# Patient Record
Sex: FEMALE | Race: WHITE | NOT HISPANIC OR LATINO | Employment: FULL TIME | URBAN - METROPOLITAN AREA
[De-identification: names, ages, dates, MRNs, and addresses within clinical notes are randomized per-mention and may not be internally consistent; named-entity substitution may affect disease eponyms.]

---

## 2017-01-10 ENCOUNTER — ALLSCRIPTS OFFICE VISIT (OUTPATIENT)
Dept: OTHER | Facility: OTHER | Age: 37
End: 2017-01-10

## 2017-02-15 ENCOUNTER — ALLSCRIPTS OFFICE VISIT (OUTPATIENT)
Dept: OTHER | Facility: OTHER | Age: 37
End: 2017-02-15

## 2017-02-20 ENCOUNTER — ALLSCRIPTS OFFICE VISIT (OUTPATIENT)
Dept: OTHER | Facility: OTHER | Age: 37
End: 2017-02-20

## 2017-02-20 ENCOUNTER — TRANSCRIBE ORDERS (OUTPATIENT)
Dept: ADMINISTRATIVE | Facility: HOSPITAL | Age: 37
End: 2017-02-20

## 2017-02-20 DIAGNOSIS — I07.1 RHEUMATIC TRICUSPID INSUFFICIENCY: ICD-10-CM

## 2017-02-20 DIAGNOSIS — R74.02 NONSPECIFIC ELEVATION OF LEVELS OF TRANSAMINASE AND LACTIC ACID DEHYDROGENASE (LDH): ICD-10-CM

## 2017-02-20 DIAGNOSIS — R01.1 UNDIAGNOSED CARDIAC MURMURS: ICD-10-CM

## 2017-02-20 DIAGNOSIS — I34.0 NONRHEUMATIC MITRAL VALVE INSUFFICIENCY: ICD-10-CM

## 2017-02-20 DIAGNOSIS — R01.1 CARDIAC MURMUR: ICD-10-CM

## 2017-02-20 DIAGNOSIS — E66.01 MORBID (SEVERE) OBESITY DUE TO EXCESS CALORIES (HCC): ICD-10-CM

## 2017-02-20 DIAGNOSIS — R74.01 NONSPECIFIC ELEVATION OF LEVELS OF TRANSAMINASE AND LACTIC ACID DEHYDROGENASE (LDH): ICD-10-CM

## 2017-02-20 DIAGNOSIS — I34.1 J.B. BARLOW'S SYNDROME: ICD-10-CM

## 2017-02-20 DIAGNOSIS — I07.1 RHEUMATIC TRICUSPID VALVE REGURGITATION: Primary | ICD-10-CM

## 2017-02-20 DIAGNOSIS — F32.9 MAJOR DEPRESSIVE DISORDER, SINGLE EPISODE: ICD-10-CM

## 2017-02-20 DIAGNOSIS — I10 ESSENTIAL (PRIMARY) HYPERTENSION: ICD-10-CM

## 2017-02-21 ENCOUNTER — LAB CONVERSION - ENCOUNTER (OUTPATIENT)
Dept: OTHER | Facility: OTHER | Age: 37
End: 2017-02-21

## 2017-02-21 ENCOUNTER — GENERIC CONVERSION - ENCOUNTER (OUTPATIENT)
Dept: OTHER | Facility: OTHER | Age: 37
End: 2017-02-21

## 2017-02-21 LAB
A/G RATIO (HISTORICAL): 1.6 (CALC) (ref 1–2.5)
ALBUMIN SERPL BCP-MCNC: 4.4 G/DL (ref 3.6–5.1)
ALP SERPL-CCNC: 71 U/L (ref 33–115)
ALT SERPL W P-5'-P-CCNC: 27 U/L (ref 6–29)
AST SERPL W P-5'-P-CCNC: 17 U/L (ref 10–30)
BILIRUB SERPL-MCNC: 0.6 MG/DL (ref 0.2–1.2)
BILIRUBIN DIRECT (HISTORICAL): 0.1 MG/DL
GAMMA GLOBULIN (HISTORICAL): 2.7 G/DL (CALC) (ref 1.9–3.7)
HEPATITIS A IGM ANTIBODY (HISTORICAL): NORMAL
HEPATITIS B CORE TOTAL ANTIBODY (HISTORICAL): NORMAL
HEPATITIS B SURFACE ANTIGEN (HISTORICAL): NORMAL
HEPATITIS C ANTIBODY (HISTORICAL): NORMAL
INDIRECT BILIRUBIN (HISTORICAL): 0.5 MG/DL (CALC) (ref 0.2–1.2)
SIGNAL TO CUT-OFF (HISTORICAL): 0.02
TOTAL PROTEIN (HISTORICAL): 7.1 G/DL (ref 6.1–8.1)

## 2017-02-28 ENCOUNTER — ANESTHESIA EVENT (OUTPATIENT)
Dept: GASTROENTEROLOGY | Facility: HOSPITAL | Age: 37
End: 2017-02-28
Payer: COMMERCIAL

## 2017-03-01 ENCOUNTER — HOSPITAL ENCOUNTER (OUTPATIENT)
Facility: HOSPITAL | Age: 37
Setting detail: OUTPATIENT SURGERY
Discharge: HOME | End: 2017-03-01
Attending: SURGERY | Admitting: SURGERY
Payer: COMMERCIAL

## 2017-03-01 ENCOUNTER — ANESTHESIA (OUTPATIENT)
Dept: GASTROENTEROLOGY | Facility: HOSPITAL | Age: 37
End: 2017-03-01
Payer: COMMERCIAL

## 2017-03-01 VITALS
BODY MASS INDEX: 43.34 KG/M2 | RESPIRATION RATE: 18 BRPM | HEART RATE: 63 BPM | DIASTOLIC BLOOD PRESSURE: 75 MMHG | OXYGEN SATURATION: 98 % | HEIGHT: 59 IN | WEIGHT: 215 LBS | SYSTOLIC BLOOD PRESSURE: 129 MMHG | TEMPERATURE: 97.9 F

## 2017-03-01 DIAGNOSIS — E66.01 MORBID (SEVERE) OBESITY DUE TO EXCESS CALORIES (HCC): ICD-10-CM

## 2017-03-01 LAB — EXT PREGNANCY TEST URINE: NEGATIVE

## 2017-03-01 PROCEDURE — 88305 TISSUE EXAM BY PATHOLOGIST: CPT | Performed by: SURGERY

## 2017-03-01 PROCEDURE — 81025 URINE PREGNANCY TEST: CPT | Performed by: ANESTHESIOLOGY

## 2017-03-01 PROCEDURE — 88342 IMHCHEM/IMCYTCHM 1ST ANTB: CPT | Performed by: SURGERY

## 2017-03-01 RX ORDER — ATENOLOL 50 MG/1
50 TABLET ORAL EVERY MORNING
Status: ON HOLD | COMMUNITY
End: 2017-06-01

## 2017-03-01 RX ORDER — SODIUM CHLORIDE 9 MG/ML
125 INJECTION, SOLUTION INTRAVENOUS CONTINUOUS
Status: DISCONTINUED | OUTPATIENT
Start: 2017-03-01 | End: 2017-03-01 | Stop reason: HOSPADM

## 2017-03-01 RX ORDER — FLUOXETINE 20 MG/1
20 TABLET, FILM COATED ORAL EVERY MORNING
COMMUNITY
End: 2018-02-19

## 2017-03-01 RX ORDER — PROPOFOL 10 MG/ML
INJECTION, EMULSION INTRAVENOUS AS NEEDED
Status: DISCONTINUED | OUTPATIENT
Start: 2017-03-01 | End: 2017-03-01 | Stop reason: SURG

## 2017-03-01 RX ORDER — ONDANSETRON 2 MG/ML
4 INJECTION INTRAMUSCULAR; INTRAVENOUS ONCE
Status: DISCONTINUED | OUTPATIENT
Start: 2017-03-01 | End: 2017-03-01 | Stop reason: HOSPADM

## 2017-03-01 RX ADMIN — PROPOFOL 160 MG: 10 INJECTION, EMULSION INTRAVENOUS at 08:48

## 2017-03-01 RX ADMIN — SODIUM CHLORIDE 125 ML/HR: 0.9 INJECTION, SOLUTION INTRAVENOUS at 07:10

## 2017-03-01 RX ADMIN — PROPOFOL 40 MG: 10 INJECTION, EMULSION INTRAVENOUS at 08:52

## 2017-03-22 ENCOUNTER — APPOINTMENT (OUTPATIENT)
Dept: LAB | Facility: HOSPITAL | Age: 37
End: 2017-03-22
Attending: SURGERY
Payer: COMMERCIAL

## 2017-03-22 ENCOUNTER — HOSPITAL ENCOUNTER (OUTPATIENT)
Dept: NON INVASIVE DIAGNOSTICS | Facility: HOSPITAL | Age: 37
Discharge: HOME | End: 2017-03-22
Payer: COMMERCIAL

## 2017-03-22 ENCOUNTER — GENERIC CONVERSION - ENCOUNTER (OUTPATIENT)
Dept: OTHER | Facility: OTHER | Age: 37
End: 2017-03-22

## 2017-03-22 ENCOUNTER — HOSPITAL ENCOUNTER (OUTPATIENT)
Dept: ULTRASOUND IMAGING | Facility: HOSPITAL | Age: 37
Discharge: HOME | End: 2017-03-22
Payer: COMMERCIAL

## 2017-03-22 DIAGNOSIS — R74.01 NONSPECIFIC ELEVATION OF LEVELS OF TRANSAMINASE AND LACTIC ACID DEHYDROGENASE (LDH): ICD-10-CM

## 2017-03-22 DIAGNOSIS — I07.1 RHEUMATIC TRICUSPID VALVE REGURGITATION: ICD-10-CM

## 2017-03-22 DIAGNOSIS — R74.02 NONSPECIFIC ELEVATION OF LEVELS OF TRANSAMINASE AND LACTIC ACID DEHYDROGENASE (LDH): ICD-10-CM

## 2017-03-22 DIAGNOSIS — F32.9 MAJOR DEPRESSIVE DISORDER, SINGLE EPISODE: ICD-10-CM

## 2017-03-22 DIAGNOSIS — I34.1 J.B. BARLOW'S SYNDROME: ICD-10-CM

## 2017-03-22 DIAGNOSIS — R01.1 UNDIAGNOSED CARDIAC MURMURS: ICD-10-CM

## 2017-03-22 DIAGNOSIS — E66.01 MORBID (SEVERE) OBESITY DUE TO EXCESS CALORIES (HCC): ICD-10-CM

## 2017-03-22 LAB
ALBUMIN SERPL BCP-MCNC: 3.9 G/DL (ref 3.5–5)
ALP SERPL-CCNC: 81 U/L (ref 46–116)
ALT SERPL W P-5'-P-CCNC: 37 U/L (ref 12–78)
ANION GAP SERPL CALCULATED.3IONS-SCNC: 8 MMOL/L (ref 4–13)
AST SERPL W P-5'-P-CCNC: 19 U/L (ref 5–45)
BILIRUB SERPL-MCNC: 0.45 MG/DL (ref 0.2–1)
BUN SERPL-MCNC: 16 MG/DL (ref 5–25)
CALCIUM SERPL-MCNC: 8.6 MG/DL (ref 8.3–10.1)
CHLORIDE SERPL-SCNC: 102 MMOL/L (ref 100–108)
CHOLEST SERPL-MCNC: 152 MG/DL (ref 50–200)
CO2 SERPL-SCNC: 28 MMOL/L (ref 21–32)
CREAT SERPL-MCNC: 0.75 MG/DL (ref 0.6–1.3)
ERYTHROCYTE [DISTWIDTH] IN BLOOD BY AUTOMATED COUNT: 12.2 % (ref 11.6–15.1)
GFR SERPL CREATININE-BSD FRML MDRD: >60 ML/MIN/1.73SQ M
GLUCOSE P FAST SERPL-MCNC: 92 MG/DL (ref 65–99)
HCT VFR BLD AUTO: 39.7 % (ref 34.8–46.1)
HDLC SERPL-MCNC: 50 MG/DL (ref 40–60)
HGB BLD-MCNC: 13.7 G/DL (ref 11.5–15.4)
LDLC SERPL CALC-MCNC: 88 MG/DL (ref 0–100)
MCH RBC QN AUTO: 30.9 PG (ref 26.8–34.3)
MCHC RBC AUTO-ENTMCNC: 34.5 G/DL (ref 31.4–37.4)
MCV RBC AUTO: 90 FL (ref 82–98)
PLATELET # BLD AUTO: 315 THOUSANDS/UL (ref 149–390)
PMV BLD AUTO: 11.1 FL (ref 8.9–12.7)
POTASSIUM SERPL-SCNC: 4.1 MMOL/L (ref 3.5–5.3)
PROT SERPL-MCNC: 7.2 G/DL (ref 6.4–8.2)
RBC # BLD AUTO: 4.43 MILLION/UL (ref 3.81–5.12)
SODIUM SERPL-SCNC: 138 MMOL/L (ref 136–145)
TRIGL SERPL-MCNC: 72 MG/DL
TSH SERPL DL<=0.05 MIU/L-ACNC: 2.87 UIU/ML (ref 0.36–3.74)
WBC # BLD AUTO: 7.56 THOUSAND/UL (ref 4.31–10.16)

## 2017-03-22 PROCEDURE — 76700 US EXAM ABDOM COMPLETE: CPT

## 2017-03-22 PROCEDURE — 84443 ASSAY THYROID STIM HORMONE: CPT

## 2017-03-22 PROCEDURE — 85027 COMPLETE CBC AUTOMATED: CPT

## 2017-03-22 PROCEDURE — 93306 TTE W/DOPPLER COMPLETE: CPT

## 2017-03-22 PROCEDURE — 36415 COLL VENOUS BLD VENIPUNCTURE: CPT

## 2017-03-22 PROCEDURE — 80053 COMPREHEN METABOLIC PANEL: CPT

## 2017-03-22 PROCEDURE — 80061 LIPID PANEL: CPT

## 2017-03-24 ENCOUNTER — GENERIC CONVERSION - ENCOUNTER (OUTPATIENT)
Dept: OTHER | Facility: OTHER | Age: 37
End: 2017-03-24

## 2017-03-28 ENCOUNTER — GENERIC CONVERSION - ENCOUNTER (OUTPATIENT)
Dept: OTHER | Facility: OTHER | Age: 37
End: 2017-03-28

## 2017-03-31 ENCOUNTER — ALLSCRIPTS OFFICE VISIT (OUTPATIENT)
Dept: OTHER | Facility: OTHER | Age: 37
End: 2017-03-31

## 2017-05-11 ENCOUNTER — ANESTHESIA EVENT (OUTPATIENT)
Dept: PERIOP | Facility: HOSPITAL | Age: 37
DRG: 620 | End: 2017-05-11
Payer: COMMERCIAL

## 2017-05-11 ENCOUNTER — ALLSCRIPTS OFFICE VISIT (OUTPATIENT)
Dept: OTHER | Facility: OTHER | Age: 37
End: 2017-05-11

## 2017-05-22 ENCOUNTER — GENERIC CONVERSION - ENCOUNTER (OUTPATIENT)
Dept: OTHER | Facility: OTHER | Age: 37
End: 2017-05-22

## 2017-05-30 ENCOUNTER — ANESTHESIA (OUTPATIENT)
Dept: PERIOP | Facility: HOSPITAL | Age: 37
DRG: 620 | End: 2017-05-30
Payer: COMMERCIAL

## 2017-05-30 ENCOUNTER — HOSPITAL ENCOUNTER (INPATIENT)
Facility: HOSPITAL | Age: 37
LOS: 2 days | Discharge: HOME | DRG: 620 | End: 2017-06-01
Attending: SURGERY | Admitting: SURGERY
Payer: COMMERCIAL

## 2017-05-30 DIAGNOSIS — I10 ESSENTIAL HYPERTENSION: Primary | ICD-10-CM

## 2017-05-30 PROBLEM — E66.01 MORBID OBESITY DUE TO EXCESS CALORIES (HCC): Status: ACTIVE | Noted: 2017-05-30

## 2017-05-30 LAB — EXT PREGNANCY TEST URINE: NEGATIVE

## 2017-05-30 PROCEDURE — 0DJ08ZZ INSPECTION OF UPPER INTESTINAL TRACT, VIA NATURAL OR ARTIFICIAL OPENING ENDOSCOPIC: ICD-10-PCS | Performed by: SURGERY

## 2017-05-30 PROCEDURE — 0D164ZA BYPASS STOMACH TO JEJUNUM, PERCUTANEOUS ENDOSCOPIC APPROACH: ICD-10-PCS | Performed by: SURGERY

## 2017-05-30 PROCEDURE — 81025 URINE PREGNANCY TEST: CPT | Performed by: ANESTHESIOLOGY

## 2017-05-30 PROCEDURE — C9113 INJ PANTOPRAZOLE SODIUM, VIA: HCPCS | Performed by: SURGERY

## 2017-05-30 PROCEDURE — A9270 NON-COVERED ITEM OR SERVICE: HCPCS | Performed by: SURGERY

## 2017-05-30 RX ORDER — OXYCODONE HCL 5 MG/5 ML
10 SOLUTION, ORAL ORAL EVERY 4 HOURS PRN
Status: DISCONTINUED | OUTPATIENT
Start: 2017-05-30 | End: 2017-05-31

## 2017-05-30 RX ORDER — ACETAMINOPHEN 160 MG/5ML
325 SUSPENSION, ORAL (FINAL DOSE FORM) ORAL EVERY 4 HOURS PRN
Status: DISCONTINUED | OUTPATIENT
Start: 2017-05-30 | End: 2017-05-31

## 2017-05-30 RX ORDER — METOCLOPRAMIDE HYDROCHLORIDE 5 MG/ML
10 INJECTION INTRAMUSCULAR; INTRAVENOUS EVERY 6 HOURS SCHEDULED
Status: DISCONTINUED | OUTPATIENT
Start: 2017-05-30 | End: 2017-06-01 | Stop reason: HOSPADM

## 2017-05-30 RX ORDER — SODIUM CHLORIDE 9 MG/ML
125 INJECTION, SOLUTION INTRAVENOUS CONTINUOUS
Status: DISCONTINUED | OUTPATIENT
Start: 2017-05-30 | End: 2017-05-30 | Stop reason: HOSPADM

## 2017-05-30 RX ORDER — BUPIVACAINE HYDROCHLORIDE AND EPINEPHRINE 5; 5 MG/ML; UG/ML
INJECTION, SOLUTION PERINEURAL AS NEEDED
Status: DISCONTINUED | OUTPATIENT
Start: 2017-05-30 | End: 2017-05-30 | Stop reason: HOSPADM

## 2017-05-30 RX ORDER — HYDROMORPHONE HYDROCHLORIDE 2 MG/ML
INJECTION, SOLUTION INTRAMUSCULAR; INTRAVENOUS; SUBCUTANEOUS AS NEEDED
Status: DISCONTINUED | OUTPATIENT
Start: 2017-05-30 | End: 2017-05-30 | Stop reason: SURG

## 2017-05-30 RX ORDER — MORPHINE SULFATE 2 MG/ML
2 INJECTION, SOLUTION INTRAMUSCULAR; INTRAVENOUS EVERY 2 HOUR PRN
Status: DISCONTINUED | OUTPATIENT
Start: 2017-05-30 | End: 2017-06-01 | Stop reason: HOSPADM

## 2017-05-30 RX ORDER — HEPARIN SODIUM 5000 [USP'U]/ML
5000 INJECTION, SOLUTION INTRAVENOUS; SUBCUTANEOUS
Status: DISCONTINUED | OUTPATIENT
Start: 2017-05-30 | End: 2017-05-30 | Stop reason: HOSPADM

## 2017-05-30 RX ORDER — MORPHINE SULFATE 4 MG/ML
4 INJECTION, SOLUTION INTRAMUSCULAR; INTRAVENOUS EVERY 2 HOUR PRN
Status: DISCONTINUED | OUTPATIENT
Start: 2017-05-30 | End: 2017-06-01 | Stop reason: HOSPADM

## 2017-05-30 RX ORDER — ROCURONIUM BROMIDE 10 MG/ML
INJECTION, SOLUTION INTRAVENOUS AS NEEDED
Status: DISCONTINUED | OUTPATIENT
Start: 2017-05-30 | End: 2017-05-30 | Stop reason: SURG

## 2017-05-30 RX ORDER — ACETAMINOPHEN 160 MG/5ML
320 SUSPENSION, ORAL (FINAL DOSE FORM) ORAL EVERY 4 HOURS PRN
Status: DISCONTINUED | OUTPATIENT
Start: 2017-05-30 | End: 2017-06-01 | Stop reason: HOSPADM

## 2017-05-30 RX ORDER — PANTOPRAZOLE SODIUM 40 MG/1
40 INJECTION, POWDER, FOR SOLUTION INTRAVENOUS
Status: DISCONTINUED | OUTPATIENT
Start: 2017-05-30 | End: 2017-06-01 | Stop reason: HOSPADM

## 2017-05-30 RX ORDER — SODIUM CHLORIDE, SODIUM LACTATE, POTASSIUM CHLORIDE, CALCIUM CHLORIDE 600; 310; 30; 20 MG/100ML; MG/100ML; MG/100ML; MG/100ML
125 INJECTION, SOLUTION INTRAVENOUS CONTINUOUS
Status: DISCONTINUED | OUTPATIENT
Start: 2017-05-30 | End: 2017-05-31

## 2017-05-30 RX ORDER — FENTANYL CITRATE 50 UG/ML
INJECTION, SOLUTION INTRAMUSCULAR; INTRAVENOUS AS NEEDED
Status: DISCONTINUED | OUTPATIENT
Start: 2017-05-30 | End: 2017-05-30 | Stop reason: SURG

## 2017-05-30 RX ORDER — PROPOFOL 10 MG/ML
INJECTION, EMULSION INTRAVENOUS AS NEEDED
Status: DISCONTINUED | OUTPATIENT
Start: 2017-05-30 | End: 2017-05-30 | Stop reason: SURG

## 2017-05-30 RX ORDER — OXYCODONE HCL 5 MG/5 ML
5 SOLUTION, ORAL ORAL EVERY 4 HOURS PRN
Status: DISCONTINUED | OUTPATIENT
Start: 2017-05-30 | End: 2017-05-31

## 2017-05-30 RX ORDER — LIDOCAINE HYDROCHLORIDE 10 MG/ML
INJECTION, SOLUTION INFILTRATION; PERINEURAL AS NEEDED
Status: DISCONTINUED | OUTPATIENT
Start: 2017-05-30 | End: 2017-05-30 | Stop reason: SURG

## 2017-05-30 RX ORDER — ONDANSETRON 2 MG/ML
INJECTION INTRAMUSCULAR; INTRAVENOUS AS NEEDED
Status: DISCONTINUED | OUTPATIENT
Start: 2017-05-30 | End: 2017-05-30 | Stop reason: SURG

## 2017-05-30 RX ORDER — MEPERIDINE HYDROCHLORIDE 50 MG/ML
12.5 INJECTION INTRAMUSCULAR; INTRAVENOUS; SUBCUTANEOUS AS NEEDED
Status: DISCONTINUED | OUTPATIENT
Start: 2017-05-30 | End: 2017-05-30 | Stop reason: HOSPADM

## 2017-05-30 RX ORDER — FENTANYL CITRATE/PF 50 MCG/ML
50 SYRINGE (ML) INJECTION
Status: DISCONTINUED | OUTPATIENT
Start: 2017-05-30 | End: 2017-05-30 | Stop reason: HOSPADM

## 2017-05-30 RX ORDER — MIDAZOLAM HYDROCHLORIDE 1 MG/ML
INJECTION INTRAMUSCULAR; INTRAVENOUS AS NEEDED
Status: DISCONTINUED | OUTPATIENT
Start: 2017-05-30 | End: 2017-05-30 | Stop reason: SURG

## 2017-05-30 RX ORDER — ONDANSETRON 2 MG/ML
4 INJECTION INTRAMUSCULAR; INTRAVENOUS EVERY 4 HOURS PRN
Status: DISCONTINUED | OUTPATIENT
Start: 2017-05-30 | End: 2017-06-01 | Stop reason: HOSPADM

## 2017-05-30 RX ORDER — GLYCOPYRROLATE 0.2 MG/ML
INJECTION INTRAMUSCULAR; INTRAVENOUS AS NEEDED
Status: DISCONTINUED | OUTPATIENT
Start: 2017-05-30 | End: 2017-05-30 | Stop reason: SURG

## 2017-05-30 RX ORDER — PROMETHAZINE HYDROCHLORIDE 25 MG/ML
25 INJECTION, SOLUTION INTRAMUSCULAR; INTRAVENOUS EVERY 6 HOURS PRN
Status: DISCONTINUED | OUTPATIENT
Start: 2017-05-30 | End: 2017-05-30 | Stop reason: RX

## 2017-05-30 RX ORDER — MAGNESIUM HYDROXIDE 1200 MG/15ML
LIQUID ORAL AS NEEDED
Status: DISCONTINUED | OUTPATIENT
Start: 2017-05-30 | End: 2017-05-30 | Stop reason: HOSPADM

## 2017-05-30 RX ADMIN — GLYCOPYRROLATE 0.4 MG: 0.2 INJECTION, SOLUTION INTRAMUSCULAR; INTRAVENOUS at 12:57

## 2017-05-30 RX ADMIN — FENTANYL CITRATE 50 MCG: 50 INJECTION, SOLUTION INTRAMUSCULAR; INTRAVENOUS at 11:54

## 2017-05-30 RX ADMIN — PANTOPRAZOLE SODIUM 40 MG: 40 INJECTION, POWDER, FOR SOLUTION INTRAVENOUS at 15:45

## 2017-05-30 RX ADMIN — DEXAMETHASONE SODIUM PHOSPHATE 8 MG: 10 INJECTION INTRAMUSCULAR; INTRAVENOUS at 11:43

## 2017-05-30 RX ADMIN — METOCLOPRAMIDE HYDROCHLORIDE 10 MG: 5 INJECTION INTRAMUSCULAR; INTRAVENOUS at 15:31

## 2017-05-30 RX ADMIN — PROPOFOL 200 MG: 10 INJECTION, EMULSION INTRAVENOUS at 11:27

## 2017-05-30 RX ADMIN — OXYCODONE HYDROCHLORIDE 10 MG: 5 SOLUTION ORAL at 19:03

## 2017-05-30 RX ADMIN — ROCURONIUM BROMIDE 30 MG: 10 INJECTION, SOLUTION INTRAVENOUS at 12:19

## 2017-05-30 RX ADMIN — CEFAZOLIN SODIUM 2000 MG: 2 SOLUTION INTRAVENOUS at 19:03

## 2017-05-30 RX ADMIN — MIDAZOLAM HYDROCHLORIDE 2 MG: 1 INJECTION, SOLUTION INTRAMUSCULAR; INTRAVENOUS at 11:20

## 2017-05-30 RX ADMIN — SODIUM CHLORIDE: 0.9 INJECTION, SOLUTION INTRAVENOUS at 13:02

## 2017-05-30 RX ADMIN — METRONIDAZOLE 500 MG: 500 INJECTION, SOLUTION INTRAVENOUS at 11:31

## 2017-05-30 RX ADMIN — HYDROMORPHONE HYDROCHLORIDE 0.4 MG: 2 INJECTION, SOLUTION INTRAMUSCULAR; INTRAVENOUS; SUBCUTANEOUS at 12:49

## 2017-05-30 RX ADMIN — FENTANYL CITRATE 50 MCG: 50 INJECTION, SOLUTION INTRAMUSCULAR; INTRAVENOUS at 12:40

## 2017-05-30 RX ADMIN — FENTANYL CITRATE 100 MCG: 50 INJECTION, SOLUTION INTRAMUSCULAR; INTRAVENOUS at 11:27

## 2017-05-30 RX ADMIN — FENTANYL CITRATE 50 MCG: 50 INJECTION, SOLUTION INTRAMUSCULAR; INTRAVENOUS at 13:38

## 2017-05-30 RX ADMIN — SODIUM CHLORIDE: 0.9 INJECTION, SOLUTION INTRAVENOUS at 12:11

## 2017-05-30 RX ADMIN — MORPHINE SULFATE 4 MG: 4 INJECTION, SOLUTION INTRAMUSCULAR; INTRAVENOUS at 15:28

## 2017-05-30 RX ADMIN — MORPHINE SULFATE 4 MG: 4 INJECTION, SOLUTION INTRAMUSCULAR; INTRAVENOUS at 17:41

## 2017-05-30 RX ADMIN — ONDANSETRON HYDROCHLORIDE 8 MG: 2 INJECTION, SOLUTION INTRAVENOUS at 11:43

## 2017-05-30 RX ADMIN — HYDROMORPHONE HYDROCHLORIDE 0.6 MG: 2 INJECTION, SOLUTION INTRAMUSCULAR; INTRAVENOUS; SUBCUTANEOUS at 12:31

## 2017-05-30 RX ADMIN — METRONIDAZOLE 500 MG: 500 SOLUTION INTRAVENOUS at 19:49

## 2017-05-30 RX ADMIN — SODIUM CHLORIDE 125 ML/HR: 0.9 INJECTION, SOLUTION INTRAVENOUS at 10:44

## 2017-05-30 RX ADMIN — ACETAMINOPHEN 325 MG: 160 SUSPENSION ORAL at 19:04

## 2017-05-30 RX ADMIN — FENTANYL CITRATE 50 MCG: 50 INJECTION, SOLUTION INTRAMUSCULAR; INTRAVENOUS at 13:28

## 2017-05-30 RX ADMIN — SODIUM CHLORIDE, SODIUM LACTATE, POTASSIUM CHLORIDE, AND CALCIUM CHLORIDE 125 ML/HR: .6; .31; .03; .02 INJECTION, SOLUTION INTRAVENOUS at 22:36

## 2017-05-30 RX ADMIN — NEOSTIGMINE METHYLSULFATE 3 MG: 1 INJECTION, SOLUTION INTRAMUSCULAR; INTRAVENOUS; SUBCUTANEOUS at 12:57

## 2017-05-30 RX ADMIN — ACETAMINOPHEN 325 MG: 160 SUSPENSION ORAL at 22:34

## 2017-05-30 RX ADMIN — FENTANYL CITRATE 50 MCG: 50 INJECTION, SOLUTION INTRAMUSCULAR; INTRAVENOUS at 13:58

## 2017-05-30 RX ADMIN — METOPROLOL TARTRATE 5 MG: 5 INJECTION INTRAVENOUS at 15:33

## 2017-05-30 RX ADMIN — SODIUM CHLORIDE, SODIUM LACTATE, POTASSIUM CHLORIDE, AND CALCIUM CHLORIDE 125 ML/HR: .6; .31; .03; .02 INJECTION, SOLUTION INTRAVENOUS at 14:10

## 2017-05-30 RX ADMIN — HEPARIN SODIUM 5000 UNITS: 5000 INJECTION, SOLUTION INTRAVENOUS; SUBCUTANEOUS at 11:07

## 2017-05-30 RX ADMIN — HYDROMORPHONE HYDROCHLORIDE 1 MG: 2 INJECTION, SOLUTION INTRAMUSCULAR; INTRAVENOUS; SUBCUTANEOUS at 11:57

## 2017-05-30 RX ADMIN — CEFAZOLIN SODIUM 2000 MG: 2 SOLUTION INTRAVENOUS at 11:31

## 2017-05-30 RX ADMIN — ROCURONIUM BROMIDE 50 MG: 10 INJECTION, SOLUTION INTRAVENOUS at 11:27

## 2017-05-30 RX ADMIN — OXYCODONE HYDROCHLORIDE 10 MG: 5 SOLUTION ORAL at 22:33

## 2017-05-30 RX ADMIN — LIDOCAINE HYDROCHLORIDE 60 MG: 10 INJECTION, SOLUTION INFILTRATION; PERINEURAL at 11:27

## 2017-05-31 LAB
ANION GAP SERPL CALCULATED.3IONS-SCNC: 6 MMOL/L (ref 4–13)
BUN SERPL-MCNC: 9 MG/DL (ref 5–25)
CALCIUM SERPL-MCNC: 7.9 MG/DL (ref 8.3–10.1)
CHLORIDE SERPL-SCNC: 107 MMOL/L (ref 100–108)
CO2 SERPL-SCNC: 26 MMOL/L (ref 21–32)
CREAT SERPL-MCNC: 0.71 MG/DL (ref 0.6–1.3)
ERYTHROCYTE [DISTWIDTH] IN BLOOD BY AUTOMATED COUNT: 12.6 % (ref 11.6–15.1)
GFR SERPL CREATININE-BSD FRML MDRD: >60 ML/MIN/1.73SQ M
GLUCOSE SERPL-MCNC: 189 MG/DL (ref 65–140)
HCT VFR BLD AUTO: 25.8 % (ref 34.8–46.1)
HCT VFR BLD AUTO: 29.2 % (ref 34.8–46.1)
HGB BLD-MCNC: 9 G/DL (ref 11.5–15.4)
HGB BLD-MCNC: 9.6 G/DL (ref 11.5–15.4)
MCH RBC QN AUTO: 29.4 PG (ref 26.8–34.3)
MCHC RBC AUTO-ENTMCNC: 32.9 G/DL (ref 31.4–37.4)
MCV RBC AUTO: 89 FL (ref 82–98)
PLATELET # BLD AUTO: 284 THOUSANDS/UL (ref 149–390)
PMV BLD AUTO: 11.5 FL (ref 8.9–12.7)
POTASSIUM SERPL-SCNC: 4.1 MMOL/L (ref 3.5–5.3)
RBC # BLD AUTO: 3.27 MILLION/UL (ref 3.81–5.12)
SODIUM SERPL-SCNC: 139 MMOL/L (ref 136–145)
WBC # BLD AUTO: 15.47 THOUSAND/UL (ref 4.31–10.16)

## 2017-05-31 PROCEDURE — 85018 HEMOGLOBIN: CPT | Performed by: PHYSICIAN ASSISTANT

## 2017-05-31 PROCEDURE — 85014 HEMATOCRIT: CPT | Performed by: NURSE PRACTITIONER

## 2017-05-31 PROCEDURE — 86900 BLOOD TYPING SEROLOGIC ABO: CPT | Performed by: NURSE PRACTITIONER

## 2017-05-31 PROCEDURE — 85018 HEMOGLOBIN: CPT | Performed by: NURSE PRACTITIONER

## 2017-05-31 PROCEDURE — A9270 NON-COVERED ITEM OR SERVICE: HCPCS | Performed by: PHYSICIAN ASSISTANT

## 2017-05-31 PROCEDURE — 86901 BLOOD TYPING SEROLOGIC RH(D): CPT | Performed by: NURSE PRACTITIONER

## 2017-05-31 PROCEDURE — 85027 COMPLETE CBC AUTOMATED: CPT | Performed by: SURGERY

## 2017-05-31 PROCEDURE — 86850 RBC ANTIBODY SCREEN: CPT | Performed by: NURSE PRACTITIONER

## 2017-05-31 PROCEDURE — 80048 BASIC METABOLIC PNL TOTAL CA: CPT | Performed by: SURGERY

## 2017-05-31 PROCEDURE — C9113 INJ PANTOPRAZOLE SODIUM, VIA: HCPCS | Performed by: SURGERY

## 2017-05-31 PROCEDURE — A9270 NON-COVERED ITEM OR SERVICE: HCPCS | Performed by: SURGERY

## 2017-05-31 PROCEDURE — 85014 HEMATOCRIT: CPT | Performed by: PHYSICIAN ASSISTANT

## 2017-05-31 RX ORDER — OXYCODONE HCL 5 MG/5 ML
5 SOLUTION, ORAL ORAL
Status: DISCONTINUED | OUTPATIENT
Start: 2017-05-31 | End: 2017-06-01 | Stop reason: HOSPADM

## 2017-05-31 RX ORDER — OXYCODONE HCL 5 MG/5 ML
10 SOLUTION, ORAL ORAL
Status: DISCONTINUED | OUTPATIENT
Start: 2017-05-31 | End: 2017-06-01 | Stop reason: HOSPADM

## 2017-05-31 RX ORDER — SODIUM CHLORIDE, SODIUM LACTATE, POTASSIUM CHLORIDE, CALCIUM CHLORIDE 600; 310; 30; 20 MG/100ML; MG/100ML; MG/100ML; MG/100ML
45 INJECTION, SOLUTION INTRAVENOUS CONTINUOUS
Status: DISCONTINUED | OUTPATIENT
Start: 2017-05-31 | End: 2017-06-01 | Stop reason: HOSPADM

## 2017-05-31 RX ORDER — ACETAMINOPHEN 160 MG/5ML
650 SUSPENSION, ORAL (FINAL DOSE FORM) ORAL EVERY 4 HOURS PRN
Status: DISCONTINUED | OUTPATIENT
Start: 2017-05-31 | End: 2017-06-01 | Stop reason: HOSPADM

## 2017-05-31 RX ADMIN — ACETAMINOPHEN 320 MG: 160 SUSPENSION ORAL at 18:43

## 2017-05-31 RX ADMIN — METRONIDAZOLE 500 MG: 500 SOLUTION INTRAVENOUS at 02:47

## 2017-05-31 RX ADMIN — METOCLOPRAMIDE HYDROCHLORIDE 10 MG: 5 INJECTION INTRAMUSCULAR; INTRAVENOUS at 05:54

## 2017-05-31 RX ADMIN — OXYCODONE HYDROCHLORIDE 10 MG: 5 SOLUTION ORAL at 10:48

## 2017-05-31 RX ADMIN — OXYCODONE HYDROCHLORIDE 10 MG: 5 SOLUTION ORAL at 14:38

## 2017-05-31 RX ADMIN — ACETAMINOPHEN 320 MG: 160 SUSPENSION ORAL at 22:35

## 2017-05-31 RX ADMIN — PANTOPRAZOLE SODIUM 40 MG: 40 INJECTION, POWDER, FOR SOLUTION INTRAVENOUS at 09:17

## 2017-05-31 RX ADMIN — OXYCODONE HYDROCHLORIDE 10 MG: 5 SOLUTION ORAL at 06:35

## 2017-05-31 RX ADMIN — METOCLOPRAMIDE HYDROCHLORIDE 10 MG: 5 INJECTION INTRAMUSCULAR; INTRAVENOUS at 00:00

## 2017-05-31 RX ADMIN — OXYCODONE HYDROCHLORIDE 10 MG: 5 SOLUTION ORAL at 02:35

## 2017-05-31 RX ADMIN — ACETAMINOPHEN 325 MG: 160 SUSPENSION ORAL at 10:49

## 2017-05-31 RX ADMIN — CEFAZOLIN SODIUM 2000 MG: 2 SOLUTION INTRAVENOUS at 03:34

## 2017-05-31 RX ADMIN — SODIUM CHLORIDE, SODIUM LACTATE, POTASSIUM CHLORIDE, AND CALCIUM CHLORIDE 45 ML/HR: .6; .31; .03; .02 INJECTION, SOLUTION INTRAVENOUS at 14:38

## 2017-05-31 RX ADMIN — OXYCODONE HYDROCHLORIDE 10 MG: 5 SOLUTION ORAL at 18:42

## 2017-05-31 RX ADMIN — OXYCODONE HYDROCHLORIDE 10 MG: 5 SOLUTION ORAL at 22:34

## 2017-05-31 RX ADMIN — METOPROLOL TARTRATE 5 MG: 5 INJECTION INTRAVENOUS at 02:37

## 2017-05-31 RX ADMIN — METOPROLOL TARTRATE 5 MG: 5 INJECTION INTRAVENOUS at 14:38

## 2017-05-31 RX ADMIN — ACETAMINOPHEN 325 MG: 160 SUSPENSION ORAL at 06:35

## 2017-05-31 RX ADMIN — ACETAMINOPHEN 320 MG: 160 SUSPENSION ORAL at 14:37

## 2017-05-31 RX ADMIN — ACETAMINOPHEN 325 MG: 160 SUSPENSION ORAL at 02:35

## 2017-05-31 RX ADMIN — METOCLOPRAMIDE HYDROCHLORIDE 10 MG: 5 INJECTION INTRAMUSCULAR; INTRAVENOUS at 12:23

## 2017-06-01 VITALS
HEART RATE: 92 BPM | BODY MASS INDEX: 39.68 KG/M2 | RESPIRATION RATE: 20 BRPM | TEMPERATURE: 97.8 F | HEIGHT: 60 IN | WEIGHT: 202.13 LBS | OXYGEN SATURATION: 98 % | DIASTOLIC BLOOD PRESSURE: 62 MMHG | SYSTOLIC BLOOD PRESSURE: 139 MMHG

## 2017-06-01 LAB
ABO GROUP BLD: NORMAL
BASOPHILS # BLD AUTO: 0.01 THOUSANDS/ΜL (ref 0–0.1)
BASOPHILS NFR BLD AUTO: 0 % (ref 0–1)
BLD GP AB SCN SERPL QL: NEGATIVE
EOSINOPHIL # BLD AUTO: 0.02 THOUSAND/ΜL (ref 0–0.61)
EOSINOPHIL NFR BLD AUTO: 0 % (ref 0–6)
ERYTHROCYTE [DISTWIDTH] IN BLOOD BY AUTOMATED COUNT: 12.8 % (ref 11.6–15.1)
HCT VFR BLD AUTO: 24.7 % (ref 34.8–46.1)
HCT VFR BLD AUTO: 24.9 % (ref 34.8–46.1)
HGB BLD-MCNC: 8.6 G/DL (ref 11.5–15.4)
HGB BLD-MCNC: 8.7 G/DL (ref 11.5–15.4)
LYMPHOCYTES # BLD AUTO: 2.72 THOUSANDS/ΜL (ref 0.6–4.47)
LYMPHOCYTES NFR BLD AUTO: 25 % (ref 14–44)
MCH RBC QN AUTO: 30.6 PG (ref 26.8–34.3)
MCHC RBC AUTO-ENTMCNC: 34.9 G/DL (ref 31.4–37.4)
MCV RBC AUTO: 88 FL (ref 82–98)
MONOCYTES # BLD AUTO: 0.65 THOUSAND/ΜL (ref 0.17–1.22)
MONOCYTES NFR BLD AUTO: 6 % (ref 4–12)
NEUTROPHILS # BLD AUTO: 7.36 THOUSANDS/ΜL (ref 1.85–7.62)
NEUTS SEG NFR BLD AUTO: 69 % (ref 43–75)
NRBC BLD AUTO-RTO: 0 /100 WBCS
PLATELET # BLD AUTO: 290 THOUSANDS/UL (ref 149–390)
PMV BLD AUTO: 11.2 FL (ref 8.9–12.7)
RBC # BLD AUTO: 2.84 MILLION/UL (ref 3.81–5.12)
RH BLD: POSITIVE
SPECIMEN EXPIRATION DATE: NORMAL
WBC # BLD AUTO: 10.76 THOUSAND/UL (ref 4.31–10.16)

## 2017-06-01 PROCEDURE — A9270 NON-COVERED ITEM OR SERVICE: HCPCS | Performed by: SURGERY

## 2017-06-01 PROCEDURE — A9270 NON-COVERED ITEM OR SERVICE: HCPCS | Performed by: INTERNAL MEDICINE

## 2017-06-01 PROCEDURE — C9113 INJ PANTOPRAZOLE SODIUM, VIA: HCPCS | Performed by: SURGERY

## 2017-06-01 PROCEDURE — A9270 NON-COVERED ITEM OR SERVICE: HCPCS | Performed by: PHYSICIAN ASSISTANT

## 2017-06-01 PROCEDURE — 85025 COMPLETE CBC W/AUTO DIFF WBC: CPT | Performed by: PHYSICIAN ASSISTANT

## 2017-06-01 RX ORDER — ATENOLOL 50 MG/1
25 TABLET ORAL EVERY MORNING
Refills: 0
Start: 2017-06-01 | End: 2017-07-17 | Stop reason: ALTCHOICE

## 2017-06-01 RX ORDER — OMEPRAZOLE 20 MG/1
20 CAPSULE, DELAYED RELEASE ORAL DAILY
Qty: 30 CAPSULE | Refills: 0
Start: 2017-06-01 | End: 2018-02-19

## 2017-06-01 RX ORDER — ATENOLOL 50 MG/1
25 TABLET ORAL DAILY
Status: DISCONTINUED | OUTPATIENT
Start: 2017-06-01 | End: 2017-06-01 | Stop reason: HOSPADM

## 2017-06-01 RX ORDER — OXYCODONE HYDROCHLORIDE AND ACETAMINOPHEN 5; 325 MG/1; MG/1
1 TABLET ORAL EVERY 4 HOURS PRN
Qty: 30 TABLET | Refills: 0
Start: 2017-06-01 | End: 2017-07-17 | Stop reason: ALTCHOICE

## 2017-06-01 RX ADMIN — OXYCODONE HYDROCHLORIDE 10 MG: 5 SOLUTION ORAL at 02:51

## 2017-06-01 RX ADMIN — METOCLOPRAMIDE HYDROCHLORIDE 10 MG: 5 INJECTION INTRAMUSCULAR; INTRAVENOUS at 06:05

## 2017-06-01 RX ADMIN — ATENOLOL 25 MG: 50 TABLET ORAL at 08:42

## 2017-06-01 RX ADMIN — OXYCODONE HYDROCHLORIDE 10 MG: 5 SOLUTION ORAL at 08:27

## 2017-06-01 RX ADMIN — METOCLOPRAMIDE HYDROCHLORIDE 10 MG: 5 INJECTION INTRAMUSCULAR; INTRAVENOUS at 00:21

## 2017-06-01 RX ADMIN — ACETAMINOPHEN 320 MG: 160 SUSPENSION ORAL at 08:26

## 2017-06-01 RX ADMIN — METOPROLOL TARTRATE 5 MG: 5 INJECTION INTRAVENOUS at 02:55

## 2017-06-01 RX ADMIN — ACETAMINOPHEN 325 MG: 160 SUSPENSION ORAL at 03:07

## 2017-06-01 RX ADMIN — PANTOPRAZOLE SODIUM 40 MG: 40 INJECTION, POWDER, FOR SOLUTION INTRAVENOUS at 08:30

## 2017-06-02 ENCOUNTER — APPOINTMENT (OUTPATIENT)
Dept: LAB | Facility: MEDICAL CENTER | Age: 37
End: 2017-06-02
Attending: SURGERY
Payer: COMMERCIAL

## 2017-06-02 ENCOUNTER — GENERIC CONVERSION - ENCOUNTER (OUTPATIENT)
Dept: OTHER | Facility: OTHER | Age: 37
End: 2017-06-02

## 2017-06-02 ENCOUNTER — TRANSCRIBE ORDERS (OUTPATIENT)
Dept: ADMINISTRATIVE | Facility: HOSPITAL | Age: 37
End: 2017-06-02

## 2017-06-02 DIAGNOSIS — I10 ESSENTIAL HYPERTENSION, MALIGNANT: Primary | ICD-10-CM

## 2017-06-02 DIAGNOSIS — I10 ESSENTIAL HYPERTENSION: ICD-10-CM

## 2017-06-02 LAB
BASOPHILS # BLD AUTO: 0.01 THOUSANDS/ΜL (ref 0–0.1)
BASOPHILS NFR BLD AUTO: 0 % (ref 0–1)
EOSINOPHIL # BLD AUTO: 0.06 THOUSAND/ΜL (ref 0–0.61)
EOSINOPHIL NFR BLD AUTO: 1 % (ref 0–6)
ERYTHROCYTE [DISTWIDTH] IN BLOOD BY AUTOMATED COUNT: 13 % (ref 11.6–15.1)
HCT VFR BLD AUTO: 24.9 % (ref 34.8–46.1)
HGB BLD-MCNC: 8.8 G/DL (ref 11.5–15.4)
LYMPHOCYTES # BLD AUTO: 1.79 THOUSANDS/ΜL (ref 0.6–4.47)
LYMPHOCYTES NFR BLD AUTO: 20 % (ref 14–44)
MCH RBC QN AUTO: 31.4 PG (ref 26.8–34.3)
MCHC RBC AUTO-ENTMCNC: 35.3 G/DL (ref 31.4–37.4)
MCV RBC AUTO: 89 FL (ref 82–98)
MONOCYTES # BLD AUTO: 0.62 THOUSAND/ΜL (ref 0.17–1.22)
MONOCYTES NFR BLD AUTO: 7 % (ref 4–12)
NEUTROPHILS # BLD AUTO: 6.63 THOUSANDS/ΜL (ref 1.85–7.62)
NEUTS SEG NFR BLD AUTO: 72 % (ref 43–75)
NRBC BLD AUTO-RTO: 0 /100 WBCS
PLATELET # BLD AUTO: 350 THOUSANDS/UL (ref 149–390)
PMV BLD AUTO: 11.6 FL (ref 8.9–12.7)
RBC # BLD AUTO: 2.8 MILLION/UL (ref 3.81–5.12)
WBC # BLD AUTO: 9.12 THOUSAND/UL (ref 4.31–10.16)

## 2017-06-02 PROCEDURE — 36415 COLL VENOUS BLD VENIPUNCTURE: CPT

## 2017-06-02 PROCEDURE — 85025 COMPLETE CBC W/AUTO DIFF WBC: CPT

## 2017-06-05 ENCOUNTER — GENERIC CONVERSION - ENCOUNTER (OUTPATIENT)
Dept: OTHER | Facility: OTHER | Age: 37
End: 2017-06-05

## 2017-06-05 ENCOUNTER — APPOINTMENT (OUTPATIENT)
Dept: LAB | Facility: MEDICAL CENTER | Age: 37
End: 2017-06-05
Payer: COMMERCIAL

## 2017-06-05 DIAGNOSIS — I10 ESSENTIAL HYPERTENSION, MALIGNANT: ICD-10-CM

## 2017-06-05 LAB
BASOPHILS # BLD AUTO: 0.02 THOUSANDS/ΜL (ref 0–0.1)
BASOPHILS NFR BLD AUTO: 0 % (ref 0–1)
EOSINOPHIL # BLD AUTO: 0.14 THOUSAND/ΜL (ref 0–0.61)
EOSINOPHIL NFR BLD AUTO: 2 % (ref 0–6)
ERYTHROCYTE [DISTWIDTH] IN BLOOD BY AUTOMATED COUNT: 13.4 % (ref 11.6–15.1)
HCT VFR BLD AUTO: 26.9 % (ref 34.8–46.1)
HGB BLD-MCNC: 9 G/DL (ref 11.5–15.4)
LYMPHOCYTES # BLD AUTO: 1.54 THOUSANDS/ΜL (ref 0.6–4.47)
LYMPHOCYTES NFR BLD AUTO: 21 % (ref 14–44)
MCH RBC QN AUTO: 29.8 PG (ref 26.8–34.3)
MCHC RBC AUTO-ENTMCNC: 33.5 G/DL (ref 31.4–37.4)
MCV RBC AUTO: 89 FL (ref 82–98)
MONOCYTES # BLD AUTO: 0.49 THOUSAND/ΜL (ref 0.17–1.22)
MONOCYTES NFR BLD AUTO: 7 % (ref 4–12)
NEUTROPHILS # BLD AUTO: 5.29 THOUSANDS/ΜL (ref 1.85–7.62)
NEUTS SEG NFR BLD AUTO: 70 % (ref 43–75)
NRBC BLD AUTO-RTO: 0 /100 WBCS
PLATELET # BLD AUTO: 491 THOUSANDS/UL (ref 149–390)
PMV BLD AUTO: 10.5 FL (ref 8.9–12.7)
RBC # BLD AUTO: 3.02 MILLION/UL (ref 3.81–5.12)
WBC # BLD AUTO: 7.49 THOUSAND/UL (ref 4.31–10.16)

## 2017-06-05 PROCEDURE — 36415 COLL VENOUS BLD VENIPUNCTURE: CPT

## 2017-06-05 PROCEDURE — 85025 COMPLETE CBC W/AUTO DIFF WBC: CPT

## 2017-06-06 ENCOUNTER — GENERIC CONVERSION - ENCOUNTER (OUTPATIENT)
Dept: OTHER | Facility: OTHER | Age: 37
End: 2017-06-06

## 2017-06-09 ENCOUNTER — ALLSCRIPTS OFFICE VISIT (OUTPATIENT)
Dept: OTHER | Facility: OTHER | Age: 37
End: 2017-06-09

## 2017-06-13 ENCOUNTER — ALLSCRIPTS OFFICE VISIT (OUTPATIENT)
Dept: OTHER | Facility: OTHER | Age: 37
End: 2017-06-13

## 2017-07-03 ENCOUNTER — GENERIC CONVERSION - ENCOUNTER (OUTPATIENT)
Dept: OTHER | Facility: OTHER | Age: 37
End: 2017-07-03

## 2017-07-11 ENCOUNTER — GENERIC CONVERSION - ENCOUNTER (OUTPATIENT)
Dept: OTHER | Facility: OTHER | Age: 37
End: 2017-07-11

## 2017-07-17 ENCOUNTER — HOSPITAL ENCOUNTER (EMERGENCY)
Facility: HOSPITAL | Age: 37
Discharge: HOME | End: 2017-07-18
Attending: EMERGENCY MEDICINE | Admitting: EMERGENCY MEDICINE
Payer: COMMERCIAL

## 2017-07-17 ENCOUNTER — APPOINTMENT (EMERGENCY)
Dept: CT IMAGING | Facility: HOSPITAL | Age: 37
End: 2017-07-17
Payer: COMMERCIAL

## 2017-07-17 ENCOUNTER — GENERIC CONVERSION - ENCOUNTER (OUTPATIENT)
Dept: OTHER | Facility: OTHER | Age: 37
End: 2017-07-17

## 2017-07-17 VITALS
TEMPERATURE: 97.6 F | WEIGHT: 176 LBS | RESPIRATION RATE: 16 BRPM | SYSTOLIC BLOOD PRESSURE: 128 MMHG | HEART RATE: 72 BPM | OXYGEN SATURATION: 99 % | DIASTOLIC BLOOD PRESSURE: 75 MMHG | BODY MASS INDEX: 34.37 KG/M2

## 2017-07-17 DIAGNOSIS — Z98.84 H/O GASTRIC BYPASS: ICD-10-CM

## 2017-07-17 DIAGNOSIS — R11.10 VOMITING: Primary | ICD-10-CM

## 2017-07-17 DIAGNOSIS — R10.13 EPIGASTRIC ABDOMINAL PAIN: ICD-10-CM

## 2017-07-17 LAB
ALBUMIN SERPL BCP-MCNC: 4 G/DL (ref 3.5–5)
ALP SERPL-CCNC: 89 U/L (ref 46–116)
ALT SERPL W P-5'-P-CCNC: 36 U/L (ref 12–78)
ANION GAP SERPL CALCULATED.3IONS-SCNC: 17 MMOL/L (ref 4–13)
AST SERPL W P-5'-P-CCNC: 22 U/L (ref 5–45)
BACTERIA UR QL AUTO: ABNORMAL /HPF
BASOPHILS # BLD AUTO: 0.02 THOUSANDS/ΜL (ref 0–0.1)
BASOPHILS NFR BLD AUTO: 0 % (ref 0–1)
BILIRUB SERPL-MCNC: 0.36 MG/DL (ref 0.2–1)
BILIRUB UR QL STRIP: ABNORMAL
BUN SERPL-MCNC: 9 MG/DL (ref 5–25)
CALCIUM SERPL-MCNC: 9.1 MG/DL (ref 8.3–10.1)
CHLORIDE SERPL-SCNC: 103 MMOL/L (ref 100–108)
CLARITY UR: CLEAR
CO2 SERPL-SCNC: 23 MMOL/L (ref 21–32)
COLOR UR: YELLOW
CREAT SERPL-MCNC: 0.62 MG/DL (ref 0.6–1.3)
EOSINOPHIL # BLD AUTO: 0.01 THOUSAND/ΜL (ref 0–0.61)
EOSINOPHIL NFR BLD AUTO: 0 % (ref 0–6)
ERYTHROCYTE [DISTWIDTH] IN BLOOD BY AUTOMATED COUNT: 13.6 % (ref 11.6–15.1)
GFR SERPL CREATININE-BSD FRML MDRD: >60 ML/MIN/1.73SQ M
GLUCOSE SERPL-MCNC: 87 MG/DL (ref 65–140)
GLUCOSE UR STRIP-MCNC: NEGATIVE MG/DL
HCG UR QL: NEGATIVE
HCT VFR BLD AUTO: 36.9 % (ref 34.8–46.1)
HGB BLD-MCNC: 12.6 G/DL (ref 11.5–15.4)
HGB UR QL STRIP.AUTO: NEGATIVE
KETONES UR STRIP-MCNC: ABNORMAL MG/DL
LEUKOCYTE ESTERASE UR QL STRIP: NEGATIVE
LIPASE SERPL-CCNC: 120 U/L (ref 73–393)
LYMPHOCYTES # BLD AUTO: 1.54 THOUSANDS/ΜL (ref 0.6–4.47)
LYMPHOCYTES NFR BLD AUTO: 21 % (ref 14–44)
MCH RBC QN AUTO: 29.9 PG (ref 26.8–34.3)
MCHC RBC AUTO-ENTMCNC: 34.1 G/DL (ref 31.4–37.4)
MCV RBC AUTO: 88 FL (ref 82–98)
MONOCYTES # BLD AUTO: 0.47 THOUSAND/ΜL (ref 0.17–1.22)
MONOCYTES NFR BLD AUTO: 7 % (ref 4–12)
NEUTROPHILS # BLD AUTO: 5.18 THOUSANDS/ΜL (ref 1.85–7.62)
NEUTS SEG NFR BLD AUTO: 72 % (ref 43–75)
NITRITE UR QL STRIP: NEGATIVE
NON-SQ EPI CELLS URNS QL MICRO: ABNORMAL /HPF
NRBC BLD AUTO-RTO: 0 /100 WBCS
PH UR STRIP.AUTO: 5.5 [PH] (ref 4.5–8)
PLATELET # BLD AUTO: 298 THOUSANDS/UL (ref 149–390)
PMV BLD AUTO: 12.5 FL (ref 8.9–12.7)
POTASSIUM SERPL-SCNC: 3.3 MMOL/L (ref 3.5–5.3)
PROT SERPL-MCNC: 7.7 G/DL (ref 6.4–8.2)
PROT UR STRIP-MCNC: ABNORMAL MG/DL
RBC # BLD AUTO: 4.21 MILLION/UL (ref 3.81–5.12)
RBC #/AREA URNS AUTO: ABNORMAL /HPF
SODIUM SERPL-SCNC: 143 MMOL/L (ref 136–145)
SP GR UR STRIP.AUTO: >=1.03 (ref 1–1.03)
UROBILINOGEN UR QL STRIP.AUTO: 1 E.U./DL
WBC # BLD AUTO: 7.22 THOUSAND/UL (ref 4.31–10.16)
WBC #/AREA URNS AUTO: ABNORMAL /HPF

## 2017-07-17 PROCEDURE — 85025 COMPLETE CBC W/AUTO DIFF WBC: CPT | Performed by: EMERGENCY MEDICINE

## 2017-07-17 PROCEDURE — 81001 URINALYSIS AUTO W/SCOPE: CPT

## 2017-07-17 PROCEDURE — 96375 TX/PRO/DX INJ NEW DRUG ADDON: CPT

## 2017-07-17 PROCEDURE — 74177 CT ABD & PELVIS W/CONTRAST: CPT

## 2017-07-17 PROCEDURE — 81002 URINALYSIS NONAUTO W/O SCOPE: CPT | Performed by: EMERGENCY MEDICINE

## 2017-07-17 PROCEDURE — 80053 COMPREHEN METABOLIC PANEL: CPT | Performed by: EMERGENCY MEDICINE

## 2017-07-17 PROCEDURE — 96361 HYDRATE IV INFUSION ADD-ON: CPT

## 2017-07-17 PROCEDURE — 96374 THER/PROPH/DIAG INJ IV PUSH: CPT

## 2017-07-17 PROCEDURE — 36415 COLL VENOUS BLD VENIPUNCTURE: CPT | Performed by: EMERGENCY MEDICINE

## 2017-07-17 PROCEDURE — 81025 URINE PREGNANCY TEST: CPT | Performed by: EMERGENCY MEDICINE

## 2017-07-17 PROCEDURE — 83690 ASSAY OF LIPASE: CPT | Performed by: EMERGENCY MEDICINE

## 2017-07-17 RX ORDER — MORPHINE SULFATE 4 MG/ML
4 INJECTION, SOLUTION INTRAMUSCULAR; INTRAVENOUS ONCE
Status: COMPLETED | OUTPATIENT
Start: 2017-07-17 | End: 2017-07-17

## 2017-07-17 RX ORDER — ONDANSETRON 2 MG/ML
4 INJECTION INTRAMUSCULAR; INTRAVENOUS ONCE
Status: COMPLETED | OUTPATIENT
Start: 2017-07-17 | End: 2017-07-17

## 2017-07-17 RX ORDER — BIOTIN 10 MG
TABLET ORAL
COMMUNITY
End: 2018-10-09

## 2017-07-17 RX ADMIN — SODIUM CHLORIDE 1000 ML: 0.9 INJECTION, SOLUTION INTRAVENOUS at 21:47

## 2017-07-17 RX ADMIN — IOHEXOL 100 ML: 350 INJECTION, SOLUTION INTRAVENOUS at 22:27

## 2017-07-17 RX ADMIN — MORPHINE SULFATE 4 MG: 4 INJECTION, SOLUTION INTRAMUSCULAR; INTRAVENOUS at 21:47

## 2017-07-17 RX ADMIN — ONDANSETRON 4 MG: 2 INJECTION INTRAMUSCULAR; INTRAVENOUS at 21:47

## 2017-07-17 RX ADMIN — IOHEXOL 50 ML: 240 INJECTION, SOLUTION INTRATHECAL; INTRAVASCULAR; INTRAVENOUS; ORAL at 22:27

## 2017-07-18 PROCEDURE — 99284 EMERGENCY DEPT VISIT MOD MDM: CPT

## 2017-08-02 ENCOUNTER — ALLSCRIPTS OFFICE VISIT (OUTPATIENT)
Dept: OTHER | Facility: OTHER | Age: 37
End: 2017-08-02

## 2017-08-02 DIAGNOSIS — N83.201 CYST OF RIGHT OVARY: ICD-10-CM

## 2017-08-21 ENCOUNTER — GENERIC CONVERSION - ENCOUNTER (OUTPATIENT)
Dept: OTHER | Facility: OTHER | Age: 37
End: 2017-08-21

## 2017-08-21 ENCOUNTER — ALLSCRIPTS OFFICE VISIT (OUTPATIENT)
Dept: OTHER | Facility: OTHER | Age: 37
End: 2017-08-21

## 2017-09-21 ENCOUNTER — ALLSCRIPTS OFFICE VISIT (OUTPATIENT)
Dept: OTHER | Facility: OTHER | Age: 37
End: 2017-09-21

## 2017-09-21 DIAGNOSIS — Z00.00 ENCOUNTER FOR GENERAL ADULT MEDICAL EXAMINATION WITHOUT ABNORMAL FINDINGS: ICD-10-CM

## 2017-09-21 DIAGNOSIS — E66.9 OBESITY: ICD-10-CM

## 2017-09-21 DIAGNOSIS — K91.2 POSTSURGICAL MALABSORPTION, NOT ELSEWHERE CLASSIFIED: ICD-10-CM

## 2017-09-21 DIAGNOSIS — Z98.84 BARIATRIC SURGERY STATUS: ICD-10-CM

## 2017-09-21 DIAGNOSIS — I10 ESSENTIAL (PRIMARY) HYPERTENSION: ICD-10-CM

## 2017-10-09 ENCOUNTER — HOSPITAL ENCOUNTER (OUTPATIENT)
Dept: ULTRASOUND IMAGING | Facility: MEDICAL CENTER | Age: 37
Discharge: HOME | End: 2017-10-09
Payer: COMMERCIAL

## 2017-10-09 DIAGNOSIS — N83.201 CYST OF RIGHT OVARY: ICD-10-CM

## 2017-10-09 PROCEDURE — 76830 TRANSVAGINAL US NON-OB: CPT

## 2017-10-09 PROCEDURE — 76856 US EXAM PELVIC COMPLETE: CPT

## 2017-10-12 ENCOUNTER — GENERIC CONVERSION - ENCOUNTER (OUTPATIENT)
Dept: OTHER | Facility: OTHER | Age: 37
End: 2017-10-12

## 2017-10-26 ENCOUNTER — ALLSCRIPTS OFFICE VISIT (OUTPATIENT)
Dept: OTHER | Facility: OTHER | Age: 37
End: 2017-10-26

## 2017-10-26 DIAGNOSIS — N83.201 CYST OF RIGHT OVARY: ICD-10-CM

## 2017-10-27 NOTE — PROGRESS NOTES
Assessment  1  Encounter for gynecological examination with abnormal finding (V72 31) (Z01 411)    Discussion/Summary  health maintenance visit Currently, she eats a healthy diet and has an adequate exercise regimen  cervical cancer screening is current Breast cancer screening: monthly self breast exam was advised  Advice and education were given regarding nutrition, aerobic exercise, weight bearing exercise, weight loss, calcium supplements and vitamin D supplements  Patient has been very compliant taking all her bariatric supplements and gets enough calcium and vitamin D with her supplements  tried as much as she can to do the elliptical however with her mother's leukemia treatments she has limited time  latest pelvic ultrasound report and findings  Patient given copy report  Plan to repeat pelvic ultrasound in the next 3 months for follow-up of the ovarian cyst  Plan to proceed with laparoscopic ovarian cystectomy as soon as patient is able to spare the time  The patient has the current Goals: Health maintenance  The patent has the current Barriers: None  Patient is able to Self-Care  Self Referrals: Yes      Chief Complaint  Pt presents for annual exam       History of Present Illness  HPI: Patient presents today for annual exam  Patient is feeling good since her gastric bypass  Patient feels occasional twinges on her right side and never knew that it was due to a cyst in her ovary  Mother was recently diagnosed with leukemia and wants to address her mother's medical problems befor proceeding with her own  GYN HM, Adult Female [facility]: The patient is being seen for a gynecology evaluation  The last health maintenance visit was 1 year(s) ago  Social History: Household members include spouse  She is   Work status: working full time,-- 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  General Health: The patient's health since the last visit is described as good  Lifestyle:   She consumes a diverse and healthy diet  She is on a bariatric diet diet and is generally adherent  Dietary details include 2 servings of dairy foods per day  -- She has weight concerns  Weight control issues: overweight  -- She exercises regularly  She exercises 2-3 times per week  Exercise includes strength training-- and-- elliptical -- She does not use tobacco -- She denies alcohol use  -- She denies drug use  Reproductive health: the patient is premenopausal--   she reports menstrual problems  Menstrual history: LMP: the last menstrual period was 9/28/17-- and-- it was of a normal amount and duration  Recent menstrual periods: bleeding has been normal  The cycles have been regular  The duration of her recent periods has been regular  -- she uses contraception  For contraception, she has had a tubal occlusion  -- she is sexually active  -- pregnancy history: G 1P 1  Screening: Cervical cancer screening includes a pap smear performed 2013-- and-- human papilloma virus screening performed 2013  Breast cancer screening includes no previous mammogram  Colorectal cancer screening includes no previous colonoscopy  She hasn't been previously screened for colorectal cancer  Review of Systems    Constitutional: No fever, no chills, feels well, no tiredness, no recent weight gain or loss  ENT: no ear ache, no loss of hearing, no nosebleeds or nasal discharge, no sore throat or hoarseness  Cardiovascular: no complaints of slow or fast heart rate, no chest pain, no palpitations, no leg claudication or lower extremity edema  Respiratory: no complaints of shortness of breath, no wheezing, no dyspnea on exertion, no orthopnea or PND  Breasts: no complaints of breast pain, breast lump or nipple discharge  Gastrointestinal: no complaints of abdominal pain, no constipation, no nausea or diarrhea, no vomiting, no bloody stools     Genitourinary: no complaints of dysuria, no incontinence, no pelvic pain, no dysmenorrhea, no vaginal discharge or abnormal vaginal bleeding-- and-- as noted in HPI  Musculoskeletal: no complaints of arthralgia, no myalgia, no joint swelling or stiffness, no limb pain or swelling  Integumentary: no complaints of skin rash or lesion, no itching or dry skin, no skin wounds  Neurological: no complaints of headache, no confusion, no numbness or tingling, no dizziness or fainting  ROS reviewed  Active Problems  1  Allergic rhinitis (477 9) (J30 9)   2  Cardiac murmur (785 2) (R01 1)   3  Depression (311) (F32 9)   4  Encounter for gynecological examination with abnormal finding (V72 31) (Z01 411)   5  Fatty infiltration of liver (571 8) (K76 0)   6  Herpes simplex infection (054 9) (B00 9)   7  Hypertension (401 9) (I10)   8  History of Morbid obesity with BMI of 40 0-44 9, adult (278 01,V85 41) (E66 01,Z68 41)   9  Need for prophylactic vaccination and inoculation against influenza (V04 81) (Z23)   10  Obesity (BMI 30-39 9) (278 00) (E66 9)   11  Postgastrectomy malabsorption (579 3) (K91 2,Z90 3)   12  Right ovarian cyst (620 2) (N83 201)   13  Routine Gynecological Exam With Cervical Pap Smear   14  Status post gastric bypass for obesity (V45 86) (Z98 84)   15  History of Transaminitis (790 4) (R74 0)    Past Medical History   · History of Acute sinusitis (461 9) (J01 90)   · History of Allergic rhinitis (477 9) (J30 9)   · History of Chronic Pneumonitis (486)   · History of Foot Pain (Soft Tissue) (729 5)   · History of Morbid obesity with BMI of 40 0-44 9, adult (278 01,V85 41) (E66 01,Z68 41)   · History of Need for prophylactic vaccination and inoculation against influenza (V04 81)  (Z23)   · History of Transaminitis (790 4) (R74 0)    The active problems and past medical history were reviewed and updated today        Surgical History   · History of  Section   · History of Diagnostic Esophagogastroduodenoscopy   · History of Gastric Surgery For Morbid Obesity Bypass With Radha-en-Y   · History of Gynecologic Services Thermal Endometrial Ablation   · History of Tubal Ligation    The surgical history was reviewed and updated today  Family History  Mother    · Family history of Anxiety (Symptom)   · Family history of Depression   · Family history of Family Health Status Of Mother - Alive   · Family history of leukemia (V16 6) (Z80 6)  Father    · Family history of Family Health Status Of Father - Alive   · Family history of Hypertension (V17 49)  Maternal Grandmother    · Family history of Coronary Artery Disease (V17 49)  Maternal Grandfather    · Family history of Stroke Syndrome (V17 1)    The family history was reviewed and updated today  Social History   · Being A Social Drinker   · Denied: History of Drug use   · Employed   · Never a smoker   · No secondhand smoke exposure (V49 89) (Z78 9)  The social history was reviewed and updated today  The social history was reviewed and is unchanged  Current Meds   1  Bariatric Fusion Oral Tablet Chewable; Therapy: (Recorded:09Jun2017) to Recorded   2  FLUoxetine HCl - 20 MG Oral Capsule; take one capsule by mouth daily; Therapy: 89RVJ2583 to (Evaluate:16Apr2018)  Requested for: 17EFU1273; Last   Rx:18Oct2017 Ordered   3  Fluticasone Propionate 50 MCG/ACT Nasal Suspension; USE 2 SPRAYS IN EACH   NOSTRIL ONCE DAILY; Therapy: 80Oso9234 to (Evaluate:93Uoa6847)  Requested for: 05Oeh4777; Last   Rx:78Quo0884 Ordered   4  Iron TABS; Therapy: (Recorded:09Jun2017) to Recorded   5  Omeprazole 20 MG Oral Capsule Delayed Release; TAKE ONE CAPSULE BY MOUTH   EVERY DAY; Therapy: 22VPC6679 to (Rosy Khan)  Requested for: 17JTL2099; Last   Rx:68Gtk9004 Ordered   6  UPCal D 500-250 POWD;   Therapy: (XZJUAFWK:26MHK7367) to Recorded    Allergies  1   Wellbutrin TABS    Vitals   Recorded: 80LFG9025 86:11LU   Systolic 458   Diastolic 80   Height 5 ft    Weight 145 lb 5 oz   BMI Calculated 28 38   BSA Calculated 1 63   LMP 35Stp0176     Physical Exam    Constitutional   General appearance: No acute distress, well appearing and well nourished  Neck   Neck: Normal, supple, trachea midline, no masses  Thyroid: Normal, no thyromegaly  Pulmonary   Respiratory effort: No increased work of breathing or signs of respiratory distress  Auscultation of lungs: Clear to auscultation  Cardiovascular   Auscultation of heart: Normal rate and rhythm, normal S1 and S2, no murmurs  Peripheral vascular exam: Normal pulses Throughout  Genitourinary   External genitalia: Normal and no lesions appreciated  Vagina: Normal, no lesions or dryness appreciated  Urethra: Normal     Urethral meatus: Normal     Bladder: Normal, soft, non-tender and no prolapse or masses appreciated  Cervix: Normal, no palpable masses  Uterus: Normal, non-tender, not enlarged, and no palpable masses  Adnexa/parametria: Abnormal  -- R adnexa mass palpated --4-5 cm, mild tenderness  Chest   Breasts: Normal and no dimpling or skin changes noted  Abdomen   Abdomen: Normal, non-tender, and no organomegaly noted  Liver and spleen: No hepatomegaly or splenomegaly  Examination for hernias: No hernias appreciated  Lymphatic   Palpation of lymph nodes in neck, axillae, groin and/or other locations: No lymphadenopathy or masses noted  Skin   Skin and subcutaneous tissue: Normal skin turgor and no rashes  Palpation of skin and subcutaneous tissue: Normal     Psychiatric   Orientation to person, place, and time: Normal     Mood and affect: Normal        Future Appointments    Date/Time Provider Specialty Site   12/14/2017 03:30 PM Mee Kirk, [address] [facility]   02/19/2018 08:30 AM Roni Alonzo DO Family Medicine [facility]     Signatures   Electronically signed by :  DAMARIS Steel ; Oct 26 2017  3:52PM EST                       (Author)

## 2017-11-30 ENCOUNTER — TRANSCRIBE ORDERS (OUTPATIENT)
Dept: ADMINISTRATIVE | Facility: HOSPITAL | Age: 37
End: 2017-11-30

## 2017-11-30 ENCOUNTER — APPOINTMENT (OUTPATIENT)
Dept: LAB | Facility: MEDICAL CENTER | Age: 37
End: 2017-11-30
Payer: COMMERCIAL

## 2017-11-30 DIAGNOSIS — Z00.00 ENCOUNTER FOR GENERAL ADULT MEDICAL EXAMINATION WITHOUT ABNORMAL FINDINGS: ICD-10-CM

## 2017-11-30 DIAGNOSIS — Z98.84 BARIATRIC SURGERY STATUS: ICD-10-CM

## 2017-11-30 DIAGNOSIS — K91.2 POSTSURGICAL MALABSORPTION, NOT ELSEWHERE CLASSIFIED: ICD-10-CM

## 2017-11-30 DIAGNOSIS — E66.9 OBESITY: ICD-10-CM

## 2017-11-30 DIAGNOSIS — I10 ESSENTIAL (PRIMARY) HYPERTENSION: ICD-10-CM

## 2017-11-30 LAB
25(OH)D3 SERPL-MCNC: 33.4 NG/ML (ref 30–100)
ALBUMIN SERPL BCP-MCNC: 3.8 G/DL (ref 3.5–5)
ALP SERPL-CCNC: 97 U/L (ref 46–116)
ALT SERPL W P-5'-P-CCNC: 22 U/L (ref 12–78)
ANION GAP SERPL CALCULATED.3IONS-SCNC: 6 MMOL/L (ref 4–13)
AST SERPL W P-5'-P-CCNC: 18 U/L (ref 5–45)
BILIRUB SERPL-MCNC: 0.41 MG/DL (ref 0.2–1)
BUN SERPL-MCNC: 8 MG/DL (ref 5–25)
CALCIUM SERPL-MCNC: 9.3 MG/DL (ref 8.3–10.1)
CHLORIDE SERPL-SCNC: 103 MMOL/L (ref 100–108)
CHOLEST SERPL-MCNC: 148 MG/DL (ref 50–200)
CO2 SERPL-SCNC: 29 MMOL/L (ref 21–32)
CREAT SERPL-MCNC: 0.52 MG/DL (ref 0.6–1.3)
ERYTHROCYTE [DISTWIDTH] IN BLOOD BY AUTOMATED COUNT: 13.5 % (ref 11.6–15.1)
FERRITIN SERPL-MCNC: 36 NG/ML (ref 8–388)
FOLATE SERPL-MCNC: >20 NG/ML (ref 3.1–17.5)
GFR SERPL CREATININE-BSD FRML MDRD: 122 ML/MIN/1.73SQ M
GLUCOSE P FAST SERPL-MCNC: 76 MG/DL (ref 65–99)
HCT VFR BLD AUTO: 39.7 % (ref 34.8–46.1)
HDLC SERPL-MCNC: 51 MG/DL (ref 40–60)
HGB BLD-MCNC: 13.3 G/DL (ref 11.5–15.4)
LDLC SERPL CALC-MCNC: 84 MG/DL (ref 0–100)
MCH RBC QN AUTO: 29.6 PG (ref 26.8–34.3)
MCHC RBC AUTO-ENTMCNC: 33.5 G/DL (ref 31.4–37.4)
MCV RBC AUTO: 88 FL (ref 82–98)
PLATELET # BLD AUTO: 329 THOUSANDS/UL (ref 149–390)
PMV BLD AUTO: 12 FL (ref 8.9–12.7)
POTASSIUM SERPL-SCNC: 4.1 MMOL/L (ref 3.5–5.3)
PROT SERPL-MCNC: 7.1 G/DL (ref 6.4–8.2)
PTH-INTACT SERPL-MCNC: 77.5 PG/ML (ref 14–72)
RBC # BLD AUTO: 4.49 MILLION/UL (ref 3.81–5.12)
SODIUM SERPL-SCNC: 138 MMOL/L (ref 136–145)
TRIGL SERPL-MCNC: 64 MG/DL
VIT B12 SERPL-MCNC: 387 PG/ML (ref 100–900)
WBC # BLD AUTO: 3.52 THOUSAND/UL (ref 4.31–10.16)

## 2017-11-30 PROCEDURE — 36415 COLL VENOUS BLD VENIPUNCTURE: CPT

## 2017-11-30 PROCEDURE — 83970 ASSAY OF PARATHORMONE: CPT

## 2017-11-30 PROCEDURE — 84425 ASSAY OF VITAMIN B-1: CPT

## 2017-11-30 PROCEDURE — 85027 COMPLETE CBC AUTOMATED: CPT

## 2017-11-30 PROCEDURE — 82746 ASSAY OF FOLIC ACID SERUM: CPT

## 2017-11-30 PROCEDURE — 82306 VITAMIN D 25 HYDROXY: CPT

## 2017-11-30 PROCEDURE — 80061 LIPID PANEL: CPT

## 2017-11-30 PROCEDURE — 82607 VITAMIN B-12: CPT

## 2017-11-30 PROCEDURE — 84590 ASSAY OF VITAMIN A: CPT

## 2017-11-30 PROCEDURE — 82728 ASSAY OF FERRITIN: CPT

## 2017-11-30 PROCEDURE — 80053 COMPREHEN METABOLIC PANEL: CPT

## 2017-12-04 LAB — VIT A SERPL-MCNC: 23 UG/DL (ref 20–65)

## 2017-12-05 LAB — VIT B1 BLD-SCNC: 103.8 NMOL/L (ref 66.5–200)

## 2017-12-08 ENCOUNTER — GENERIC CONVERSION - ENCOUNTER (OUTPATIENT)
Dept: OTHER | Facility: OTHER | Age: 37
End: 2017-12-08

## 2017-12-14 ENCOUNTER — ALLSCRIPTS OFFICE VISIT (OUTPATIENT)
Dept: OTHER | Facility: OTHER | Age: 37
End: 2017-12-14

## 2017-12-18 DIAGNOSIS — D72.819 DECREASED WHITE BLOOD CELL COUNT: ICD-10-CM

## 2018-01-05 ENCOUNTER — APPOINTMENT (OUTPATIENT)
Dept: LAB | Facility: MEDICAL CENTER | Age: 38
End: 2018-01-05
Payer: COMMERCIAL

## 2018-01-05 DIAGNOSIS — D72.819 DECREASED WHITE BLOOD CELL COUNT: ICD-10-CM

## 2018-01-05 LAB
ERYTHROCYTE [DISTWIDTH] IN BLOOD BY AUTOMATED COUNT: 13.1 % (ref 11.6–15.1)
HCT VFR BLD AUTO: 39.2 % (ref 34.8–46.1)
HGB BLD-MCNC: 13.1 G/DL (ref 11.5–15.4)
MCH RBC QN AUTO: 29.8 PG (ref 26.8–34.3)
MCHC RBC AUTO-ENTMCNC: 33.4 G/DL (ref 31.4–37.4)
MCV RBC AUTO: 89 FL (ref 82–98)
PLATELET # BLD AUTO: 287 THOUSANDS/UL (ref 149–390)
PMV BLD AUTO: 12.4 FL (ref 8.9–12.7)
RBC # BLD AUTO: 4.39 MILLION/UL (ref 3.81–5.12)
WBC # BLD AUTO: 4.33 THOUSAND/UL (ref 4.31–10.16)

## 2018-01-05 PROCEDURE — 36415 COLL VENOUS BLD VENIPUNCTURE: CPT

## 2018-01-05 PROCEDURE — 85027 COMPLETE CBC AUTOMATED: CPT

## 2018-01-05 NOTE — PROGRESS NOTES
Assessment   1  Status post gastric bypass for obesity (V45 86) (Z98 84)   2  Postgastrectomy malabsorption (579 3) (K91 2,Z90 3)   3  Secondary hyperparathyroidism (588 81) (N25 81)   4  Low vitamin B12 level (266 2) (E53 8)   5  Hypertension (401 9) (I10)   · off meds s/p gastric bypass    Plan   Low vitamin B12 level, Postgastrectomy malabsorption, Secondary hyperparathyroidism,    Status post gastric bypass for obesity    · (1) CBC/ PLT (NO DIFF); Status:Active; Requested for:14May2018; Perform:[facility]; QSW:45TOU9924;OWACNHL; For:Low vitamin B12 level, Postgastrectomy malabsorption, Secondary hyperparathyroidism, Status post gastric bypass for obesity; Ordered By:Paul Smith;   · (1) COMPREHENSIVE METABOLIC PANEL; Status:Active; Requested for:14May2018; Perform:[facility]; CRN:70FYG4382;HMINFYT; For:Low vitamin B12 level, Postgastrectomy malabsorption, Secondary hyperparathyroidism, Status post gastric bypass for obesity; Ordered By:Paul Smith;   · (1) FERRITIN; Status:Active; Requested for:14May2018; Perform:[facility]; XGR:73BEJ0850;KABAEIZ; For:Low vitamin B12 level, Postgastrectomy malabsorption, Secondary hyperparathyroidism, Status post gastric bypass for obesity; Ordered By:Paul Smith;   · (1) FOLATE; Status:Active; Requested for:14May2018; Perform:[facility]; BZM:48SYL2326;GEOEDGQ; For:Low vitamin B12 level, Postgastrectomy malabsorption, Secondary hyperparathyroidism, Status post gastric bypass for obesity; Ordered By:Paul Smith;   · (1) LIPID PANEL, FASTING; Status:Active; Requested for:14May2018; Perform:[facility]; IXL:31MAL2492;UWICJPO; For:Low vitamin B12 level, Postgastrectomy malabsorption, Secondary hyperparathyroidism, Status post gastric bypass for obesity; Ordered By:Loreto Smith;   · (1) PTH N-TERMINAL (INTACT); Status:Active; Requested for:37Qev4662;     Perform:[facility]; CUM:35LMP5818;ZWYJHJV; For:Low vitamin B12 level, Postgastrectomy malabsorption, Secondary hyperparathyroidism, Status post gastric bypass for obesity; Ordered By:Bjorn Smith;   · (1) VITAMIN A; Status:Active; Requested for:14May2018; Perform:[facility]; SZK:53NCJ0147;RFLUQVE; For:Low vitamin B12 level, Postgastrectomy malabsorption, Secondary hyperparathyroidism, Status post gastric bypass for obesity; Ordered By:Loreto Smith;   · (1) VITAMIN B1, WHOLE BLOOD; Status:Active; Requested for:14May2018; Perform:[facility]; EUQ:41YTX1067;NTSGBNT; For:Low vitamin B12 level, Postgastrectomy malabsorption, Secondary hyperparathyroidism, Status post gastric bypass for obesity; Ordered By:Bjorn Smith;   · (1) VITAMIN B12; Status:Active; Requested for:14May2018; Perform:[facility]; JVC:90FXA6218;DKBLAHE; For:Low vitamin B12 level, Postgastrectomy malabsorption, Secondary hyperparathyroidism, Status post gastric bypass for obesity; Ordered By:Bjorn Smith;   · (1) VITAMIN D 25-HYDROXY; Status:Active; Requested for:14May2018; Perform:[facility]; OWI:28NQY8437;PYUBHCF; For:Low vitamin B12 level, Postgastrectomy malabsorption, Secondary hyperparathyroidism, Status post gastric bypass for obesity; Ordered By:Bjorn Smith;    Discussion/Summary      Follow-up in 6 months  Follow diet as discussed  Get lab work done prior to your next office visit  Follow vitamin/mineral recommendations as reviewed with you  Exercise as tolerated  our office if you have any problems with abdominal pain especially if associated with fever, chills, nausea, vomiting, or any other concerns  lap Radha-en-y gastric bypass surgery is 44year old female   status post laparoscopic Radha-en-Y gastric bypass surgery by Dr Lynette Parra She has done very well so far with a 94% excess body weight loss which is above average/bmi is 26 1- Obesity has resolved   Advised that she can expect weight loss to slow or stop soon as she has lost more than average for this time frame  is here for routine follow-up  Has no complaints today tolerating a regular diet eating at least 60 grams of protein per day 30/60 minute rule with liquids at least 64 ounces of fluid per day exercising regularly   Malabsorption- pateint is at risk for malabsorption of vitamins/minerals secondary to malabsorption from her procedure and restriction of intakes current supplements and advised on same is currently taking 4 bariatric fusion and one calcium citrate per day (500 mg)   low vitamin B12- advised to add extra 500 mcg vitamin q15-sqeqaylzczf daily   hyperparathyroidism- advised to add an extra 2000 IU vitamin D3 daily-her calcium level and intakes appear adequate   labs will be rechecked with her routine labs prior to her next visit  HTN- blood pressure is controlled/she remains off her medication  The patient has the current Goals: Maintain weight loss with good nutrition intakes vitamin/mineral levels as tolerated      The patent has the current Barriers: None identified      Patient is able to Self-Care  Educational resources provided: N/a  Possible side effects of new medications were reviewed with the patient/guardian today  The treatment plan was reviewed with the patient/guardian  The patient/guardian understands and agrees with the treatment plan    She was advised to follow up due to malabsorption risks  Chief Complaint   Patient in office today for 3rd post op visit  She is exercising and taking vitamins  Post-Op   Post-Op Bariatric Surgery:      Dionicio Hayes is status post laparoscopic Radha-en-Y procedure,-- performed on 5/30/2017--   by Dr Shazia Godinez  HPI: today's weight is 133 5 lb pounds,-- today's BMI is 26 1-- and-- her total weight loss is 94% excess body weight loss pounds   The patient reports no nausea,-- no vomiting,-- no constipation,-- no diarrhea,-- no chest pain-- and-- no abdominal pain  Diet and Exercise: Diet history reviewed and discussed with the patient  Weight loss/gains to date discussed with the patient  Supplements: multivitamins-- and-- calcium  PE:      Abdominal exam: soft-- and-- no incisional hernia  Assessment:      Post-op, the patient is doing well  Plan: Activity restrictions: None  Instructions / Recommendations: recommended a daily protein intake of  grams,-- vitamin supplement(s) recommended,-- mineral supplement(s) recommended,-- recommended exercising at least 150 minutes per week,-- diet as discussed-- and-- instructed to call the office for concerns, questions, or problems  The patient was instructed to follow up in 6 months  Review of Systems        Constitutional: planned weight loss, but-- not feeling poorly  Cardiovascular: no chest pain-- and-- no palpitations  Respiratory: no shortness of breath-- and-- no wheezing  Gastrointestinal: no abdominal pain,-- no nausea,-- no vomiting,-- no constipation-- and-- no diarrhea  Psychiatric: no anxiety-- and-- no depression  Active Problems   1  Allergic rhinitis (477 9) (J30 9)   2  Cardiac murmur (785 2) (R01 1)   3  Depression (311) (F32 9)   4  Encounter for gynecological examination with abnormal finding (V72 31) (Z01 411)   5  Fatty infiltration of liver (571 8) (K76 0)   6  Herpes simplex infection (054 9) (B00 9)   7  Hypertension (401 9) (I10)   8  Low vitamin B12 level (266 2) (E53 8)   9  History of Morbid obesity with BMI of 40 0-44 9, adult (278 01,V85 41) (E66 01,Z68 41)   10  Need for prophylactic vaccination and inoculation against influenza (V04 81) (Z23)   11  Postgastrectomy malabsorption (579 3) (K91 2,Z90 3)   12  Right ovarian cyst (620 2) (N83 201)   13  Routine Gynecological Exam With Cervical Pap Smear   14  Secondary hyperparathyroidism (588 81) (N25 81)   15   Status post gastric bypass for obesity (V45 86) (Z98 84) 16  History of Transaminitis (790 4) (R74 0)    Social History    · Being A Social Drinker   · Denied: History of Drug use   · Employed   · Never a smoker   · No secondhand smoke exposure (V49 89) (Z78 9)  The social history was reviewed and updated today  Current Meds    1  Bariatric Fusion Oral Tablet Chewable; Therapy: (Recorded:2017) to Recorded   2  FLUoxetine HCl - 20 MG Oral Capsule; take one capsule by mouth daily; Therapy: 58DAP1360 to (Evaluate:2018)  Requested for: 00XDH6700; Last     Rx:2017 Ordered   3  Fluticasone Propionate 50 MCG/ACT Nasal Suspension; USE 2 SPRAYS IN EACH     NOSTRIL ONCE DAILY; Therapy: 74Alj4531 to (Evaluate:84Ywz5386)  Requested for: 52Mxp1151; Last     Rx:28Pfq2520 Ordered   4  Iron TABS; Therapy: (Recorded:2017) to Recorded   5  UPCal D 500-250 POWD;     Therapy: ([id number]) to Recorded     The medication list was reviewed and updated today  Allergies   1  Wellbutrin TABS    Vitals    Recorded: 17WFA1984 03:41PM   Temperature 98 2 F   Heart Rate 72   Respiration 18   Systolic 577   Diastolic 70   Height 5 ft    Weight 133 lb 8 0 oz   BMI Calculated 26 07   BSA Calculated 1 57     Physical Exam        Constitutional      General appearance: No acute distress, well appearing and well nourished  Eyes bilateral conjunctiva without pallor  Ears, Nose, Mouth, and Throat mucous membranes moist       Pulmonary      Respiratory effort: No increased work of breathing or signs of respiratory distress  Auscultation of lungs: Clear to auscultation  Cardiovascular      Auscultation of heart: Normal rate and rhythm, normal S1 and S2, without murmurs  Abdomen soft, no incisional hernias appreciated        Musculoskeletal      Gait and station: Normal        Psychiatric      Orientation to person, place, and time: Normal        Mood and affect: Normal           Results/Data   2017 labs reviewed with her Future Appointments      Date/Time Provider Specialty Site   11/01/2018 03:30 PM DAMARIS Tobar  Obstetrics/Gynecology [facility]   06/25/2018 09:30 AM Debra Reynolds [facility]   02/19/2018 08:30 AM Tania Alonzo DO Family Medicine [facility]     Signatures    Electronically signed by : Quiana Gallego [facility]; Dec 14 2017  5:44PM EST                       (Author)     Electronically signed by :  DAMARIS Bañuelos ; Jan 4 2018 12:35PM EST

## 2018-01-06 ENCOUNTER — GENERIC CONVERSION - ENCOUNTER (OUTPATIENT)
Dept: OTHER | Facility: OTHER | Age: 38
End: 2018-01-06

## 2018-01-09 NOTE — PROGRESS NOTES
History of Present Illness  Bariatric MNT [facility] Surgery Screening Preop [facility]:     She was on time  the appointment lasted: 60 minutes  The patient was present at the session  The diagnosis/reason for the appointment is: She has Grade III Obesity with a BMI of 43 8  Diet Order: Nutritional Assesment for Bariatric Surgery  Her goal weight is 156#-166#  She has the following comorbidities: hypertension and  , Depression  Labs: Guidry Spruce She was reminded to have her labs drawn   She does not need to monitor her glucose  She does not have a meter to test her blood glucose  Exercise Frequency:  She does not exercise  She knows the difference between being comfortably full and uncomfortably full and knows the difference between being stuffed and comfortably full  She felt/thought they felt her best at 140-150# pounds she last weighed this 6 years ago while on phentermine  She completed a food journal She drinks 8-12 cups of water daily She drinks 1-2 caffienated beverages daily Her motivators are:pt wants to improve health and quality of life  Her obesity/being overweight is related to positive energy balance and is evidenced by: BMI=43 8, pt reports little physical activity  Knowledge Deficit Prior to Education  She is new to the diet  Barriers to education:  She has no barriers to education  Medical Nutrition Therapy Intervention: Individualized Meal Plan, Daily Food /Exercise Diary, Lifestyle /Behavior Modification Techniques, Basic Pathophysiology of Obesity, Checklist of the Overview of Lesson Plans and Surgical changes to Stomach/GI  Area's Reviewed: Post - surgery goal weight, Lifestyle Laverne Trent Modification Techniques, [employer] in [place] and Pre- surgery goals /rules  Brief Review of Vitamins, diet progression for post-op and Pathophysiology of Obesity  Her comprehension of the presented material was fair  Her receptivity to the presented material was fair  Her motivation was fair  Provided: Nutrition Guidelines for Gastric Surgery, Bariatric Program Pre- Surgery Nutrition and Goals  Goals: Her set goal for physical activity is walk , for 30-60 minutes, 5-7 days a week  Review [employer] in [place]   Post Surgery: She will adhere to the diet progression: remain hydrated, consume adequate protein; and take vitamins as outlined in guidelines  Patient is a 39year old who is here for nutritional evaluation for weight loss surgery  I reviewed co-morbidities and medications with patient  She first recalls having problems with weight gain at the age of 14  She has dieted in the past with variable success but would regain the weight back  (Refer to diet history for details)  For her personal pre-op goals she will: begin eating three meals per day, practice 30/60 rule, and begin walking most days per week  She was not interested in working on a specific number of calories, but plans to food journal to track her intake  She was instructed on the importance of consistent vitamin and mineral intake after her surgery to prevent deficiencies  She currently takes a bone health supplement that contains vitamin C, vitamin D, Calcium, and Magnesium, and a [employer]-brand powdered nutritional supplement  Reviewed importance of support after surgery and discussed the [employer], pep rally and support group  Patient states adequate knowledge of nutrition, exercise and behavior modification required for long term success  Pt  is recommended for surgery and is aware to practice lifestyle modification, complete all six lesson plans in bariatric manual, and complete two-week total liquid diet to lose weight prior to surgery  Patient is aware that she has 6 months of weigh-ins required by her insurance  Recommendations: She was provided contact information for any questions  She is recommended for surgery  ~He/She needs additional education   She states adequate knowledge of nutrition, exercise, and behavior modification  She will attend a Team Meeting approximately 2-3 weeks prior to surgery  Active Problems    1  Cardiac murmur (785 2) (R01 1)   2  Depression (311) (F32 9)   3  Elevated ALT measurement (790 4) (R74 0)   4  Herpes simplex infection (054 9) (B00 9)   5  Hypertension (401 9) (I10)   6  Mitral regurgitation (424 0) (I34 0)   7  Morbid obesity with BMI of 40 0-44 9, adult (278 01,V85 41) (E66 01,Z68 41)   8  Routine Gynecological Exam With Cervical Pap Smear   9  Tricuspid regurgitation (397 0) (I07 1)    Past Medical History    1  History of Acute sinusitis (461 9) (J01 90)   2  History of Allergic rhinitis (477 9) (J30 9)   3  History of Chronic Pneumonitis (486)   4  History of Foot Pain (Soft Tissue) (729 5)   5  History of Need for prophylactic vaccination and inoculation against influenza (V04 81)   (Z23)   6  History of Need for prophylactic vaccination and inoculation against influenza (V04 81)   (Z23)    Surgical History    1  History of  Section   2  History of Gynecologic Services Thermal Endometrial Ablation   3  History of Tubal Ligation    Family History  Mother    1  Family history of Anxiety (Symptom)   2  Family history of Depression   3  Family history of Family Health Status Of Mother - Alive  Father    4  Family history of Family Health Status Of Father - Alive   5  Family history of Hypertension (V17 49)  Maternal Grandmother    6  Family history of Coronary Artery Disease (V17 49)  Maternal Grandfather    7  Family history of Stroke Syndrome (V17 1)    Social History    · Being A Social Drinker   · Denied: History of Drug use   · Employed   · Never a smoker   · No secondhand smoke exposure (V49 89) (Z78 9)    Current Meds   1  Atenolol 50 MG Oral Tablet; Take 1 tablet daily; Therapy: 35Hfn0653 to (Evaluate:28Ssd7815)  Requested for: 03Utc3706; Last   Rx:51Ikf7547 Ordered   2  FLUoxetine HCl - 20 MG Oral Capsule; Take one capsule daily;    Therapy: 42CAL5043 to (Last Rx:25Jan2016)  Requested for: 25Jan2016 Ordered   3  ValACYclovir HCl - 500 MG Oral Tablet; TAKE 1 TABLET EVERY DAY; Therapy: 99HSB5351 to (Evaluate:72Dtw7038)  Requested for: 73OOC7403; Last   Rx:77Kld2886 Ordered    Allergies    1   Wellbutrin TABS    Vitals  Signs   Recorded: 97SGH3923 02:41PM   Height: 5 ft   Weight: 224 lb 4 8 oz  BMI Calculated: 43 81  BSA Calculated: 1 96    Future Appointments    Date/Time Provider Specialty Site   02/20/2017 08:30 AM Joyce Alonzo DO Family Medicine [facility]   10/20/2016 03:30 PM Devi Bella DO Obstetrics/Gynecology [facility]     Signatures   Electronically signed by : ALEX Salcido RD; Oct 10 2016  2:50PM EST                       (Author)    Electronically signed by : DAMARIS Calderón ; Oct 12 2016  1:10AM EST                       (Validation)

## 2018-01-10 NOTE — RESULT NOTES
Verified Results  * US PELVIS COMPLETE (TRANSABDOMINAL AND TRANSVAGINAL) [id number] 10:51AM Elsy Dominguez    Order Number: [id number]    - Patient Instructions: To schedule this appointment, please contact Central Scheduling at [phone]  Test Name Result Flag Reference   US PELVIS COMPLETE (TRANSABDOMINAL AND TRANSVAGINAL) (Report)     PELVIC ULTRASOUND, COMPLETE     INDICATION: N83 201: Unspecified ovarian cyst, right side  History taken directly from the electronic ordering system  Right adnexal dermoid          COMPARISON: CT chest 7/16/2017     TECHNIQUE:  Transabdominal pelvic ultrasound was performed in sagittal and transverse planes with a curvilinear transducer  Additional transvaginal imaging was performed to better evaluate the endometrium and ovaries  Imaging included volumetric    sweeps as well as traditional still imaging technique  FINDINGS:     UTERUS:   The uterus is anteverted in position, measuring 11 x 4 x 5 9 cm  Contour and echotexture appear normal      The cervix shows no suspicious abnormality  ENDOMETRIUM:    Normal caliber of 6 mm  Homogenous and normal in appearance  OVARIES/ADNEXA:   Right ovary: 8 5 x 5 x 5 7 cm   Right adnexal mass measuring approximately 5 7 x 5 3 x 5 9 cm , corresponding with known dermoid  Doppler flow within normal limits  Left ovary: 2 x 1 6 x 1 4 cm  No suspicious left ovarian abnormality  Doppler flow within normal limits  Minimal free fluid  IMPRESSION:   Right adnexal mass measuring approximately 5 7 x 5 3 x 5 9 cm , corresponding with known dermoid  Continued follow-up recommended         Workstation performed: [id number]     Signed by:   Cale Soni MD   10/12/17

## 2018-01-10 NOTE — MISCELLANEOUS
Message   Recorded as Task   Date: 07/03/2017 11:55 AM, Created By: Ritesh Squires   Task Name: Medical Complaint Callback   Assigned To: Carmen Gardner   Regarding Patient: Crystal Tong, Status: Active   Comment:    Carmen Gardner - 03 Jul 2017 11:55 AM     TASK CREATED  pt called with up-date on her BP:  She states she had been feeling dizzy and "not right"  Took her bp and it was 95/61  She states it has never been this low  She is currently feeling better, no dizziness at present  Use cell # to contact pt  Eloy Alonzo - 03 Jul 2017 12:03 PM     TASK REPLIED TO: Previously Assigned To Eloy Alonzo  Have patient wean off atenolol 50 mg  She is currently on one half tablet daily  I want her to take one half tablet Monday Wednesday and Friday of this week then Monday Friday of next week then discontinue  Call with blood pressure in 2 weeks   Caremn Gardner - 03 Jul 2017 12:33 PM     TASK EDITED  pt notified of instructions  Active Problems    1  Cardiac murmur (785 2) (R01 1)   2  Depression (311) (F32 9)   3  Encounter for gynecological examination (V72 31) (Z01 419)   4  Fatty infiltration of liver (571 8) (K76 0)   5  Herpes simplex infection (054 9) (B00 9)   6  Hypertension (401 9) (I10)   7  Morbid obesity with BMI of 40 0-44 9, adult (278 01,V85 41) (E66 01,Z68 41)   8  Need for prophylactic vaccination and inoculation against influenza (V04 81) (Z23)   9  Routine Gynecological Exam With Cervical Pap Smear   10  Status post gastric bypass for obesity (V45 86) (Z98 84)   11  History of Transaminitis (790 4) (R74 0)    Current Meds   1  Bariatric Fusion Oral Tablet Chewable; Therapy: (Recorded:09Jun2017) to Recorded   2  FLUoxetine HCl - 20 MG Oral Capsule (PROzac); take one capsule by mouth daily; Therapy: 29TDB1185 to (96 930955)  Requested for: 24Apr2017; Last   Rx:24Apr2017 Ordered   3  Iron TABS; Therapy: (Recorded:09Jun2017) to Recorded   4   Omeprazole 20 MG Oral Capsule Delayed Release; TAKE ONE CAPSULE BY MOUTH   EVERY DAY; Therapy: 38QNL8791 to (Jade Tellez)  Requested for: 44HWD5979; Last   Rx:65Ijp2127 Ordered   5  Oxycodone-Acetaminophen 5-325 MG Oral Tablet (Percocet); TAKE 1 TABLET EVERY 4   TO 6 HOURS AS NEEDED FOR PAIN;   Therapy: 63CJF9586 to (Evaluate:89Gog3079); Last Rx:88Orp4414 Ordered   6  UPCal D 500-250 POWD;   Therapy: (KPGUSKE:25EPO5122) to Recorded   7  ValACYclovir HCl - 500 MG Oral Tablet; TAKE 1 TABLET EVERY DAY; Therapy: 40WBR7957 to (Evaluate:24Jka3073)  Requested for: 15WEP1080; Last   Rx:34Kdy3766 Ordered    Allergies    1   Wellbutrin TABS    Signatures   Electronically signed by : Jenelle Cooper, ; Jul  3 2017 12:33PM EST                       (Author)

## 2018-01-10 NOTE — PROGRESS NOTES
History of Present Illness  Bariatric Behavioral Health Evaluation [facility]:   She is here today because: Increase health, increase mobility, prevent family diseases  She is seeking a Bariatric surgery evaluation  She researched this option for: 1 year  She realizes the post-op requirements patient has friends with bariatric surgery   Her Psychiatric/Psychological diagnosis: Her outpatient counselor is currently utilizes  She does not have a Psychiatrist    She has not had Inpatient Treatment  Family Constellation: Family Constellation: Mother: hypertension, overweight, tobacco use, mental health  Father: hypertension, good health  Siblings: n/a  Spouse: diabetic , occasional cigar  Children: 1) good health  She lives withher spouse and son  Domestic Violence: has not happened  Abuse History: (denies childhood trauma)   Additional Comments: health, weight,work, relationship,  Physical/psychological assessment Appearance: appropriate   Sociability: average  Affect: appropriate  Mood: calm  Thought Process: coherent  Speech: normal  Content: no impairment  The patient was oriented to person, oriented to place, oriented to time and normal memory   normal attention span  no decreased concentration ability  normal judgment  Her emotional insight was: fair  Her intellectual insight was: fair  Risk assessment: Symptoms:  no suicidal ideation, no homicidal thoughts and no anxiety    The patient presents with complaints of mild depressed mood  No associated symptoms are reported  Sexual history: She is not pregnant  She has a history of STD's, treated  Recommendations: She is recommended for surgery  The Following Ratings are based on my: Obsevation of this individual over the last  Risk of Harm to Self or Others: The following are demographic risk factors associated with harm to self: , [place], or   (n/a)     Recent Specific Risk Factors: Symptoms:  anxiety and depressed mood, but · Being A Social Drinker   · Denied: History of Drug use   · Employed   · Never a smoker   · No secondhand smoke exposure (V49 89) (Z78 9)    Current Meds   1  Atenolol 50 MG Oral Tablet; Take 1 tablet daily; Therapy: 21Kmk2467 to (Evaluate:32Tmg8440)  Requested for: 30Tnk8672; Last   Rx:31Uzo1710 Ordered   2  FLUoxetine HCl - 20 MG Oral Capsule; Take one capsule daily; Therapy: 59CVT5487 to (Last Rx:25Jan2016)  Requested for: 32PJQ7970 Ordered   3  ValACYclovir HCl - 500 MG Oral Tablet; TAKE 1 TABLET EVERY DAY; Therapy: 52VFP6615 to (Evaluate:89Qwd6197)  Requested for: 51UNG6899; Last   Rx:12Mgn4778 Ordered    Allergies    1  Wellbutrin TABS    Vitals  Signs   Recorded: 88VET5462 01:40PM   Height: 5 ft   Weight: 224 lb 3 oz  BMI Calculated: 43 78  BSA Calculated: 1 96     Note   Note:   Patient admits to Axis I diagnosis of depression and is currently taking medication prescribed by her PCP  Patient educated regarding the impact of nicotine and alcohol on the post bariatric surgery patient  Patient meets criteria for surgery at this program and is referred to physician  Future Appointments    Date/Time Provider Specialty Site   02/20/2017 08:30 AM Dimitri Alonzo  Family Medicine [facility]   10/20/2016 03:30 PM William Valdovinos DO Obstetrics/Gynecology [facility]     Signatures   Electronically signed by : Scot Huff LCSWMSWMSJAYLIN HODGE; Oct 10 2016  2:04PM EST                       (Author)    Electronically signed by :  DAMARIS Brambila ; Oct 12 2016 12:53AM EST

## 2018-01-10 NOTE — RESULT NOTES
Verified Results  ECHO COMPLETE WITH CONTRAST IF INDICATED 2017 07:38AM Eloy Alonzo     Test Name Result Flag Reference   ECHO COMPLETE WITH CONTRAST IF INDICATED (Report)     [address]  [place], [address]   [phone]     Transthoracic Echocardiogram   2D, M-mode, Doppler, and Color Doppler     Study date: 22-Mar-2017     Patient: Quan Mason   MR number: [id number]   Account number: [de-identified]   : 1980   Age: 39 years   Gender: Female   Status: Outpatient   Location: Echo lab   Height: 60 in   Weight: 215 lb   BP: 116/ 60 mmHg     Indications: Murmur  Diagnoses: R01 1 - Cardiac murmur, unspecified     Sonographer: ROSALVA Burrell   Primary Physician: Jeet Valencia DO   Group: [facility]   Interpreting Physician: Isabel Moy MD     SUMMARY     LEFT VENTRICLE:   Systolic function was normal  Ejection fraction was estimated to be 60 %  There were no regional wall motion abnormalities  Wall thickness was at the upper limits of normal      MITRAL VALVE:   There was trace regurgitation  TRICUSPID VALVE:   There was trace regurgitation  HISTORY: PRIOR HISTORY: hypertension     PROCEDURE: The procedure was performed in the echo lab  This was a routine study  The transthoracic approach was used  The study included complete 2D imaging, M-mode, complete spectral Doppler, and color Doppler  Image quality was   adequate  LEFT VENTRICLE: Size was normal  Systolic function was normal  Ejection fraction was estimated to be 60 %  There were no regional wall motion abnormalities  Wall thickness was at the upper limits of normal  DOPPLER: The ratio of early   ventricular filling to atrial contraction velocities was within the normal range  The deceleration time of the early transmitral flow velocity was increased       RIGHT VENTRICLE: The size was normal  Systolic function was normal  Wall thickness was normal      LEFT ATRIUM: Size was normal      RIGHT ATRIUM: Size was normal      MITRAL VALVE: Valve structure was normal  There was normal leaflet separation  DOPPLER: The transmitral velocity was within the normal range  There was no evidence for stenosis  There was trace regurgitation  AORTIC VALVE: The valve was trileaflet  Leaflets exhibited normal thickness and normal cuspal separation  DOPPLER: Transaortic velocity was within the normal range  There was no evidence for stenosis  There was no regurgitation  TRICUSPID VALVE: The valve structure was normal  There was normal leaflet separation  DOPPLER: The transtricuspid velocity was within the normal range  There was no evidence for stenosis  There was trace regurgitation  Pulmonary artery   systolic pressure was at the upper limits of normal  Estimated peak PA pressure was 29 mmHg  PULMONIC VALVE: Leaflets exhibited normal thickness, no calcification, and normal cuspal separation  DOPPLER: The transpulmonic velocity was within the normal range  There was no regurgitation  PERICARDIUM: There was no pericardial effusion  The pericardium was normal in appearance  AORTA: The root exhibited normal size  SYSTEMIC VEINS: IVC: The inferior vena cava was normal in size and course   Respirophasic changes were normal      SYSTEM MEASUREMENT TABLES     2D   Ao Diam: 2 5 cm   IVSd: 1 cm   LA Diam: 3 7 cm   LVIDd: 4 6 cm   LVIDs: 2 9 cm   LVPWd: 0 9 cm     CW   TR Vmax: 2 4 m/s   TR maxP 8 mmHg     PW   E': 0 1 m/s   E/E': 9 4   MV A Raymond: 0 6 m/s   MV Dec Jenkins: 3 7 m/s2   MV DecT: 264 4 ms   MV E Raymond: 1 m/s   MV E/A Ratio: 1 6     [employer] Accredited Echocardiography Laboratory     Prepared and electronically signed by     Sana Szymanski MD   Signed 22-Mar-2017 09:17:16

## 2018-01-11 NOTE — MISCELLANEOUS
Message  I called patient this a m  to check on her to see how she is doing and to confirm to her that hg is improving  I left message requesting her to call me back to discuss  Will await return call  CR      Signatures   Electronically signed by : FUAD Izquierdo; Jun 6 2017  8:28AM EST                       (Author)

## 2018-01-11 NOTE — PROGRESS NOTES
Assessment    1  Allergic rhinitis (477 9) (J30 9)   2  Hypertension (401 9) (I10)   · off meds s/p gastric bypass   3  Right ovarian cyst (620 2) (N83 201)   4  Status post gastric bypass for obesity (V45 86) (Z98 84)   5  Depression (311) (F32 9)    Plan  Allergic rhinitis, Depression, Hypertension, Right ovarian cyst, Status post gastric bypass  for obesity    · Begin or continue regular aerobic exercise  Gradually work up to at least {count1}  sessions of {dur1} of exercise a week ; Status:Complete;   Done: 88Zcu6307 08:42AM   · Continue with our present treatment plan ; Status:Complete;   Done: 52Ipk5440  08:42AM   · We recommend that you bring your body mass index down to 26 ; Status:Complete;    Done: 97Txq7245 08:42AM   · Follow-up visit in 6 months Evaluation and Treatment  Follow-up  Status: Hold For -  Scheduling  Requested for: 11Vxb1437  PMH: Morbid obesity with BMI of 40 0-44 9, adult    · Oxycodone-Acetaminophen 5-325 MG Oral Tablet (Percocet)  Routine Gynecological Exam With Cervical Pap Smear    · *1 - [facility] (Obstetrics/ Gynecology) Co-Management  *  Status: Active   Requested for: 34Qzp8028  Care Summary provided  : Yes    Discussion/Summary    #1  Allergic rhinitis, Flonase was reordered  #2  Hypertension, well-controlled off medication with diet exercise weight loss  #3  Arrange cyst patient follows up with OB/GYN  #4  Status post gastric bypass for obesity, doing well patient follows up with bariatric specialist  #5  Depression, stable continue present therapy  #6  Patient to return in 6 months, we'll see sooner if needed  The treatment plan was reviewed with the patient/guardian  The patient/guardian understands and agrees with the treatment plan     Self Referrals: No      Chief Complaint  Follow up: Cardiac Murmur, Transaminitis, HTN, Depression, Anxiety  Requesting rx for Flonase due to allergy symptoms        History of Present Illness  Patient's having some mild nasal congestion wants a refill Flonase  Patient seen in the ER secondary to food embolus and esophagus this has resolved  CAT scan showed a small ovarian cyst patient is following up with OB/GYN for this  Has a repeat ultrasound scheduled on October patient has lost 38 pounds since her gastric bypass surgery is doing well patient's currently off blood pressure medication blood pressure is well controlled with diet exercise and weight loss      Review of Systems    Constitutional: as noted in HPI  Eyes: No complaints of eye pain, no red eyes, no eyesight problems, no discharge, no dry eyes, no itching of eyes  ENT: no complaints of earache, no loss of hearing, no nose bleeds, no nasal discharge, no sore throat, no hoarseness  Cardiovascular: No complaints of slow heart rate, no fast heart rate, no chest pain, no palpitations, no leg claudication, no lower extremity edema  Respiratory: No complaints of shortness of breath, no wheezing, no cough, no SOB on exertion, no orthopnea, no PND  Gastrointestinal: as noted in HPI  Genitourinary: as noted in HPI  Musculoskeletal: No complaints of arthralgias, no myalgias, no joint swelling or stiffness, no limb pain or swelling  Integumentary: No complaints of skin rash or lesions, no itching, no skin wounds, no breast pain or lump  Neurological: No complaints of headache, no confusion, no convulsions, no numbness, no dizziness or fainting, no tingling, no limb weakness, no difficulty walking  Psychiatric: Not suicidal, no sleep disturbance, no anxiety or depression, no change in personality, no emotional problems  Endocrine: No complaints of proptosis, no hot flashes, no muscle weakness, no deepening of the voice, no feelings of weakness  Hematologic/Lymphatic: No complaints of swollen glands, no swollen glands in the neck, does not bleed easily, does not bruise easily  Active Problems    1  Cardiac murmur (785 2) (R01 1)   2   Depression (311) (F32 9) 3  Encounter for gynecological examination (V72 31) (Z01 419)   4  Fatty infiltration of liver (571 8) (K76 0)   5  Herpes simplex infection (054 9) (B00 9)   6  Hypertension (401 9) (I10)   7  History of Morbid obesity with BMI of 40 0-44 9, adult (278 01,V85 41) (E66 01,Z68 41)   8  Need for prophylactic vaccination and inoculation against influenza (V04 81) (Z23)   9  Right ovarian cyst (620 2) (N83 201)   10  Routine Gynecological Exam With Cervical Pap Smear   11  Status post gastric bypass for obesity (V45 86) (Z98 84)   12  History of Transaminitis (790 4) (R74 0)    Past Medical History    1  History of Acute sinusitis (461 9) (J01 90)   2  History of Allergic rhinitis (477 9) (J30 9)   3  History of Chronic Pneumonitis (486)   4  History of Foot Pain (Soft Tissue) (729 5)   5  History of Morbid obesity with BMI of 40 0-44 9, adult (278 01,V85 41) (E66 01,Z68 41)   6  History of Need for prophylactic vaccination and inoculation against influenza (V04 81)   (Z23)   7  History of Postgastrectomy malabsorption (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) (K91 2,Z90 3)   8  History of Transaminitis (790 4) (R74 0)    Surgical History    1  History of  Section   2  History of Diagnostic Esophagogastroduodenoscopy   3  History of Gastric Surgery For Morbid Obesity Bypass With Radha-en-Y   4  History of Gynecologic Services Thermal Endometrial Ablation   5  History of Tubal Ligation    The surgical history was reviewed and updated today  Family History  Mother    1  Family history of Anxiety (Symptom)   2  Family history of Depression   3  Family history of Family Health Status Of Mother - Alive  Father    4  Family history of Family Health Status Of Father - Alive   5  Family history of Hypertension (V17 49)  Maternal Grandmother    6  Family history of Coronary Artery Disease (V17 49)  Maternal Grandfather    7  Family history of Stroke Syndrome (V17 1)    The family history was reviewed and updated today         Social History    · Being A Social Drinker   · Denied: History of Drug use   · Employed   · Never a smoker   · No secondhand smoke exposure (V49 89) (Z78 9)  The social history was reviewed and updated today  Current Meds   1  Bariatric Fusion Oral Tablet Chewable; Therapy: (Recorded:09Jun2017) to Recorded   2  FLUoxetine HCl - 20 MG Oral Capsule; take one capsule by mouth daily; Therapy: 13HNY8842 to [phone])  Requested for: 66Jck6897; Last   Rx:95Xlz9990 Ordered   3  Iron TABS; Therapy: (Recorded:09Jun2017) to Recorded   4  Omeprazole 20 MG Oral Capsule Delayed Release; TAKE ONE CAPSULE BY MOUTH   EVERY DAY; Therapy: 82NZG8518 to (Nicolas Mckeon)  Requested for: 91XMA3236; Last   Rx:87Ikt0469 Ordered   5  Oxycodone-Acetaminophen 5-325 MG Oral Tablet; TAKE 1 TABLET EVERY 4 TO 6   HOURS AS NEEDED FOR PAIN;   Therapy: 20FFO6741 to (Evaluate:67Bhh3954); Last Rx:31Kxx0138 Ordered   6  UPCal D 500-250 POWD;   Therapy: (JBSDQGXU:90FSA1833) to Recorded   7  ValACYclovir HCl - 500 MG Oral Tablet; TAKE 1 TABLET EVERY DAY; Therapy: 92YYX7352 to (Evaluate:28Fnx2921)  Requested for: 83JNO7247; Last   Rx:65Fiy4436 Ordered    The medication list was reviewed and updated today  Allergies    1  Wellbutrin TABS    Vitals  Vital Signs    Recorded: 21Aug2017 08:25AM   Heart Rate 68   Systolic 388, LUE, Sitting   Diastolic 80, LUE, Sitting   Height 5 ft    Weight 165 lb 3 2 oz   BMI Calculated 32 26   BSA Calculated 1 72     Physical Exam    Constitutional   General appearance: No acute distress, well appearing and well nourished  Eyes   Conjunctiva and lids: No swelling, erythema or discharge  Ears, Nose, Mouth, and Throat   External inspection of ears and nose: Normal     Otoscopic examination: Tympanic membranes translucent with normal light reflex  Canals patent without erythema  Nasal mucosa, septum, and turbinates: Abnormal   Positive allergic turbinates     Oropharynx: Normal with no erythema, edema, exudate or lesions  Pulmonary   Respiratory effort: No increased work of breathing or signs of respiratory distress  Auscultation of lungs: Clear to auscultation  Cardiovascular   Palpation of heart: Normal PMI, no thrills  Auscultation of heart: Normal rate and rhythm, normal S1 and S2, without murmurs  Carotid pulses: Normal     Abdomen Soft nontender  Lymphatic   Palpation of lymph nodes in neck: No lymphadenopathy  Musculoskeletal   Gait and station: Normal     Skin Warm and dry  Neurologic Negative gross focal motor deficit  Psychiatric   Mood and affect: Normal          Results/Data  PHQ-9 Adult Depression Screening 67Rgv5388 08:31AM User, "[employer]     Test Name Result Flag Reference   PHQ-9 Adult Depression Score 0     Over the last two weeks, how often have you been bothered by any of the following problems? Little interest or pleasure in doing things: Not at all - 0  Feeling down, depressed, or hopeless: Not at all - 0  Trouble falling or staying asleep, or sleeping too much: Not at all - 0  Feeling tired or having little energy: Not at all - 0  Poor appetite or over eating: Not at all - 0  Feeling bad about yourself - or that you are a failure or have let yourself or your family down: Not at all - 0  Trouble concentrating on things, such as reading the newspaper or watching television: Not at all - 0  Moving or speaking so slowly that other people could have noticed   Or the opposite -  being so fidgety or restless that you have been moving around a lot more than usual: Not at all - 0  Thoughts that you would be better off dead, or of hurting yourself in some way: Not at all - 0   PHQ-9 Adult Depression Screening Negative     PHQ-9 Difficulty Level Not difficult at all     PHQ-9 Severity No Depression       PHQ-2 Adult Depression Screening 00Noo5145 08:30AM User, "[employer]     Test Name Result Flag Reference   PHQ-2 Adult Depression Score 0     Over the last two weeks, how often have you been bothered by any of the following problems? Little interest or pleasure in doing things: Not at all - 0  Feeling down, depressed, or hopeless: Not at all - 0   PHQ-2 Adult Depression Screening Negative         Future Appointments    Date/Time Provider Specialty Site   10/26/2017 03:30 PM DAMARIS Coronel   Obstetrics/Gynecology [facility]   09/21/2017 03:30 PM Parminder Smith, [facility] General Surgery [facility]     Signatures   Electronically signed by : Lilliana Thomas DO; Aug 21 2017  8:43AM EST                       (Author)

## 2018-01-11 NOTE — PROGRESS NOTES
Assessment    1  Hypertension (401 9) (I10)   2  Cardiac murmur (785 2) (R01 1)   · Echo 6/26/12  Trace TR/MR   3  Mitral regurgitation (424 0) (I34 0)   · Trace on echo 6/12   4  Tricuspid regurgitation (397 0) (I07 1)   · Trace on echo 6/12   5  Depression (311) (F32 9)   6  Obesity (278 00) (E66 9)    Plan  Routine Gynecological Exam With Cervical Pap Smear    · 1 - Robyn Lawson  (Obstetrics/Gynecology) Physician Referral  Consult  Status: Hold  For - Scheduling,Retrospective Authorization  Requested for: 43KVT6784  Care Summary provided  : Yes    Discussion/Summary    #1  Hypertension, stable continue present therapy  #2  Cardiac murmur #3  Much regurgitation #4  Tricuspid regurgitation, patient complete echocardiogram is ordered, patient is asymptomatic  #5  Depression, Prozac 20 mg one daily was prescribed  Risk versus benefit especially increased risk of suicidal ideation discussed  #6  Obesity, diet exercise weight loss recommended  #7  Return to office in 2 months, sooner if needed  Possible side effects of new medications were reviewed with the patient/guardian today  The treatment plan was reviewed with the patient/guardian  The patient/guardian understands and agrees with the treatment plan      Chief Complaint  pt here for f/u to anxiety,depression  History of Present Illness  Patient has seen Hiren Frye the counseling recommended psychiatry  Patient did try 3 different psychiatric office as  One was not taking new patients  One was not scheduled for April  One scheduled today however with this no patient had to cancel and when patient called she was told she "would be billed" for the office visit the patient declines to go there discussed medications  Patient does see psychology  Patient wishes to try "Prozac" she's not tried the past  He should still feels down but no SI/HI   Patient did not complete echocardiogram as ordered      Review of Systems    Constitutional: Patient gained 5 pounds since last office visit  Eyes: No complaints of eye pain, no red eyes, no eyesight problems, no discharge, no dry eyes, no itching of eyes  ENT: no complaints of earache, no loss of hearing, no nose bleeds, no nasal discharge, no sore throat, no hoarseness  Cardiovascular: as noted in HPI  Respiratory: No complaints of shortness of breath, no wheezing, no cough, no SOB on exertion, no orthopnea, no PND  Gastrointestinal: No complaints of abdominal pain, no constipation, no nausea or vomiting, no diarrhea, no bloody stools  Genitourinary: No complaints of dysuria, no incontinence, no pelvic pain, no dysmenorrhea, no vaginal discharge or bleeding  Musculoskeletal: No complaints of arthralgias, no myalgias, no joint swelling or stiffness, no limb pain or swelling  Integumentary: No complaints of skin rash or lesions, no itching, no skin wounds, no breast pain or lump  Neurological: No complaints of headache, no confusion, no convulsions, no numbness, no dizziness or fainting, no tingling, no limb weakness, no difficulty walking  Psychiatric: as noted in HPI  Endocrine: No complaints of proptosis, no hot flashes, no muscle weakness, no deepening of the voice, no feelings of weakness  Hematologic/Lymphatic: No complaints of swollen glands, no swollen glands in the neck, does not bleed easily, does not bruise easily  Active Problems    1  Cardiac murmur (785 2) (R01 1)   2  Depression (311) (F32 9)   3  Herpes simplex infection (054 9) (B00 9)   4  Hypertension (401 9) (I10)   5  Mitral regurgitation (424 0) (I34 0)   6  Obesity (278 00) (E66 9)   7  Routine Gynecological Exam With Cervical Pap Smear   8  Tricuspid regurgitation (397 0) (I07 1)    Past Medical History    1  History of Acute sinusitis (461 9) (J01 90)   2  History of Allergic rhinitis (477 9) (J30 9)   3  History of Chronic Pneumonitis (486)   4  History of Foot Pain (Soft Tissue) (729 5)   5   History of Need for prophylactic vaccination and inoculation against influenza (V04 81)   (Z23)   6  History of Need for prophylactic vaccination and inoculation against influenza (V04 81)   (Z23)    Surgical History    1  History of  Section   2  History of Gynecologic Services Thermal Endometrial Ablation   3  History of Tubal Ligation    Family History    1  Family history of Anxiety (Symptom)   2  Family history of Depression   3  Family history of Family Health Status Of Mother - Alive    4  Family history of Family Health Status Of Father - Alive   5  Family history of Hypertension (V17 49)    6  Family history of Coronary Artery Disease (V17 49)    7  Family history of Stroke Syndrome (V17 1)    The family history was reviewed and updated today  Social History    · Being A Social Drinker   · Denied: History of Drug use   · Never A Smoker  The social history was reviewed and updated today  Current Meds   1  Atenolol 50 MG Oral Tablet; Take 1 tablet daily; Therapy: 50Mdc3594 to (Last Wesley Sartorius)  Requested for: 31Ivh7401 Ordered   2  ValACYclovir HCl - 500 MG Oral Tablet; TAKE 1 TABLET EVERY DAY; Therapy: 82MIB7672 to (Evaluate:99Bru5970)  Requested for: 50ZTQ8643; Last   Rx:12Zer4597 Ordered    Vitals  Vital Signs [Data Includes: Current Encounter]    Recorded: 25WXO5637 08:12AM   Heart Rate 64   Systolic 010   Diastolic 88   Weight 648 lb 6 08 oz     Physical Exam    Constitutional   General appearance: No acute distress, well appearing and well nourished  Eyes   Conjunctiva and lids: No swelling, erythema or discharge  Ears, Nose, Mouth, and Throat Mucous membranes are moist    Pulmonary   Respiratory effort: No increased work of breathing or signs of respiratory distress  Auscultation of lungs: Clear to auscultation  Cardiovascular   Palpation of heart: Normal PMI, no thrills  Auscultation of heart: Abnormal   Soft systolic murmur left sternal border     Lymphatic   Palpation of lymph nodes in neck: No lymphadenopathy  Musculoskeletal   Gait and station: Normal     Skin Warm and dry  Neurologic Negative gross focal deficit     Psychiatric   Mood and affect: Normal          Future Appointments    Date/Time Provider Specialty Site   06/13/2016 02:00 PM Aidan Alonzo DO [facility] Medicine [facility]     Signatures   Electronically signed by : Rubio Hunt DO; Jan 25 2016  8:27AM EST                       (Author)

## 2018-01-12 VITALS
SYSTOLIC BLOOD PRESSURE: 116 MMHG | HEART RATE: 60 BPM | WEIGHT: 212.6 LBS | BODY MASS INDEX: 41.74 KG/M2 | HEIGHT: 60 IN | DIASTOLIC BLOOD PRESSURE: 60 MMHG

## 2018-01-12 VITALS — WEIGHT: 179.2 LBS | BODY MASS INDEX: 35.18 KG/M2 | HEIGHT: 60 IN

## 2018-01-12 NOTE — MISCELLANEOUS
Message  Called patient to review most recent CBC-Friday was 8 8-improved from 8 6  She notes she still has had some small bloody stools-which Dr Anthony Parker advised her she would  She had repeat CBC this a m  I called the lab and got the result verbally which was 9 0/26 9  When I first called patient she expressed upset with her hospital stay  When I tried to discuss further she "needed to get off the phone to cook dinner"  I had advised her to take a high potency iron 65 mg daily-she notes she is taking an extra 18 mg iron daily now-so with improving CBC this may be adequate  She did not  the phone when I called her back to give her most recent results  I let her know if she has any further concerns she can call us back  I will try to touch base with her tomorrow as well  CR      Signatures   Electronically signed by : FUAD Hoffman; Jun 5 2017  5:34PM EST                       (Author)

## 2018-01-12 NOTE — MISCELLANEOUS
Message  6/2/2017 @ 1030- post op follow up phone call completed  Pt is sipping liquids, using IS as instructed, reinforced importance of using IS to help prevent pneumonia  Ambulating about home without difficulty  Pain controlled with analgesia  Reaffirmed examples of liquid diet over the next week  Pt stated understanding about discharge instructions and medication adjustments  Pt educated on [id number] [facility]  Follow up appt with surgeon scheduled for next week  Instructed to call with any additional questions or concerns  Active Problems    1  Blood tests prior to treatment or procedure (V72 63) (Z01 812)   2  Cardiac murmur (785 2) (R01 1)   3  Depression (311) (F32 9)   4  Encounter for gynecological examination (V72 31) (Z01 419)   5  Fatty infiltration of liver (571 8) (K76 0)   6  Herpes simplex infection (054 9) (B00 9)   7  Hypertension (401 9) (I10)   8  Mitral regurgitation (424 0) (I34 0)   9  Morbid obesity with BMI of 40 0-44 9, adult (278 01,V85 41) (E66 01,Z68 41)   10  Need for prophylactic vaccination and inoculation against influenza (V04 81) (Z23)   11  Pre-operative exam (V72 84) (Z01 818)   12  Routine Gynecological Exam With Cervical Pap Smear   13  History of Transaminitis (790 4) (R74 0)   14  Tricuspid regurgitation (397 0) (I07 1)    Current Meds   1  Atenolol 50 MG Oral Tablet; Take 1 tablet daily; Therapy: 16Veq1813 to (Evaluate:99Klu9944)  Requested for: 95Obu0236; Last   Rx:15Uvc8599 Ordered   2  FLUoxetine HCl - 20 MG Oral Capsule (PROzac); take one capsule by mouth daily; Therapy: 61PFL3440 to (96 966737)  Requested for: 49Qow5759; Last   Rx:74Vzs9149 Ordered   3  Omeprazole 20 MG Oral Capsule Delayed Release; TAKE ONE CAPSULE BY MOUTH   EVERY DAY; Therapy: 53OBK0301 to (Fernanda Lyle)  Requested for: 26GFM6937; Last   Rx:93Gcr9387 Ordered   4   Oxycodone-Acetaminophen 5-325 MG Oral Tablet (Percocet); TAKE 1 TABLET EVERY 4   TO 6 HOURS AS NEEDED FOR PAIN;   Therapy: 11HFK2203 to (Evaluate:57Qov4357); Last Rx:57Uix3144 Ordered   5  ValACYclovir HCl - 500 MG Oral Tablet; TAKE 1 TABLET EVERY DAY; Therapy: 78JCV2519 to (Evaluate:72Yhb7126)  Requested for: 65ELO9060; Last   Rx:25Aab0305 Ordered    Allergies    1   Wellbutrin TABS    Signatures   Electronically signed by : Catrina Lin, ; Jun 2 2017 11:02AM EST                       (Author)

## 2018-01-12 NOTE — PSYCH
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 (Suzhou Natong)"  HPI - Psych: Patient details hx of mood issues dating back to teen years  Family hx of mood issues as well  Continues to experience some issues with anger and irritability  No SI or HI   Note   Note:   Given her issues with meds and need to better ,manage mood issues, she agrees to referral to see Physician via [address] (Hitesh Bravo)  Will make that referral  Tori Baca to contact me to schedule session if needed in future  Assessment    1   Depression (311) (F32 9)    Signatures   Electronically signed by : Javier Seip, LCSW; Jan 11 2016  5:45PM EST                       (Author)

## 2018-01-13 VITALS
BODY MASS INDEX: 41.03 KG/M2 | HEART RATE: 70 BPM | DIASTOLIC BLOOD PRESSURE: 84 MMHG | RESPIRATION RATE: 16 BRPM | WEIGHT: 209 LBS | TEMPERATURE: 97 F | SYSTOLIC BLOOD PRESSURE: 128 MMHG | HEIGHT: 60 IN

## 2018-01-13 VITALS
TEMPERATURE: 97.9 F | DIASTOLIC BLOOD PRESSURE: 70 MMHG | HEART RATE: 60 BPM | HEIGHT: 60 IN | WEIGHT: 216.5 LBS | SYSTOLIC BLOOD PRESSURE: 130 MMHG | BODY MASS INDEX: 42.5 KG/M2

## 2018-01-13 VITALS
WEIGHT: 171.13 LBS | DIASTOLIC BLOOD PRESSURE: 76 MMHG | BODY MASS INDEX: 33.42 KG/M2 | SYSTOLIC BLOOD PRESSURE: 116 MMHG

## 2018-01-13 VITALS
SYSTOLIC BLOOD PRESSURE: 122 MMHG | TEMPERATURE: 98 F | WEIGHT: 154.5 LBS | RESPIRATION RATE: 22 BRPM | HEIGHT: 60 IN | HEART RATE: 70 BPM | BODY MASS INDEX: 30.33 KG/M2 | DIASTOLIC BLOOD PRESSURE: 80 MMHG

## 2018-01-13 VITALS
SYSTOLIC BLOOD PRESSURE: 124 MMHG | TEMPERATURE: 97.9 F | RESPIRATION RATE: 16 BRPM | BODY MASS INDEX: 42.13 KG/M2 | HEART RATE: 80 BPM | DIASTOLIC BLOOD PRESSURE: 80 MMHG | WEIGHT: 214.6 LBS | HEIGHT: 60 IN

## 2018-01-14 VITALS
SYSTOLIC BLOOD PRESSURE: 120 MMHG | BODY MASS INDEX: 32.43 KG/M2 | HEART RATE: 68 BPM | HEIGHT: 60 IN | WEIGHT: 165.2 LBS | DIASTOLIC BLOOD PRESSURE: 80 MMHG

## 2018-01-14 VITALS
BODY MASS INDEX: 28.53 KG/M2 | DIASTOLIC BLOOD PRESSURE: 80 MMHG | HEIGHT: 60 IN | WEIGHT: 145.31 LBS | SYSTOLIC BLOOD PRESSURE: 112 MMHG

## 2018-01-14 VITALS
SYSTOLIC BLOOD PRESSURE: 118 MMHG | BODY MASS INDEX: 38.18 KG/M2 | RESPIRATION RATE: 20 BRPM | TEMPERATURE: 97.2 F | WEIGHT: 194.5 LBS | DIASTOLIC BLOOD PRESSURE: 64 MMHG | HEART RATE: 68 BPM | HEIGHT: 60 IN

## 2018-01-14 VITALS
HEART RATE: 69 BPM | BODY MASS INDEX: 43.05 KG/M2 | WEIGHT: 213.13 LBS | DIASTOLIC BLOOD PRESSURE: 78 MMHG | SYSTOLIC BLOOD PRESSURE: 115 MMHG | RESPIRATION RATE: 15 BRPM

## 2018-01-14 NOTE — MISCELLANEOUS
Message  Return to work or school:   Brittani Leslie is under my professional care   She was seen in my office on 6/9/2017   She is able to return to work on  6/22/2017            Signatures   Electronically signed by : Dwayne Elkins, ; Aug 21 2017  8:55AM EST                       (Author)

## 2018-01-15 NOTE — PROGRESS NOTES
Message  Outreach phone call; no response but left message  How is patient coming along with protein drink? any ? or concerns  If so please call our office  Contact information provided  [place]      Active Problems    1  Blood tests prior to treatment or procedure (V72 63) (Z01 812)   2  Cardiac murmur (785 2) (R01 1)   3  Depression (311) (F32 9)   4  Encounter for gynecological examination (V72 31) (Z01 419)   5  Fatty infiltration of liver (571 8) (K76 0)   6  Herpes simplex infection (054 9) (B00 9)   7  Hypertension (401 9) (I10)   8  Mitral regurgitation (424 0) (I34 0)   9  Morbid obesity with BMI of 40 0-44 9, adult (278 01,V85 41) (E66 01,Z68 41)   10  Need for prophylactic vaccination and inoculation against influenza (V04 81) (Z23)   11  Pre-operative exam (V72 84) (Z01 818)   12  Routine Gynecological Exam With Cervical Pap Smear   13  History of Transaminitis (790 4) (R74 0)   14  Tricuspid regurgitation (397 0) (I07 1)    Current Meds   1  Atenolol 50 MG Oral Tablet; Take 1 tablet daily; Therapy: 71Pmq4970 to (Evaluate:41Otk0362)  Requested for: 18Mja8171; Last   Rx:70Asp0708 Ordered   2  FLUoxetine HCl - 20 MG Oral Capsule (PROzac); take one capsule by mouth daily; Therapy: 74SZQ7754 to (03 17 74 30 53)  Requested for: 46Gnv3076; Last   Rx:38Lcw9361 Ordered   3  Omeprazole 20 MG Oral Capsule Delayed Release; TAKE ONE CAPSULE BY MOUTH   EVERY DAY; Therapy: 60ZAD9467 to ([place] Never)  Requested for: 08BGM9882; Last   Rx:55Qqq4252; Status: ACTIVE - Retrospective By Protocol Authorization Ordered   4  Oxycodone-Acetaminophen 5-325 MG Oral Tablet (Percocet); TAKE 1 TABLET EVERY 4   TO 6 HOURS AS NEEDED FOR PAIN;   Therapy: 99FCR3690 to (Evaluate:55Are7567); Last Rx:58Jvz4532; Status: ACTIVE -   Retrospective By Protocol Authorization Ordered   5  ValACYclovir HCl - 500 MG Oral Tablet; TAKE 1 TABLET EVERY DAY; Therapy: 59XWI4722 to (Evaluate:37Fif5480)  Requested for: 05UNX5613;  Last Rx:04Dst9237 Ordered    Allergies    1   Wellbutrin TABS    Signatures   Electronically signed by : PEDRO Dunn; May 22 2017 12:05PM EST                       (Author)

## 2018-01-16 NOTE — RESULT NOTES
Discussion/Summary  Your results are normal  Please keep your regularly scheduled follow-up appointment  If you do not have a follow-up scheduled, please call the office to schedule a follow-up visit  Signatures   Electronically signed by : ALEX Neal RD; Mar 28 2017  2:31PM EST                       (Author)    Electronically signed by :  DAMARIS Fernandes Si ; Mar 29 2017  8:22AM EST

## 2018-01-16 NOTE — RESULT NOTES
Verified Results  * US ABDOMEN COMPLETE [id number] 07:39AM Jose Breed Order Number: [id number]    - Patient Instructions: To schedule this appointment, please contact Central Scheduling at [phone]  Test Name Result Flag Reference   US ABDOMEN COMPLETE (Report)     ABDOMEN ULTRASOUND, COMPLETE     INDICATION: Abdominal pain  Elevated liver enzymes  COMPARISON: None  TECHNIQUE:  Real-time ultrasound of the abdomen was performed with a curvilinear transducer with both volumetric sweeps and still imaging techniques  FINDINGS:     PANCREAS: Visualized portions show no abnormality  AORTA AND IVC: Visualized portions are normal for patient age  LIVER:   Hepatomegaly is noted  Hepatic parenchyma is echogenic consistent with fatty infiltration  No evidence of mass  Normal directional flow is present within the portal vein  BILIARY:   The gallbladder is normal in caliber  No wall thickening or pericholecystic fluid  No stones or sludge  No sonographic Goodrich's sign  No intrahepatic biliary dilatation  CBD measures 3 mm  No choledocholithiasis  KIDNEYS:    Right kidney measures 11 1 x 4 5 cm  Within normal limits  Left kidney measures 11 2 x 5 8 cm  Within normal limits  SPLEEN:    Within normal limits  ASCITES: None  IMPRESSION:   Hepatomegaly and hepatic steatosis  Workstation performed: [id number]     Signed by:    Chapin French MD   3/24/17

## 2018-01-16 NOTE — MISCELLANEOUS
Message  Pt called office today because after lunch she started having pain and pressure in her chest area  She said she had pork for lunch and after that is when it started  Told pt to try warm liquids because there could be something stuck  She said regular water came right back up  I told pt to take small sips and give it time and it should get better  Pt called back in an hour and said she still can't get water to go down but the pain isn't as bad and it feels like whatever was stuck it is moving down  Told pt to keep trying to sip and give it time to keep going down  Told her if she was still unable to tolerate liquids, develops a fever or the pain gets worse to call the answering service for the on call doctor tonight  She understood and said she would  Active Problems    1  Cardiac murmur (785 2) (R01 1)   2  Depression (311) (F32 9)   3  Encounter for gynecological examination (V72 31) (Z01 419)   4  Fatty infiltration of liver (571 8) (K76 0)   5  Herpes simplex infection (054 9) (B00 9)   6  Hypertension (401 9) (I10)   7  Morbid obesity with BMI of 40 0-44 9, adult (278 01,V85 41) (E66 01,Z68 41)   8  Need for prophylactic vaccination and inoculation against influenza (V04 81) (Z23)   9  Routine Gynecological Exam With Cervical Pap Smear   10  Status post gastric bypass for obesity (V45 86) (Z98 84)   11  History of Transaminitis (790 4) (R74 0)    Current Meds   1  Bariatric Fusion Oral Tablet Chewable; Therapy: (Recorded:09Jun2017) to Recorded   2  FLUoxetine HCl - 20 MG Oral Capsule (PROzac); take one capsule by mouth daily; Therapy: 84LUW4096 to ([phone])  Requested for: 09Ebs4821; Last   Rx:59Gir6318 Ordered   3  Iron TABS; Therapy: (Recorded:09Jun2017) to Recorded   4  Omeprazole 20 MG Oral Capsule Delayed Release; TAKE ONE CAPSULE BY MOUTH   EVERY DAY; Therapy: 32IQG5934 to ([facility])  Requested for: 02TJP7516; Last   Rx:11Jir1227 Ordered   5  Oxycodone-Acetaminophen 5-325 MG Oral Tablet (Percocet); TAKE 1 TABLET EVERY 4   TO 6 HOURS AS NEEDED FOR PAIN;   Therapy: 50PCS4268 to (Evaluate:44Nah3296); Last Rx:24Mqi1750 Ordered   6  UPCal D 500-250 POWD;   Therapy: (HLITIQFA:80PEN2770) to Recorded   7  ValACYclovir HCl - 500 MG Oral Tablet; TAKE 1 TABLET EVERY DAY; Therapy: 08GIN5609 to (Evaluate:72Unn5412)  Requested for: 53DSW5532; Last   Rx:48Psb4181 Ordered    Allergies    1   Wellbutrin TABS    Signatures   Electronically signed by : Ramo Skelton LPN; Jul 17 2515  8:11UA EST                       (Author)

## 2018-01-17 NOTE — MISCELLANEOUS
Assessment    1  History of Gastric Surgery For Morbid Obesity Bypass With Radha-en-Y   2  History of Diagnostic Esophagogastroduodenoscopy   3  Morbid obesity with BMI of 40 0-44 9, adult (278 01,V85 41) (E66 01,Z68 41)   4  Cardiac murmur (785 2) (R01 1)   5  Depression (311) (F32 9)   6  Hypertension (401 9) (I10)   7  Status post gastric bypass for obesity (V45 86) (Z98 84)    Plan  Cardiac murmur, Depression, Hypertension, Morbid obesity with BMI of 40 0-44 9, adult,  Status post gastric bypass for obesity    · Follow-up as previously scheduled Evaluation and Treatment  Follow-up  Status:  Complete  Done: 65IPT3139   Ordered; For: Cardiac murmur, Depression, Hypertension, Morbid obesity with BMI of 40 0-44 9, adult, Status post gastric bypass for obesity; Ordered By: Samaria Ahn Performed:  Due: 91EFB7855   · Begin or continue regular aerobic exercise  Gradually work up to at least {count1}  sessions of {dur1} of exercise a week ; Status:Complete;   Done: 56UYE8873   Ordered; For:Cardiac murmur, Depression, Hypertension, Morbid obesity with BMI of 40 0-44 9, adult, Status post gastric bypass for obesity; Ordered By:Eloy Alonzo;   · Continue with our present treatment plan ; Status:Complete;   Done: 54HOR9130   Ordered; For:Cardiac murmur, Depression, Hypertension, Morbid obesity with BMI of 40 0-44 9, adult, Status post gastric bypass for obesity; Ordered By:Eloy Alonzo;   · We recommend that you bring your body mass index down to 26 ; Status:Complete;    Done: 01REQ2415   Ordered; For:Cardiac murmur, Depression, Hypertension, Morbid obesity with BMI of 40 0-44 9, adult, Status post gastric bypass for obesity; Ordered By:Eloy Alonzo;  Hypertension    · Atenolol 50 MG Oral Tablet; Take 1/2 tablet (25mg) once  daily   Rx By: Samaria Ahn; Dispense: 90 Days ; #:90 TAB; Refill: 3; For: Hypertension; SHARAD = N; Record    Discussion/Summary  Discussion Summary:   One   Morbid obesity, status post gastric bypass/EGD doing well follow-up with bariatric specialist  #2  Hypertension stable on atenolol 25 patient to monitored home his systolic blood pressure is less than 110 call the office  #3  Cardiac murmur, echocardiogram and cardiology consultation discussed    Trace mitral and tricuspid regurgitation, physiologic  #4  Depression, stable continue present therapy  #5  Return at scheduled appointment, we'll see sooner if needed  Medication SE Review and Pt Understands Tx: Possible side effects of new medications were reviewed with the patient/guardian today  The treatment plan was reviewed with the patient/guardian  The patient/guardian understands and agrees with the treatment plan   Self Referrals:   Self Referrals: No      Chief Complaint  Chief Complaint Free Text Note Form: Patient for follow-up on LUCILLE of gastric bypass discharge 6/1/17      History of Present Illness  TCM Communication [facility]: The patient is being contacted for follow-up after hospitalization  She was hospitalized at [facility]  The date of admission: 05/30/2017, date of discharge: 06/01/2017  Diagnosis: Gastric Bypass Surgery  The doctor also lowered her bp medicine Atenolol to 25 mg wayne  She was discharged to home  Follow-up appointments with other specialists: Jennifer Sterling is scheduled for a follow up with Yolanda Melton on 06/13/2017 at 1000 am    Communication performed and completed by Bony Love   HPI: Patient doing well  Carloz 2 weeks status post gastric bypass/EGD, patient atenolol was decreased to 25 mg daily blood pressures been stable patient's monitoring inside home  Review of Systems  Complete-Female:   Constitutional: No fever, no chills, feels well, no tiredness, no recent weight gain or weight loss  Eyes: No complaints of eye pain, no red eyes, no eyesight problems, no discharge, no dry eyes, no itching of eyes     ENT: no complaints of earache, no loss of hearing, no nose bleeds, no nasal discharge, no sore throat, no hoarseness  Cardiovascular: No complaints of slow heart rate, no fast heart rate, no chest pain, no palpitations, no leg claudication, no lower extremity edema  Respiratory: No complaints of shortness of breath, no wheezing, no cough, no SOB on exertion, no orthopnea, no PND  Gastrointestinal: as noted in HPI  Genitourinary: No complaints of dysuria, no incontinence, no pelvic pain, no dysmenorrhea, no vaginal discharge or bleeding  Musculoskeletal: No complaints of arthralgias, no myalgias, no joint swelling or stiffness, no limb pain or swelling  Integumentary: No complaints of skin rash or lesions, no itching, no skin wounds, no breast pain or lump  Neurological: No complaints of headache, no confusion, no convulsions, no numbness, no dizziness or fainting, no tingling, no limb weakness, no difficulty walking  Psychiatric: Not suicidal, no sleep disturbance, no anxiety or depression, no change in personality, no emotional problems  Endocrine: No complaints of proptosis, no hot flashes, no muscle weakness, no deepening of the voice, no feelings of weakness  Hematologic/Lymphatic: No complaints of swollen glands, no swollen glands in the neck, does not bleed easily, does not bruise easily  Active Problems     1  Cardiac murmur (785 2) (R01 1)   2  Depression (311) (F32 9)   3  Encounter for gynecological examination (V72 31) (Z01 419)   4  Fatty infiltration of liver (571 8) (K76 0)   5  Herpes simplex infection (054 9) (B00 9)   6  Hypertension (401 9) (I10)   7  Need for prophylactic vaccination and inoculation against influenza (V04 81) (Z23)   8  Routine Gynecological Exam With Cervical Pap Smear   9  History of Transaminitis (790 4) (R74 0)    Morbid obesity with BMI of 40 0-44 9, adult (278 01) (E66 01)          Past Medical History    1  History of Acute sinusitis (461 9) (J01 90)   2  History of Allergic rhinitis (477 9) (J30 9)   3   History of Chronic Pneumonitis (486)   4  History of Foot Pain (Soft Tissue) (729 5)   5  History of Need for prophylactic vaccination and inoculation against influenza (V04 81)   (Z23)   6  History of Transaminitis (790 4) (R74 0)    Surgical History     1  History of Gynecologic Services Thermal Endometrial Ablation   2  History of Tubal Ligation    History of  Section          Family History  Mother    1  Family history of Anxiety (Symptom)   2  Family history of Depression   3  Family history of Family Health Status Of Mother - Alive  Father    4  Family history of Family Health Status Of Father - Alive   5  Family history of Hypertension (V17 49)  Maternal Grandmother    6  Family history of Coronary Artery Disease (V17 49)  Maternal Grandfather    7  Family history of Stroke Syndrome (V17 1)  Family History Reviewed: The family history was reviewed and updated today  Social History    · Being A Social Drinker   · Denied: History of Drug use   · Employed   · Never a smoker   · No secondhand smoke exposure (V49 89) (Z78 9)  Social History Reviewed: The social history was reviewed and updated today  Current Meds   1  Atenolol 50 MG Oral Tablet; Take 1 tablet daily; Therapy: 64Hcn6032 to (Evaluate:82Mcn2323)  Requested for: 11Lvv0568; Last   Rx:42Xtt6177 Ordered   2  FLUoxetine HCl - 20 MG Oral Capsule; take one capsule by mouth daily; Therapy: 15MLC1027 to ()  Requested for: 27Cej5955; Last   Rx:56Ucv1372 Ordered   3  Omeprazole 20 MG Oral Capsule Delayed Release; TAKE ONE CAPSULE BY MOUTH   EVERY DAY; Therapy: 74ECB3025 to (Janette Rodríguez)  Requested for: 79QFC6662; Last   Rx:62Scn5463 Ordered   4  Oxycodone-Acetaminophen 5-325 MG Oral Tablet; TAKE 1 TABLET EVERY 4 TO 6   HOURS AS NEEDED FOR PAIN;   Therapy: 64HTT3745 to (Evaluate:67Kza8257); Last Rx:66Qfw4734 Ordered   5  ValACYclovir HCl - 500 MG Oral Tablet; TAKE 1 TABLET EVERY DAY;    Therapy: 11TKO9725 to (Evaluate:01Xbd1681)  Requested for: 38GDC5124; Last   Rx:87Iki9556 Ordered  Medication List Reviewed: The medication list was reviewed and updated today  Allergies    1  Wellbutrin TABS    Vitals  Signs   Recorded: 13Jun2017 09:54AM   Heart Rate: 64  Respiration: 16  Systolic: 679  Diastolic: 72  Height: 5 ft   Weight: 192 lb 6 08 oz  BMI Calculated: 37 57  BSA Calculated: 1 83    Physical Exam    Constitutional   General appearance: No acute distress, well appearing and well nourished  Eyes   Conjunctiva and lids: No swelling, erythema or discharge  Ears, Nose, Mouth, and Throat Mucous membranes are moist    Pulmonary   Respiratory effort: No increased work of breathing or signs of respiratory distress  Auscultation of lungs: Clear to auscultation  Cardiovascular   Palpation of heart: Normal PMI, no thrills  Auscultation of heart: Abnormal   Very soft systolic ejection murmur  Examination of extremities for edema and/or varicosities: Normal     Abdomen Soft nontender positive bowel sounds  Musculoskeletal   Gait and station: Normal     Skin Abdominal incisions are clean and dry no dehiscence or infection  Neurologic Negative gross focal motor deficit     Psychiatric   Mood and affect: Normal          Future Appointments    Date/Time Provider Specialty Site   09/21/2017 03:30 PM Radha Alatorre, [address] [facility]   08/21/2017 08:30 AM Nessa Alonzo DO [facility]   07/11/2017 08:30 AM KONG Moreno, RD Dietitian [facility]     Signatures   Electronically signed by : Abbi Gann DO; Jun 13 2017 10:22AM EST                       (Author)

## 2018-01-17 NOTE — RESULT NOTES
Verified Results  (1) HEPATIC FUNCTION PANEL 81Bgs6215 08:52AM Eloy Alonzo     Test Name Result Flag Reference   PROTEIN, TOTAL 7 1 g/dL  6 1-8 1   ALBUMIN 4 4 g/dL  3 6-5 1   GLOBULIN 2 7 g/dL (calc)  1 9-3 7   ALBUMIN/GLOBULIN RATIO 1 6 (calc)  1 0-2 5   BILIRUBIN, TOTAL 0 6 mg/dL  0 2-1 2   BILIRUBIN, DIRECT 0 1 mg/dL  < OR = 0 2   BILIRUBIN, INDIRECT 0 5 mg/dL (calc)  0 2-1 2   ALKALINE PHOSPHATASE 71 U/L     AST 17 U/L  10-30   ALT 27 U/L  6-29     (Q) ACUTE HEPATITIS PANEL (REFL) 67FCB7095 08:52AM Eloy Alonzo   REPORT COMMENT:  FASTING:YES     Test Name Result Flag Reference   HEPATITIS A IGM NON-REACTIVE  NON-REACTIVE   HEPATITIS B SURFACE$ANTIGEN NON-REACTIVE  NON-REACTIVE   HEPATITIS B CORE$ANTIBODY (IGM) NON-REACTIVE  NON-REACTIVE   HEPATITIS C ANTIBODY$(REFL) NON-REACTIVE  NON-REACTIVE   SIGNAL TO CUT-OFF 0 02  <1 00   REFLEX TIQ      OUR RECORDS INDICATE THAT YOU HAVE ORDERED ACUTE HEPATITIS PANEL  ORDER CODE 75966  THIS IS A REFLEX-SPECIFIC ORDER CODE  HOWEVER, ONLY THE INITIAL TEST WAS PERFORMED, BECAUSE WE DO NOT  HAVE A REFLEX TESTING AUTHORIZATION FORM ON FILE FOR YOU  TO   PERFORM A REFLEX TEST WE NEED YOU TO SIGN AN AUTHORIZATION FORM   SPECIFYING (A) THE REFLEXIVE TEST AND (B) THE RESULTS THAT WILL   TRIGGER THE PERFORMANCE OF THE REFLEX TEST  PLEASE CONTACT THE    AT [place] IF YOU WOULD   LIKE ADDITIONAL TESTING DONE OR CONTACT YOUR    TO OBTAIN A COPY OF THE REFLEXIVE TESTING AUTHORIZATION FORM

## 2018-01-22 VITALS
WEIGHT: 192.38 LBS | DIASTOLIC BLOOD PRESSURE: 72 MMHG | RESPIRATION RATE: 16 BRPM | BODY MASS INDEX: 37.77 KG/M2 | SYSTOLIC BLOOD PRESSURE: 124 MMHG | HEIGHT: 60 IN | HEART RATE: 64 BPM

## 2018-01-22 VITALS
HEART RATE: 72 BPM | SYSTOLIC BLOOD PRESSURE: 108 MMHG | HEIGHT: 60 IN | DIASTOLIC BLOOD PRESSURE: 70 MMHG | RESPIRATION RATE: 18 BRPM | TEMPERATURE: 98.2 F | BODY MASS INDEX: 26.21 KG/M2 | WEIGHT: 133.5 LBS

## 2018-01-23 NOTE — RESULT NOTES
Verified Results  (1) CBC/ PLT (NO DIFF) [id number] 09:18AM Juan Maldonado Order Number: [id number]     Test Name Result Flag Reference   HEMATOCRIT 39 2 %  34 8-46 1   HEMOGLOBIN 13 1 g/dL  11 5-15 4   MCHC 33 4 g/dL  31 4-37 4   MCH 29 8 pg  26 8-34 3   MCV 89 fL  82-98   PLATELET COUNT 859 Thousands/uL  149-390   RBC COUNT 4 39 Million/uL  3 81-5 12   RDW 13 1 %  11 6-15 1   WBC COUNT 4 33 Thousand/uL  4 31-10 16   MPV 12 4 fL  8 9-12 7

## 2018-01-23 NOTE — RESULT NOTES
Discussion/Summary   November 2017 labs reviewed  Your white blood cell count is low at 3 52  This can sometimes be seen during times of active rapid weight loss but should correct after that  White blood cells help the body fight off infection  If you are having frequent illness or infection, please review with PCP now otherwise review with PCP at a routine follow-up as this may need further evaluation  Your folate level is high at > 20  This is generally safe and o k       your parathyroid hormone is high at 77 5- Your parathyroid hormone (PTH) is high , which can indicate that you are losing calcium from your bones  Recommend that you take a TOTAL of 1500 mg-1900 mg of calcium per day (this includes the calcium in all your supplements added together)  Calcium needs to be taken in divided doses of 500 to 600 mg each  A lab slip will be provided at your office visit to get rechecked in 3-4 months  Your vitamin B12 level is low at 381-goal after gastric surgery is above 400   Low levels can affect your nerve health  Low levels can also lead to anemia and fatigue  Do NOT take GUMMIE multivitamins because they do not contain enough B vitamins  Alcohol intakes can also lower Vitamin b12 levels  As a gastric surgery patient, it is recommended to avoid alcohol intakes  Recommend that you take  an ORJQR019 mcg of vitamin B12 sublingually (under the tongue) per day  Please keep your routine office visit  If you do not have a scheduled routine appointment, please call the office to schedule it  Our office number is [phone]  If you have questions about your results, this will be discussed with you at your upcoming  visit  If you have gotten your most recent labs after your recent visit and have questions, please call the office  I look forward to seeing you at your next visit                         Verified Results  (1) CBC/ PLT (NO DIFF) [id number] 07:32AM Rohini Smith   TW Order Number: [id number]     Test Name Result Flag Reference   HEMATOCRIT 39 7 %  34 8-46 1   HEMOGLOBIN 13 3 g/dL  11 5-15 4   MCHC 33 5 g/dL  31 4-37 4   MCH 29 6 pg  26 8-34 3   MCV 88 fL  82-98   PLATELET COUNT 972 Thousands/uL  149-390   RBC COUNT 4 49 Million/uL  3 81-5 12   RDW 13 5 %  11 6-15 1   WBC COUNT 3 52 Thousand/uL L 4 31-10 16   MPV 12 0 fL  8 9-12 7     (1) COMPREHENSIVE METABOLIC PANEL [id number] 53:42FT [facility] Order Number: [id number]     Test Name Result Flag Reference   SODIUM 138 mmol/L  136-145   POTASSIUM 4 1 mmol/L  3 5-5 3   CHLORIDE 103 mmol/L  100-108   CARBON DIOXIDE 29 mmol/L  21-32   ANION GAP (CALC) 6 mmol/L  4-13   BLOOD UREA NITROGEN 8 mg/dL  5-25   CREATININE 0 52 mg/dL L 0 60-1 30   Standardized to IDMS reference method   CALCIUM 9 3 mg/dL  8 3-10 1   BILI, TOTAL 0 41 mg/dL  0 20-1 00   ALK PHOSPHATAS 97 U/L     ALT (SGPT) 22 U/L  12-78   Specimen collection should occur prior to Sulfasalazine and/or Sulfapyridine administration due to the potential for falsely depressed results  AST(SGOT) 18 U/L  5-45   Specimen collection should occur prior to Sulfasalazine administration due to the potential for falsely depressed results  ALBUMIN 3 8 g/dL  3 5-5 0   TOTAL PROTEIN 7 1 g/dL  6 4-8 2   eGFR 122 ml/min/1 73sq m     [facility] Disease Education Program recommendations are as follows:  GFR calculation is accurate only with a steady state creatinine  Chronic Kidney disease less than 60 ml/min/1 73 sq  meters  Kidney failure less than 15 ml/min/1 73 sq  meters  GLUCOSE FASTING 76 mg/dL  65-99   Specimen collection should occur prior to Sulfasalazine administration due to the potential for falsely depressed results  Specimen collection should occur prior to Sulfapyridine administration due to the potential for falsely elevated results       (1) FERRITIN [id number] 07:32AM [employer] Order Number: [id number]     Test Name Result Flag Reference   FERRITIN 36 ng/mL  8-388     (1) FOLATE [id number] 07:32AM Ridgeway Loll Order Number: [id number]     Test Name Result Flag Reference   FOLATE >20 0 ng/mL H 3 1-17 5     (1) LIPID PANEL, FASTING [id number] 07:32AM [employer] Order Number: [id number]     Test Name Result Flag Reference   CHOLESTEROL 148 mg/dL     HDL,DIRECT 51 mg/dL  40-60   Specimen collection should occur prior to Metamizole administration due to the potential for falsley depressed results  LDL CHOLESTEROL CALCULATED 84 mg/dL  0-100   Triglyceride:        Normal <150 mg/dl   Borderline High 150-199 mg/dl   High 200-499 mg/dl   Very High >499 mg/dl      Cholesterol:       Desirable <200 mg/dl    Borderline High 200-239 mg/dl    High >239 mg/dl      HDL Cholesterol:       High>59 mg/dL    Low <41 mg/dL      This screening LDL is a calculated result  It does not have the accuracy of the Direct Measured LDL in the monitoring of patients with hyperlipidemia and/or statin therapy  Direct Measure LDL (XZV746) must be ordered separately in these patients  TRIGLYCERIDES 64 mg/dL  <=150   Specimen collection should occur prior to N-Acetylcysteine or Metamizole administration due to the potential for falsely depressed results       (1) PTH N-TERMINAL (INTACT) [id number] 07:32AM [employer] Order Number: [id number]     Test Name Result Flag Reference   PARATHYROID HORMONE INTACT 77 5 pg/mL H 14 0-72 0     (1) VITAMIN A [id number] 07:32AM [employer] Order Number: [id number]     Test Name Result Flag Reference   VITAMIN A 23 ug/dL  20 - 65   Performed at:  [address]  [id number]  : Staci Horn MD, Phone:  [phone]     (1) VITAMIN B1, WHOLE BLOOD [id number] 07:32AM [employer] Order Number: [id number]     Test Name Result Flag Reference   VITAMIN B1, WHOLE BLOOD 103 8 nmol/L  66 5 - 200 0   This test was developed and its performance characteristics  determined by [facility]  It has not been cleared or approved  by the Food and Drug Administration  Performed at:  [address]  [id number]  : Chyna Melgoza MD, Phone:  [phone]     (1) VITAMIN B12 [id number] 07:32AM Kalee Factor Order Number: [id number]     Test Name Result Flag Reference   VITAMIN B12 387 pg/mL  100-900     (1) VITAMIN D 25-HYDROXY [id number] 07:32AM Kalee Factor Order Number: [id number]     Test Name Result Flag Reference   VIT D 25-HYDROX 33 4 ng/mL  30 0-100 0   This assay is a certified procedure of the CDC Vitamin D Standardization Certification Program (VDSCP)     Deficiency <20ng/ml   Insufficiency 20-30ng/ml   Sufficient  ng/ml     *Patients undergoing fluorescein dye angiography may retain small amounts of fluorescein in the body for 48-72 hours post procedure  Samples containing fluorescein can produce falsely elevated Vitamin D values  If the patient had this procedure, a specimen should be resubmitted post fluorescein clearance

## 2018-02-15 PROBLEM — K91.2 POSTGASTRECTOMY MALABSORPTION: Status: ACTIVE | Noted: 2017-06-07

## 2018-02-15 PROBLEM — J30.9 ALLERGIC RHINITIS: Status: ACTIVE | Noted: 2017-08-21

## 2018-02-15 PROBLEM — N25.81 SECONDARY HYPERPARATHYROIDISM (HCC): Status: ACTIVE | Noted: 2017-12-08

## 2018-02-15 PROBLEM — E53.8 LOW VITAMIN B12 LEVEL: Status: ACTIVE | Noted: 2017-12-08

## 2018-02-15 PROBLEM — R79.89 LOW VITAMIN B12 LEVEL: Status: ACTIVE | Noted: 2017-12-08

## 2018-02-15 PROBLEM — N83.201 RIGHT OVARIAN CYST: Status: ACTIVE | Noted: 2017-08-02

## 2018-02-15 PROBLEM — E66.9 OBESITY (BMI 30-39.9): Status: ACTIVE | Noted: 2017-09-21

## 2018-02-15 PROBLEM — Z90.3 POSTGASTRECTOMY MALABSORPTION: Status: ACTIVE | Noted: 2017-06-07

## 2018-02-15 PROBLEM — K76.0 FATTY INFILTRATION OF LIVER: Status: ACTIVE | Noted: 2017-03-24

## 2018-02-15 RX ORDER — FLUTICASONE PROPIONATE 50 MCG
2 SPRAY, SUSPENSION (ML) NASAL AS NEEDED
COMMUNITY
Start: 2017-08-21

## 2018-02-15 RX ORDER — OMEPRAZOLE 20 MG/1
1 CAPSULE, DELAYED RELEASE ORAL DAILY
COMMUNITY
Start: 2017-05-11 | End: 2018-02-19

## 2018-02-15 RX ORDER — FLUOXETINE HYDROCHLORIDE 20 MG/1
20 CAPSULE ORAL DAILY
Refills: 1 | COMMUNITY
Start: 2018-01-15 | End: 2018-04-11 | Stop reason: SDUPTHER

## 2018-02-15 RX ORDER — IRON,CARBONYL/ASCORBIC ACID 100-250 MG
TABLET ORAL
COMMUNITY
End: 2021-11-02

## 2018-02-19 ENCOUNTER — OFFICE VISIT (OUTPATIENT)
Dept: FAMILY MEDICINE CLINIC | Facility: CLINIC | Age: 38
End: 2018-02-19
Payer: COMMERCIAL

## 2018-02-19 VITALS
BODY MASS INDEX: 23.64 KG/M2 | RESPIRATION RATE: 20 BRPM | SYSTOLIC BLOOD PRESSURE: 98 MMHG | DIASTOLIC BLOOD PRESSURE: 64 MMHG | HEART RATE: 64 BPM | WEIGHT: 120.4 LBS | HEIGHT: 60 IN

## 2018-02-19 DIAGNOSIS — E53.8 LOW VITAMIN B12 LEVEL: ICD-10-CM

## 2018-02-19 DIAGNOSIS — K76.0 FATTY INFILTRATION OF LIVER: ICD-10-CM

## 2018-02-19 DIAGNOSIS — F32.A DEPRESSION, UNSPECIFIED DEPRESSION TYPE: Primary | ICD-10-CM

## 2018-02-19 DIAGNOSIS — Z90.3 POSTGASTRECTOMY MALABSORPTION: ICD-10-CM

## 2018-02-19 DIAGNOSIS — K91.2 POSTGASTRECTOMY MALABSORPTION: ICD-10-CM

## 2018-02-19 DIAGNOSIS — J30.9 ALLERGIC RHINITIS, UNSPECIFIED CHRONICITY, UNSPECIFIED SEASONALITY, UNSPECIFIED TRIGGER: ICD-10-CM

## 2018-02-19 PROCEDURE — 99214 OFFICE O/P EST MOD 30 MIN: CPT | Performed by: FAMILY MEDICINE

## 2018-02-19 NOTE — PROGRESS NOTES
Assessment/Plan:    No problem-specific Assessment & Plan notes found for this encounter  There are no diagnoses linked to this encounter  Subjective:      Patient ID: Rony Lanza is a 44 y o  female  Cc: Follow up and to review blood work results  R Chun    Patient doing well she has lost over 100 lb status post bariatric surgery being followed by bariatric surgeon  Patient today offers no medical complaints or concerns at the present time        The following portions of the patient's history were reviewed and updated as appropriate: allergies, current medications, past family history, past medical history, past social history, past surgical history and problem list     Review of Systems   Constitutional:        HPI   HENT: Negative  Eyes: Negative  Respiratory: Negative  Cardiovascular: Negative  Gastrointestinal:        HPI   Endocrine: Negative  Genitourinary: Negative  Musculoskeletal: Negative  Skin: Negative  Allergic/Immunologic: Negative  Neurological: Negative  Hematological: Negative  Psychiatric/Behavioral: Negative  Objective:      Vitals:    02/19/18 0819   BP: 98/64   BP Location: Left arm   Patient Position: Sitting   Cuff Size: Standard   Pulse: 64   Resp: 20   Weight: 54 6 kg (120 lb 6 4 oz)   Height: 5' (1 524 m)          Physical Exam   Constitutional: She is oriented to person, place, and time  She appears well-developed and well-nourished  HENT:   Head: Normocephalic  Mouth/Throat: Oropharynx is clear and moist    Eyes: Conjunctivae are normal    Neck: Neck supple  Cardiovascular: Normal rate, regular rhythm and normal heart sounds  Pulmonary/Chest: Effort normal and breath sounds normal    Abdominal: Soft  Musculoskeletal: She exhibits no edema  Neurological: She is alert and oriented to person, place, and time  Skin: Skin is warm and dry  Psychiatric: She has a normal mood and affect

## 2018-03-08 DIAGNOSIS — B00.9 HERPES SIMPLEX INFECTION: Primary | ICD-10-CM

## 2018-03-08 RX ORDER — VALACYCLOVIR HYDROCHLORIDE 500 MG/1
500 TABLET, FILM COATED ORAL DAILY
Qty: 90 TABLET | Refills: 3 | Status: SHIPPED | OUTPATIENT
Start: 2018-03-08 | End: 2019-03-14 | Stop reason: SDUPTHER

## 2018-04-11 DIAGNOSIS — F32.A DEPRESSION, UNSPECIFIED DEPRESSION TYPE: Primary | ICD-10-CM

## 2018-04-12 RX ORDER — FLUOXETINE HYDROCHLORIDE 20 MG/1
CAPSULE ORAL
Qty: 90 CAPSULE | Refills: 1 | Status: SHIPPED | OUTPATIENT
Start: 2018-04-12 | End: 2018-10-10 | Stop reason: SDUPTHER

## 2018-05-14 DIAGNOSIS — N25.81 SECONDARY HYPERPARATHYROIDISM OF RENAL ORIGIN (HCC): ICD-10-CM

## 2018-05-14 DIAGNOSIS — K91.2 POSTSURGICAL MALABSORPTION, NOT ELSEWHERE CLASSIFIED (CODE): ICD-10-CM

## 2018-05-14 DIAGNOSIS — E53.8 DEFICIENCY OF OTHER SPECIFIED B GROUP VITAMINS (CODE): ICD-10-CM

## 2018-05-14 DIAGNOSIS — Z98.84 BARIATRIC SURGERY STATUS: ICD-10-CM

## 2018-06-18 ENCOUNTER — TELEPHONE (OUTPATIENT)
Dept: BARIATRICS | Facility: CLINIC | Age: 38
End: 2018-06-18

## 2018-06-25 ENCOUNTER — OFFICE VISIT (OUTPATIENT)
Dept: BARIATRICS | Facility: CLINIC | Age: 38
End: 2018-06-25
Payer: COMMERCIAL

## 2018-06-25 VITALS
DIASTOLIC BLOOD PRESSURE: 80 MMHG | WEIGHT: 115.5 LBS | SYSTOLIC BLOOD PRESSURE: 132 MMHG | TEMPERATURE: 98.2 F | HEIGHT: 60 IN | RESPIRATION RATE: 20 BRPM | BODY MASS INDEX: 22.68 KG/M2 | HEART RATE: 72 BPM

## 2018-06-25 DIAGNOSIS — N25.81 SECONDARY HYPERPARATHYROIDISM (HCC): ICD-10-CM

## 2018-06-25 DIAGNOSIS — Z98.84 BARIATRIC SURGERY STATUS: Primary | ICD-10-CM

## 2018-06-25 DIAGNOSIS — K91.2 POSTSURGICAL MALABSORPTION: ICD-10-CM

## 2018-06-25 DIAGNOSIS — E53.8 LOW VITAMIN B12 LEVEL: ICD-10-CM

## 2018-06-25 PROBLEM — E66.01 MORBID OBESITY DUE TO EXCESS CALORIES (HCC): Status: RESOLVED | Noted: 2017-05-30 | Resolved: 2018-06-25

## 2018-06-25 PROBLEM — I10 HYPERTENSION: Status: RESOLVED | Noted: 2017-05-30 | Resolved: 2018-06-25

## 2018-06-25 PROBLEM — E66.9 OBESITY (BMI 30-39.9): Status: RESOLVED | Noted: 2017-09-21 | Resolved: 2018-06-25

## 2018-06-25 PROBLEM — J30.9 ALLERGIC RHINITIS: Status: RESOLVED | Noted: 2017-08-21 | Resolved: 2018-06-25

## 2018-06-25 PROCEDURE — 99214 OFFICE O/P EST MOD 30 MIN: CPT | Performed by: PHYSICIAN ASSISTANT

## 2018-06-25 RX ORDER — CYANOCOBALAMIN/FOLIC ACID 1MG-400MCG
TABLET, SUBLINGUAL SUBLINGUAL
COMMUNITY
End: 2018-10-09

## 2018-06-25 RX ORDER — MELATONIN
DAILY
COMMUNITY

## 2018-06-25 NOTE — ASSESSMENT & PLAN NOTE
By 11/17 labs  Has not had repeat labs done  Will check now   Advised on calcium and vitamin D supplements

## 2018-06-25 NOTE — PROGRESS NOTES
Assessment/Plan:    Secondary hyperparathyroidism ([facility] [address] )  By 11/17 labs  Has not had repeat labs done  Will check now  Advised on calcium and vitamin D supplements    Bariatric surgery status  -s/p Radha-En-Y Gastric Bypass with Dr Robe Zabala on 5/30/2017  Overall doing Well  (lost more than expected weight)-advised if she gains 5-15 lb back she will still be ok    Initial: 224 3 lb  Current: 115 5 lb  EWL:112%  Maximus: Current  Current BMI is Body mass index is 22 56 kg/m²  Tolerating a regular diet-yes  Eating at least 60 grams of protein per day-yes  Following 30/60 minute rule with liquids-yes  Drinking at least 64 ounces of fluid per day-yes  Drinking carbonated beverages-no  Sufficient exercise-yes  Using NSAIDs regularly-no  Using nicotine-no  Using alcohol-occasional glass of wine-rare-one glass/advised on effect of alcohol after gastric surgery and advised to abstain    Advised that goal is to at least maintain weight now but if she gains 5-15 lb back she will be o k  With some excess skin on board advised she is likely at lower end of ideal body weight range now  She appears to be eating good balance of protein and non-protein foods  Low vitamin B12 level  She is taking an extra 1000 mcg of sublingual vitamin B12  Will await labs for further advise  Advised that alcohol intakes can also lower vitamin b12 levels  Postsurgical malabsorption  -At risk for malabsorption of vitamins/minerals secondary to malabsorption and restriction of intake from their procedure  -Currently taking adequate postop bariatric surgery vitamin supplementation-yes  -Last set of bariatric labs completed on 11/17 and are not within acceptable limits   -Next set of bariatric labs ordered for approximately 2 weeks     She is taking 4 bariatric fusion, one calcium, an extra ?  High potency iron, and an extra 2000 IU vitamin D3           Diagnoses and all orders for this visit:    Bariatric surgery status  -     CBC and Platelet; Future  -     Comprehensive metabolic panel; Future  -     Copper Level; Future  -     Ferritin; Future  -     Folate; Future  -     Iron Saturation %; Future  -     PTH, intact; Future  -     Vitamin A; Future  -     Vitamin B1, whole blood; Future  -     Vitamin B12; Future  -     Vitamin D 25 hydroxy; Future  -     Zinc; Future    Postsurgical malabsorption  -     CBC and Platelet; Future  -     Comprehensive metabolic panel; Future  -     Copper Level; Future  -     Ferritin; Future  -     Folate; Future  -     Iron Saturation %; Future  -     PTH, intact; Future  -     Vitamin A; Future  -     Vitamin B1, whole blood; Future  -     Vitamin B12; Future  -     Vitamin D 25 hydroxy; Future  -     Zinc; Future    Low vitamin B12 level  -     CBC and Platelet; Future  -     Comprehensive metabolic panel; Future  -     Copper Level; Future  -     Ferritin; Future  -     Folate; Future  -     Iron Saturation %; Future  -     PTH, intact; Future  -     Vitamin A; Future  -     Vitamin B1, whole blood; Future  -     Vitamin B12; Future  -     Vitamin D 25 hydroxy; Future  -     Zinc; Future    Secondary hyperparathyroidism (HCC)  -     CBC and Platelet; Future  -     Comprehensive metabolic panel; Future  -     Copper Level; Future  -     Ferritin; Future  -     Folate; Future  -     Iron Saturation %; Future  -     PTH, intact; Future  -     Vitamin A; Future  -     Vitamin B1, whole blood; Future  -     Vitamin B12; Future  -     Vitamin D 25 hydroxy; Future  -     Zinc; Future    Other orders  -     Cobalamine Combinations (B-12) 1000-400 MCG SUBL; Place under the tongue  -     cholecalciferol (VITAMIN D3) 1,000 units tablet; Take by mouth daily          Subjective:      Patient ID: Migel Baltazar is a 44 y o  female  She is here in routine follow-up   She is tolerating a regular diet  Walking and doing some weights for exercise  She is taking bariatric supplements   Has no complaints today        The following portions of the patient's history were reviewed and updated as appropriate: allergies, current medications, past family history, past medical history, past social history, past surgical history and problem list     Review of Systems   Constitutional: Negative for chills and fever  Unexpected weight change: planned weight loss  Respiratory: Negative for shortness of breath and wheezing  Cardiovascular: Negative for chest pain and palpitations  Gastrointestinal: Negative for abdominal pain, constipation, diarrhea, nausea and vomiting  Psychiatric/Behavioral: Suicidal ideas: no complait of anxiety or depression  Objective:      /80   Pulse 72   Temp 98 2 °F (36 8 °C)   Resp 20   Ht 5' (1 524 m)   Wt 52 4 kg (115 lb 8 oz)   BMI 22 56 kg/m²          Physical Exam   Constitutional: She is oriented to person, place, and time  She appears well-developed and well-nourished  Thin appearing   HENT:   Mouth/Throat: Oropharynx is clear and moist    Eyes: Conjunctivae are normal  No scleral icterus  Cardiovascular: Normal rate and regular rhythm  Pulmonary/Chest: Effort normal and breath sounds normal    Abdominal: Soft  There is no tenderness  No incisional hernias appreciated   Musculoskeletal:   Normal gait   Neurological: She is alert and oriented to person, place, and time  Psychiatric: She has a normal mood and affect  Her behavior is normal  Judgment and thought content normal    Nursing note and vitals reviewed        Maintain weight loss with good nutrition intakes  Normal vitamin and mineral levels  Exercise as tolerated

## 2018-06-25 NOTE — ASSESSMENT & PLAN NOTE
She is taking an extra 1000 mcg of sublingual vitamin B12  Will await labs for further advise  Advised that alcohol intakes can also lower vitamin b12 levels

## 2018-06-25 NOTE — ASSESSMENT & PLAN NOTE
-At risk for malabsorption of vitamins/minerals secondary to malabsorption and restriction of intake from their procedure  -Currently taking adequate postop bariatric surgery vitamin supplementation-yes  -Last set of bariatric labs completed on 11/17 and are not within acceptable limits   -Next set of bariatric labs ordered for approximately 2 weeks     She is taking 4 bariatric fusion, one calcium, an extra ?  High potency iron, and an extra 2000 IU vitamin D3

## 2018-06-25 NOTE — PATIENT INSTRUCTIONS
Follow-up in one year  We kindly ask that you arrive 15 minutes before your scheduled appointment time with your provider to allow you to be roomed, have your vital signs checked and your chart updated by our staff  We thank you for your patience at your visit  Follow diet as discussed  Follow  vitamin and mineral recommendations as reviewed with you  Exercise as tolerated    If you have gotten a lab slip at this visit, please note that most labs are FASTING-but you need to drink water the night before and the morning before your labs are done  It is HIGHLY RECOMMENDED that you check with your insurance to make sure all the labs ordered are covered by your individual insurance policy  This is especially important if you also get labs done by other providers outside of [facility]  You want to avoid having duplicate labs done  Note you will be given a lab slip AFTER  your annual visit next year to check your vitamin and mineral levels  You will not need to make a second appointment after the labs are received/reviewed  You will receive a letter/and or phone call with your results  Most labs do NOT honor a lab slip dated one year in advance now  Call our office if he have any problems with abdominal pain especially if associated with fever, chills, nausea, vomiting or any other concerns  All  Post-bariatric surgery patients should be aware that very small quantities of any alcohol  can cause impairment and it is very possible not to feel the effect  The effect can be in the system for several hours  It is also a stomach irritant  It is advised to AVOID alcohol, Nonsteroidal antiinflammatory drugs (NSAIDS) and nicotine of all forms   Any of these can cause stomach irritation/pain

## 2018-06-25 NOTE — ASSESSMENT & PLAN NOTE
-s/p Radha-En-Y Gastric Bypass with Dr Maryann Aranda on 5/30/2017  Overall doing Well  (lost more than expected weight)-advised if she gains 5-15 lb back she will still be ok    Initial: 224 3 lb  Current: 115 5 lb  EWL:112%  Maximus: Current  Current BMI is Body mass index is 22 56 kg/m²  Tolerating a regular diet-yes  Eating at least 60 grams of protein per day-yes  Following 30/60 minute rule with liquids-yes  Drinking at least 64 ounces of fluid per day-yes  Drinking carbonated beverages-no  Sufficient exercise-yes  Using NSAIDs regularly-no  Using nicotine-no  Using alcohol-occasional glass of wine-rare-one glass/advised on effect of alcohol after gastric surgery and advised to abstain    Advised that goal is to at least maintain weight now but if she gains 5-15 lb back she will be o k  With some excess skin on board advised she is likely at lower end of ideal body weight range now  She appears to be eating good balance of protein and non-protein foods

## 2018-07-20 ENCOUNTER — APPOINTMENT (OUTPATIENT)
Dept: LAB | Facility: CLINIC | Age: 38
End: 2018-07-20
Payer: COMMERCIAL

## 2018-07-20 DIAGNOSIS — K91.2 POSTGASTRECTOMY MALABSORPTION: ICD-10-CM

## 2018-07-20 DIAGNOSIS — N25.81 SECONDARY HYPERPARATHYROIDISM (HCC): ICD-10-CM

## 2018-07-20 DIAGNOSIS — J30.9 ALLERGIC RHINITIS: ICD-10-CM

## 2018-07-20 DIAGNOSIS — Z90.3 POSTGASTRECTOMY MALABSORPTION: ICD-10-CM

## 2018-07-20 DIAGNOSIS — E53.8 LOW VITAMIN B12 LEVEL: ICD-10-CM

## 2018-07-20 DIAGNOSIS — K76.0 FATTY INFILTRATION OF LIVER: ICD-10-CM

## 2018-07-20 DIAGNOSIS — K91.2 POSTSURGICAL MALABSORPTION: ICD-10-CM

## 2018-07-20 DIAGNOSIS — Z98.84 BARIATRIC SURGERY STATUS: ICD-10-CM

## 2018-07-20 DIAGNOSIS — F32.A DEPRESSION, UNSPECIFIED DEPRESSION TYPE: ICD-10-CM

## 2018-07-20 LAB
25(OH)D3 SERPL-MCNC: 39 NG/ML (ref 30–100)
ALBUMIN SERPL BCP-MCNC: 4 G/DL (ref 3.5–5)
ALP SERPL-CCNC: 97 U/L (ref 46–116)
ALT SERPL W P-5'-P-CCNC: 29 U/L (ref 12–78)
ANION GAP SERPL CALCULATED.3IONS-SCNC: 5 MMOL/L (ref 4–13)
AST SERPL W P-5'-P-CCNC: 17 U/L (ref 5–45)
BILIRUB SERPL-MCNC: 0.75 MG/DL (ref 0.2–1)
BUN SERPL-MCNC: 7 MG/DL (ref 5–25)
CALCIUM SERPL-MCNC: 8.8 MG/DL (ref 8.3–10.1)
CHLORIDE SERPL-SCNC: 105 MMOL/L (ref 100–108)
CO2 SERPL-SCNC: 28 MMOL/L (ref 21–32)
CREAT SERPL-MCNC: 0.55 MG/DL (ref 0.6–1.3)
ERYTHROCYTE [DISTWIDTH] IN BLOOD BY AUTOMATED COUNT: 11.9 % (ref 11.6–15.1)
FERRITIN SERPL-MCNC: 29 NG/ML (ref 8–388)
FOLATE SERPL-MCNC: 17.7 NG/ML (ref 3.1–17.5)
GFR SERPL CREATININE-BSD FRML MDRD: 119 ML/MIN/1.73SQ M
GLUCOSE P FAST SERPL-MCNC: 80 MG/DL (ref 65–99)
HCT VFR BLD AUTO: 38 % (ref 34.8–46.1)
HGB BLD-MCNC: 12.7 G/DL (ref 11.5–15.4)
IRON SATN MFR SERPL: 34 %
IRON SERPL-MCNC: 98 UG/DL (ref 50–170)
MCH RBC QN AUTO: 31.1 PG (ref 26.8–34.3)
MCHC RBC AUTO-ENTMCNC: 33.4 G/DL (ref 31.4–37.4)
MCV RBC AUTO: 93 FL (ref 82–98)
PLATELET # BLD AUTO: 279 THOUSANDS/UL (ref 149–390)
PMV BLD AUTO: 11.3 FL (ref 8.9–12.7)
POTASSIUM SERPL-SCNC: 3.8 MMOL/L (ref 3.5–5.3)
PROT SERPL-MCNC: 6.9 G/DL (ref 6.4–8.2)
PTH-INTACT SERPL-MCNC: 80.4 PG/ML (ref 18.4–80.1)
RBC # BLD AUTO: 4.09 MILLION/UL (ref 3.81–5.12)
SODIUM SERPL-SCNC: 138 MMOL/L (ref 136–145)
TIBC SERPL-MCNC: 287 UG/DL (ref 250–450)
VIT B12 SERPL-MCNC: 3294 PG/ML (ref 100–900)
WBC # BLD AUTO: 3.44 THOUSAND/UL (ref 4.31–10.16)

## 2018-07-20 PROCEDURE — 84425 ASSAY OF VITAMIN B-1: CPT

## 2018-07-20 PROCEDURE — 80053 COMPREHEN METABOLIC PANEL: CPT

## 2018-07-20 PROCEDURE — 82607 VITAMIN B-12: CPT

## 2018-07-20 PROCEDURE — 83970 ASSAY OF PARATHORMONE: CPT

## 2018-07-20 PROCEDURE — 82728 ASSAY OF FERRITIN: CPT

## 2018-07-20 PROCEDURE — 83540 ASSAY OF IRON: CPT

## 2018-07-20 PROCEDURE — 85027 COMPLETE CBC AUTOMATED: CPT

## 2018-07-20 PROCEDURE — 36415 COLL VENOUS BLD VENIPUNCTURE: CPT

## 2018-07-20 PROCEDURE — 84590 ASSAY OF VITAMIN A: CPT

## 2018-07-20 PROCEDURE — 82746 ASSAY OF FOLIC ACID SERUM: CPT

## 2018-07-20 PROCEDURE — 82308 ASSAY OF CALCITONIN: CPT

## 2018-07-20 PROCEDURE — 83550 IRON BINDING TEST: CPT

## 2018-07-20 PROCEDURE — 82306 VITAMIN D 25 HYDROXY: CPT

## 2018-07-23 DIAGNOSIS — D72.819 LEUKOPENIA, UNSPECIFIED TYPE: Primary | ICD-10-CM

## 2018-07-23 LAB — CALCIT SERPL-MCNC: <2 PG/ML (ref 0–5)

## 2018-07-25 LAB — VIT B1 BLD-SCNC: 125.6 NMOL/L (ref 66.5–200)

## 2018-08-01 LAB — VIT A SERPL-MCNC: 26.6 UG/DL (ref 31.2–89.1)

## 2018-08-21 ENCOUNTER — OFFICE VISIT (OUTPATIENT)
Dept: FAMILY MEDICINE CLINIC | Facility: CLINIC | Age: 38
End: 2018-08-21
Payer: COMMERCIAL

## 2018-08-21 VITALS
DIASTOLIC BLOOD PRESSURE: 76 MMHG | BODY MASS INDEX: 22.58 KG/M2 | HEART RATE: 80 BPM | WEIGHT: 115 LBS | HEIGHT: 60 IN | SYSTOLIC BLOOD PRESSURE: 124 MMHG | RESPIRATION RATE: 20 BRPM

## 2018-08-21 DIAGNOSIS — F32.A DEPRESSION, UNSPECIFIED DEPRESSION TYPE: ICD-10-CM

## 2018-08-21 DIAGNOSIS — E53.8 LOW VITAMIN B12 LEVEL: ICD-10-CM

## 2018-08-21 DIAGNOSIS — E50.9 VITAMIN A DEFICIENCY: ICD-10-CM

## 2018-08-21 DIAGNOSIS — Z98.84 BARIATRIC SURGERY STATUS: ICD-10-CM

## 2018-08-21 DIAGNOSIS — K91.2 POSTSURGICAL MALABSORPTION: Primary | ICD-10-CM

## 2018-08-21 DIAGNOSIS — N25.81 SECONDARY HYPERPARATHYROIDISM (HCC): ICD-10-CM

## 2018-08-21 DIAGNOSIS — D72.819 LEUKOPENIA, UNSPECIFIED TYPE: ICD-10-CM

## 2018-08-21 DIAGNOSIS — R01.1 CARDIAC MURMUR: ICD-10-CM

## 2018-08-21 PROBLEM — K76.0 FATTY INFILTRATION OF LIVER: Status: RESOLVED | Noted: 2017-03-24 | Resolved: 2018-08-21

## 2018-08-21 PROCEDURE — 3008F BODY MASS INDEX DOCD: CPT | Performed by: FAMILY MEDICINE

## 2018-08-21 PROCEDURE — 99214 OFFICE O/P EST MOD 30 MIN: CPT | Performed by: FAMILY MEDICINE

## 2018-08-21 NOTE — PROGRESS NOTES
Assessment/Plan:  1  Malabsorption secondary to bariatric surgery/vitamin deficiencies a and B 12/secondary hyperparathyroidism, all stable patient follows up with [address] [facility] [address] bariatric  2  Leukopenia, mild patient to repeat CBC this week  3  History of cardiac murmur no murmur appreciated today  4  Depression, stable continue Prozac 20 mg daily  5  Recheck 6 months, sooner if needed        Vitamin A deficiency  Follows with bariatric    Postsurgical malabsorption  Follow up with bariatric    Secondary hyperparathyroidism ([facility] )  Follow up with bariatric    Low vitamin B12 level  Follow-up bariatric    Leukopenia  Repeat CBC has been ordered patient will complete this week    Depression  Stable continue present therapy    Cardiac murmur   murmur not appreciated today    Bariatric surgery status  Follow-up bariatric       Diagnoses and all orders for this visit:    Postsurgical malabsorption    Secondary hyperparathyroidism ([facility] [address] )    Low vitamin B12 level    Cardiac murmur    Bariatric surgery status    Depression, unspecified depression type    Leukopenia, unspecified type    Vitamin A deficiency          Subjective: pt here for a follow up and to review blood work results  PING Mccollum     Patient ID: Elsa Wagner is a 44 y o  female  Patient doing very well  Patient is following with bariatric so for her gastric bypass follow-up  Blood work was reviewed  Patient's depression is doing well she wishes to continue her Prozac as ordered        The following portions of the patient's history were reviewed and updated as appropriate: allergies, current medications, past family history, past medical history, past social history, past surgical history and problem list     Review of Systems   Constitutional: Negative  HENT: Negative  Eyes: Negative  Respiratory: Negative  Cardiovascular: Negative  Gastrointestinal:        HPI   Endocrine: Negative  Genitourinary: Negative      Musculoskeletal: Negative  Skin: Negative  Allergic/Immunologic: Negative  Neurological: Negative  Hematological: Negative  Psychiatric/Behavioral: Negative  Objective:      Vitals:    08/21/18 0820   BP: 124/76   BP Location: Left arm   Patient Position: Sitting   Cuff Size: Standard   Pulse: 80   Resp: 20   Weight: 52 2 kg (115 lb)   Height: 5' (1 524 m)          Physical Exam   Constitutional: She is oriented to person, place, and time  She appears well-developed and well-nourished  HENT:   Head: Normocephalic and atraumatic  Mouth/Throat: Oropharynx is clear and moist    Eyes: Conjunctivae and EOM are normal  Pupils are equal, round, and reactive to light  No scleral icterus  Neck: Neck supple  No thyromegaly present  Cardiovascular: Normal rate, regular rhythm and normal heart sounds  Pulmonary/Chest: Effort normal and breath sounds normal    Abdominal: Soft  Bowel sounds are normal  There is no tenderness  Musculoskeletal: She exhibits no edema  Lymphadenopathy:     She has no cervical adenopathy  Neurological: She is alert and oriented to person, place, and time  No cranial nerve deficit  Skin: Skin is warm and dry  Psychiatric: She has a normal mood and affect

## 2018-08-24 ENCOUNTER — APPOINTMENT (OUTPATIENT)
Dept: LAB | Facility: CLINIC | Age: 38
End: 2018-08-24
Payer: COMMERCIAL

## 2018-08-24 DIAGNOSIS — D72.819 LEUKOPENIA, UNSPECIFIED TYPE: ICD-10-CM

## 2018-08-24 LAB
ERYTHROCYTE [DISTWIDTH] IN BLOOD BY AUTOMATED COUNT: 11.6 % (ref 11.6–15.1)
HCT VFR BLD AUTO: 36.6 % (ref 34.8–46.1)
HGB BLD-MCNC: 12.3 G/DL (ref 11.5–15.4)
MCH RBC QN AUTO: 31.3 PG (ref 26.8–34.3)
MCHC RBC AUTO-ENTMCNC: 33.6 G/DL (ref 31.4–37.4)
MCV RBC AUTO: 93 FL (ref 82–98)
PLATELET # BLD AUTO: 266 THOUSANDS/UL (ref 149–390)
PMV BLD AUTO: 11.1 FL (ref 8.9–12.7)
RBC # BLD AUTO: 3.93 MILLION/UL (ref 3.81–5.12)
WBC # BLD AUTO: 3.29 THOUSAND/UL (ref 4.31–10.16)

## 2018-08-24 PROCEDURE — 85027 COMPLETE CBC AUTOMATED: CPT

## 2018-08-24 PROCEDURE — 36415 COLL VENOUS BLD VENIPUNCTURE: CPT

## 2018-08-29 DIAGNOSIS — D72.819 LEUKOPENIA, UNSPECIFIED TYPE: Primary | ICD-10-CM

## 2018-10-08 ENCOUNTER — APPOINTMENT (OUTPATIENT)
Dept: LAB | Facility: MEDICAL CENTER | Age: 38
End: 2018-10-08
Payer: COMMERCIAL

## 2018-10-08 ENCOUNTER — OFFICE VISIT (OUTPATIENT)
Dept: HEMATOLOGY ONCOLOGY | Facility: CLINIC | Age: 38
End: 2018-10-08
Payer: COMMERCIAL

## 2018-10-08 VITALS
SYSTOLIC BLOOD PRESSURE: 120 MMHG | HEIGHT: 60 IN | RESPIRATION RATE: 18 BRPM | BODY MASS INDEX: 21.99 KG/M2 | OXYGEN SATURATION: 99 % | HEART RATE: 64 BPM | WEIGHT: 112 LBS | TEMPERATURE: 97 F | DIASTOLIC BLOOD PRESSURE: 86 MMHG

## 2018-10-08 DIAGNOSIS — D72.819 LEUKOPENIA, UNSPECIFIED TYPE: ICD-10-CM

## 2018-10-08 LAB
BASOPHILS # BLD AUTO: 0.05 THOUSANDS/ΜL (ref 0–0.1)
BASOPHILS NFR BLD AUTO: 1 % (ref 0–1)
EOSINOPHIL # BLD AUTO: 0.04 THOUSAND/ΜL (ref 0–0.61)
EOSINOPHIL NFR BLD AUTO: 1 % (ref 0–6)
ERYTHROCYTE [DISTWIDTH] IN BLOOD BY AUTOMATED COUNT: 11.6 % (ref 11.6–15.1)
HCT VFR BLD AUTO: 41.7 % (ref 34.8–46.1)
HGB BLD-MCNC: 13.7 G/DL (ref 11.5–15.4)
IMM GRANULOCYTES # BLD AUTO: 0.01 THOUSAND/UL (ref 0–0.2)
IMM GRANULOCYTES NFR BLD AUTO: 0 % (ref 0–2)
LYMPHOCYTES # BLD AUTO: 0.82 THOUSANDS/ΜL (ref 0.6–4.47)
LYMPHOCYTES NFR BLD AUTO: 16 % (ref 14–44)
MCH RBC QN AUTO: 30.4 PG (ref 26.8–34.3)
MCHC RBC AUTO-ENTMCNC: 32.9 G/DL (ref 31.4–37.4)
MCV RBC AUTO: 93 FL (ref 82–98)
MONOCYTES # BLD AUTO: 0.34 THOUSAND/ΜL (ref 0.17–1.22)
MONOCYTES NFR BLD AUTO: 7 % (ref 4–12)
NEUTROPHILS # BLD AUTO: 3.96 THOUSANDS/ΜL (ref 1.85–7.62)
NEUTS SEG NFR BLD AUTO: 75 % (ref 43–75)
NRBC BLD AUTO-RTO: 0 /100 WBCS
PLATELET # BLD AUTO: 311 THOUSANDS/UL (ref 149–390)
PMV BLD AUTO: 11.2 FL (ref 8.9–12.7)
RBC # BLD AUTO: 4.5 MILLION/UL (ref 3.81–5.12)
WBC # BLD AUTO: 5.22 THOUSAND/UL (ref 4.31–10.16)

## 2018-10-08 PROCEDURE — 85025 COMPLETE CBC W/AUTO DIFF WBC: CPT

## 2018-10-08 PROCEDURE — 36415 COLL VENOUS BLD VENIPUNCTURE: CPT

## 2018-10-08 PROCEDURE — 99244 OFF/OP CNSLTJ NEW/EST MOD 40: CPT | Performed by: INTERNAL MEDICINE

## 2018-10-08 RX ORDER — VITAMIN E 268 MG
400 CAPSULE ORAL DAILY
COMMUNITY

## 2018-10-08 NOTE — PROGRESS NOTES
Hematology / Oncology Outpatient Consult Note    Alejandrina Eric 43 y o  female  [id number]         Date:  10/8/2018    Assessment / Plan:  A 74-year-old premenopausal woman with history of gastric bypass surgery in 2017, resulting in 110 lb weight loss  She was recently found to have mild leukopenia without anemia or thrombocytopenia  Unfortunately, we do not have differential on white cell  She is asymptomatic from hematology standpoint  Therefore, I have very low suspicion that she has significant primary bone marrow disease  I do not recommend bone marrow biopsy  Since we do not have recent differential, I recommended her to have CBC with differential   If I find alarming differential, I will contact her  I will see her again in 6 months with another CBC to ensure the stability  She is in agreement with my recommendation  Subjective:     HPI:  A 74-year-old premenopausal woman who was recently found to have mild leukopenia without anemia or thrombocytopenia  Therefore, she was referred to me to have further evaluation  She is asymptomatic from hematology standpoint  However, she recently developed sore throat without coughing or short of breath  She has history of gastric bypass surgery in 2017, resulting in 110 lb weight loss  She has no night sweats or any further weight loss  She does not have any other past medical history  The only prescription medication that she takes is fluoxetine  Her mother  of AML at age of 76 in 2017  She does not smoke nor drink alcohol  She has no absolutely no risk factors for HIV infection such as multiple sex partner or IV drug abuse  She has normal performance status  Interval History:          Objective:     Primary Diagnosis:    Mild leukopenia  Cancer Staging:  Cancer Staging  No matching staging information was found for the patient        Previous Hematologic/ Oncologic Treatment:         Current Hematologic/ Oncologic Treatment:      Observation  Disease Status:         Test Results:    Pathology:        Radiology:        Laboratory:        Physical Exam:      General Appearance:    Alert, oriented        Eyes:    PERRL   Ears:    Normal external ear canals, both ears   Nose:   Nares normal, septum midline   Throat:   Mucosa moist  Pharynx without injection  Neck:   Supple       Lungs:     Clear to auscultation bilaterally   Chest Wall:    No tenderness or deformity    Heart:    Regular rate and rhythm       Abdomen:     Soft, non-tender, bowel sounds +, no organomegaly           Extremities:   Extremities no cyanosis or edema       Skin:   no rash or icterus  Lymph nodes:   Cervical, supraclavicular, and axillary nodes normal   Neurologic:   CNII-XII intact, normal strength, sensation and reflexes     Throughout          Breast exam:   NA         ROS: Review of Systems   All other systems reviewed and are negative  Imaging: No results found  Labs:   Lab Results   Component Value Date    WBC 3 29 (L) 08/24/2018    HGB 12 3 08/24/2018    HCT 36 6 08/24/2018    MCV 93 08/24/2018     08/24/2018     Lab Results   Component Value Date     07/20/2018    K 3 8 07/20/2018     07/20/2018    CO2 28 07/20/2018    BUN 7 07/20/2018    CREATININE 0 55 (L) 07/20/2018    GLUF 80 07/20/2018    CALCIUM 8 8 07/20/2018    AST 17 07/20/2018    ALT 29 07/20/2018    ALKPHOS 97 07/20/2018    PROT 7 1 02/20/2017    BILITOT 0 6 02/20/2017    EGFR 119 07/20/2018         Lab Results   Component Value Date    IRON 98 07/20/2018    TIBC 287 07/20/2018    FERRITIN 29 07/20/2018       Lab Results   Component Value Date    WAJAEZOO51 3,294 (H) 07/20/2018       Lab Results   Component Value Date    FOLATE 17 7 (H) 07/20/2018           Vital Sign:    Body surface area is 1 46 meters squared      Wt Readings from Last 3 Encounters:   10/08/18 50 8 kg (112 lb)   08/21/18 52 2 kg (115 lb)   06/25/18 52 4 kg (115 lb 8 oz)        Temp Readings from Last 3 Encounters:   10/08/18 (!) 97 °F (36 1 °C) (Tympanic)   18 98 2 °F (36 8 °C)   17 98 2 °F (36 8 °C)        BP Readings from Last 3 Encounters:   10/08/18 120/86   18 124/76   18 132/80         Pulse Readings from Last 3 Encounters:   10/08/18 64   18 80   18 72     @LASTSAO2(3)@    Active Problems:   Patient Active Problem List   Diagnosis    Cardiac murmur    Depression    Herpes simplex infection    Low vitamin B12 level    Postsurgical malabsorption    Right ovarian cyst    [facility]par[facility] ([facility] [address] )    Bariatric surgery status    Leukopenia    Vitamin A deficiency       Past Medical History:   Past Medical History:   Diagnosis Date    Allergic rhinitis 2013    Anxiety     Cat scratch     On arms    Depression     Fatty liver     Herpes     Last outbreak was within the past year but she doesn't remember when    Hypertension     Mitral regurgitation     Murmur, cardiac     "slight" since childhood    Obesity     Pneumonia 2003    x1- double pneumonia and was hospitalized    Transaminitis     Tricuspid regurgitation        Surgical History:   Past Surgical History:   Procedure Laterality Date     SECTION      x1    ENDOMETRIAL ABLATION      ESOPHAGOGASTRODUODENOSCOPY N/A 2017    Procedure: ESOPHAGOGASTRODUODENOSCOPY (EGD); Surgeon: Macie Tompkins MD;  Location: [facility];  Service:     [place] EGD TRANSORAL BIOPSY SINGLE/MULTIPLE N/A 3/1/2017    Procedure: ESOPHAGOGASTRODUODENOSCOPY (EGD) with gastric bx;  Surgeon: Macie Tompkins MD;  Location: [facility];   Service: Bariatrics    [place] LAP GASTRIC BYPASS/EMILIA-EN-Y N/A 2017    Procedure: BYPASS GASTRIC  EMILIA-EN-Y LAPAROSCOPIC;  Surgeon: Macie Tompkins MD;  Location: [facility];  Service: Bariatrics    TUBAL LIGATION         Family History:    Family History   Problem Relation Age of Onset    Anxiety disorder Mother     Depression Mother     Leukemia Mother     Hypertension Father     Coronary artery disease Maternal Grandmother     Stroke Maternal Grandfather        Cancer-related family history is not on file  Social History:   Social History     Social History    Marital status: /Civil Union     Spouse name: N/A    Number of children: N/A    Years of education: N/A     Occupational History    Not on file  Social History Main Topics    Smoking status: Never Smoker    Smokeless tobacco: Never Used    Alcohol use Yes      Comment: social    Drug use: No    Sexual activity: Not on file     Other Topics Concern    Not on file     Social History Narrative    No secondhand smoke exposure       Current Medications:   Current Outpatient Prescriptions   Medication Sig Dispense Refill    Calcium Citrate-Vitamin D (Lili Hench D) 500-500 MG-UNT/5GM POWD Take by mouth      cholecalciferol (VITAMIN D3) 1,000 units tablet Take by mouth daily      FLUoxetine (PROzac) 20 mg capsule TAKE ONE CAPSULE BY MOUTH DAILY 90 capsule 1    fluticasone (FLONASE) 50 mcg/act nasal spray 2 sprays into each nostril daily      IRON PO Take by mouth      Iron-Vitamin C (IRON 100/C) 100-250 MG TABS Take by mouth      Multiple Vitamins-Minerals (BARIATRIC FUSION) CHEW Chew      Multiple Vitamins-Minerals (MULTIVITAMIN ADULT EXTRA C) CHEW Chew      valACYclovir (VALTREX) 500 mg tablet Take 1 tablet (500 mg total) by mouth daily 90 tablet 3    vitamin E, tocopherol, 400 units capsule Take 400 Units by mouth daily      Cobalamine Combinations (B-12) 1000-400 MCG SUBL Place under the tongue       No current facility-administered medications for this visit  Allergies:    Allergies   Allergen Reactions    Wellbutrin [Bupropion] Other (See Comments) and Hyperactivity     "felt like she could climb the wall"

## 2018-10-08 NOTE — LETTER
October 8, 2018     Pavan Velarde, [address]    Patient: Lynne Zhou   YOB: 1980   Date of Visit: 10/8/2018       Dear Dr Emma Kessler: Thank you for referring Gerald Garcia to me for evaluation  Below are my notes for this consultation  If you have questions, please do not hesitate to call me  I look forward to following your patient along with you  Sincerely,        Mathew Joy MD        CC: No Recipients  Mathew Joy MD  10/8/2018  1:24 PM  Sign at close encounter  Hematology / Oncology Outpatient Consult Note    Lynne Zhou 43 y o  female [id number]         Date:  10/8/2018    Assessment / Plan:  A 19-year-old premenopausal woman with history of gastric bypass surgery in 2017, resulting in 110 lb weight loss  She was recently found to have mild leukopenia without anemia or thrombocytopenia  Unfortunately, we do not have differential on white cell  She is asymptomatic from hematology standpoint  Therefore, I have very low suspicion that she has significant primary bone marrow disease  I do not recommend bone marrow biopsy  Since we do not have recent differential, I recommended her to have CBC with differential   If I find alarming differential, I will contact her  I will see her again in 6 months with another CBC to ensure the stability  She is in agreement with my recommendation  Subjective:     HPI:  A 19-year-old premenopausal woman who was recently found to have mild leukopenia without anemia or thrombocytopenia  Therefore, she was referred to me to have further evaluation  She is asymptomatic from hematology standpoint  However, she recently developed sore throat without coughing or short of breath  She has history of gastric bypass surgery in 2017, resulting in 110 lb weight loss  She has no night sweats or any further weight loss  She does not have any other past medical history    The only prescription medication that she takes is fluoxetine  Her mother  of AML at age of 76 in 2017  She does not smoke nor drink alcohol  She has normal performance status  Interval History:          Objective:     Primary Diagnosis:    Mild leukopenia  Cancer Staging:  Cancer Staging  No matching staging information was found for the patient  Previous Hematologic/ Oncologic Treatment:         Current Hematologic/ Oncologic Treatment:      Observation  Disease Status:         Test Results:    Pathology:        Radiology:        Laboratory:        Physical Exam:      General Appearance:    Alert, oriented        Eyes:    PERRL   Ears:    Normal external ear canals, both ears   Nose:   Nares normal, septum midline   Throat:   Mucosa moist  Pharynx without injection  Neck:   Supple       Lungs:     Clear to auscultation bilaterally   Chest Wall:    No tenderness or deformity    Heart:    Regular rate and rhythm       Abdomen:     Soft, non-tender, bowel sounds +, no organomegaly           Extremities:   Extremities no cyanosis or edema       Skin:   no rash or icterus  Lymph nodes:   Cervical, supraclavicular, and axillary nodes normal   Neurologic:   CNII-XII intact, normal strength, sensation and reflexes     Throughout          Breast exam:   NA         ROS: Review of Systems   All other systems reviewed and are negative  Imaging: No results found        Labs:   Lab Results   Component Value Date    WBC 3 29 (L) 2018    HGB 12 3 2018    HCT 36 6 2018    MCV 93 2018     2018     Lab Results   Component Value Date     2018    K 3 8 2018     2018    CO2 28 2018    BUN 7 2018    CREATININE 0 55 (L) 2018    GLUF 80 2018    CALCIUM 8 8 2018    AST 17 2018    ALT 29 2018    ALKPHOS 97 2018    PROT 7 1 2017    BILITOT 0 6 2017    EGFR 119 2018         Lab Results   Component Value Date    IRON 98 2018    TIBC 287 2018    FERRITIN 29 2018       Lab Results   Component Value Date    QUATYCMW15 3,294 (H) 2018       Lab Results   Component Value Date    FOLATE 17 7 (H) 2018           Vital Sign:    Body surface area is 1 46 meters squared  Wt Readings from Last 3 Encounters:   10/08/18 50 8 kg (112 lb)   18 52 2 kg (115 lb)   18 52 4 kg (115 lb 8 oz)        Temp Readings from Last 3 Encounters:   10/08/18 (!) 97 °F (36 1 °C) (Tympanic)   18 98 2 °F (36 8 °C)   17 98 2 °F (36 8 °C)        BP Readings from Last 3 Encounters:   10/08/18 120/86   18 124/76   18 132/80         Pulse Readings from Last 3 Encounters:   10/08/18 64   18 80   18 72     @LASTSAO2(3)@    Active Problems:   Patient Active Problem List   Diagnosis    Cardiac murmur    Depression    Herpes simplex infection    Low vitamin B12 level    Postsurgical malabsorption    Right ovarian cyst    Secondary hyperparathyroidism ([address] )    Bariatric surgery status    Leukopenia    Vitamin A deficiency       Past Medical History:   Past Medical History:   Diagnosis Date    Allergic rhinitis 2013    Anxiety     Cat scratch     On arms    Depression     Fatty liver     Herpes     Last outbreak was within the past year but she doesn't remember when    Hypertension     Mitral regurgitation     Murmur, cardiac     "slight" since childhood    Obesity     Pneumonia 2003    x1- double pneumonia and was hospitalized    Transaminitis     Tricuspid regurgitation        Surgical History:   Past Surgical History:   Procedure Laterality Date     SECTION      x1    ENDOMETRIAL ABLATION      ESOPHAGOGASTRODUODENOSCOPY N/A 2017    Procedure: ESOPHAGOGASTRODUODENOSCOPY (EGD);   Surgeon: Radha Miranda MD;  Location: [facility];  Service:     MT EGD TRANSORAL BIOPSY SINGLE/MULTIPLE N/A 3/1/2017    Procedure: ESOPHAGOGASTRODUODENOSCOPY (EGD) with gastric bx;  Surgeon: Lynette Rahman MD;  Location: [facility]; Service: Bariatrics    NV LAP GASTRIC BYPASS/EMILIA-EN-Y N/A 5/30/2017    Procedure: BYPASS GASTRIC  EMILIA-EN-Y LAPAROSCOPIC;  Surgeon: Lynette Rahman MD;  Location: [facility];  Service: Bariatrics    TUBAL LIGATION         Family History:    Family History   Problem Relation Age of Onset    Anxiety disorder Mother     Depression Mother     Leukemia Mother     Hypertension Father     Coronary artery disease Maternal Grandmother     Stroke Maternal Grandfather        Cancer-related family history is not on file  Social History:   Social History     Social History    Marital status: /Civil Union     Spouse name: N/A    Number of children: N/A    Years of education: N/A     Occupational History    Not on file       Social History Main Topics    Smoking status: Never Smoker    Smokeless tobacco: Never Used    Alcohol use Yes      Comment: social    Drug use: No    Sexual activity: Not on file     Other Topics Concern    Not on file     Social History Narrative    No secondhand smoke exposure       Current Medications:   Current Outpatient Prescriptions   Medication Sig Dispense Refill    Calcium Citrate-Vitamin D (Lexi Lincoln D) 500-500 MG-UNT/5GM POWD Take by mouth      cholecalciferol (VITAMIN D3) 1,000 units tablet Take by mouth daily      FLUoxetine (PROzac) 20 mg capsule TAKE ONE CAPSULE BY MOUTH DAILY 90 capsule 1    fluticasone (FLONASE) 50 mcg/act nasal spray 2 sprays into each nostril daily      IRON PO Take by mouth      Iron-Vitamin C (IRON 100/C) 100-250 MG TABS Take by mouth      Multiple Vitamins-Minerals (BARIATRIC FUSION) CHEW Chew      Multiple Vitamins-Minerals (MULTIVITAMIN ADULT EXTRA C) CHEW Chew      valACYclovir (VALTREX) 500 mg tablet Take 1 tablet (500 mg total) by mouth daily 90 tablet 3    vitamin E, tocopherol, 400 units capsule Take 400 Units by mouth daily  Cobalamine Combinations (B-12) 1000-400 MCG SUBL Place under the tongue       No current facility-administered medications for this visit  Allergies:    Allergies   Allergen Reactions    Wellbutrin [Bupropion] Other (See Comments) and Hyperactivity     "felt like she could climb the wall"

## 2018-10-09 ENCOUNTER — APPOINTMENT (EMERGENCY)
Dept: CT IMAGING | Facility: HOSPITAL | Age: 38
End: 2018-10-09
Payer: COMMERCIAL

## 2018-10-09 ENCOUNTER — OFFICE VISIT (OUTPATIENT)
Dept: FAMILY MEDICINE CLINIC | Facility: CLINIC | Age: 38
End: 2018-10-09
Payer: COMMERCIAL

## 2018-10-09 ENCOUNTER — HOSPITAL ENCOUNTER (EMERGENCY)
Facility: HOSPITAL | Age: 38
Discharge: HOME | End: 2018-10-09
Attending: EMERGENCY MEDICINE | Admitting: EMERGENCY MEDICINE
Payer: COMMERCIAL

## 2018-10-09 ENCOUNTER — TELEPHONE (OUTPATIENT)
Dept: HEMATOLOGY ONCOLOGY | Facility: CLINIC | Age: 38
End: 2018-10-09

## 2018-10-09 VITALS
SYSTOLIC BLOOD PRESSURE: 120 MMHG | TEMPERATURE: 98.5 F | WEIGHT: 113.4 LBS | HEART RATE: 80 BPM | BODY MASS INDEX: 21.41 KG/M2 | HEIGHT: 61 IN | DIASTOLIC BLOOD PRESSURE: 76 MMHG

## 2018-10-09 VITALS
WEIGHT: 113 LBS | HEART RATE: 67 BPM | DIASTOLIC BLOOD PRESSURE: 86 MMHG | TEMPERATURE: 98.6 F | RESPIRATION RATE: 16 BRPM | BODY MASS INDEX: 21.71 KG/M2 | SYSTOLIC BLOOD PRESSURE: 152 MMHG | OXYGEN SATURATION: 100 %

## 2018-10-09 DIAGNOSIS — M54.50 ACUTE LEFT-SIDED LOW BACK PAIN WITHOUT SCIATICA: ICD-10-CM

## 2018-10-09 DIAGNOSIS — Z98.84 BARIATRIC SURGERY STATUS: ICD-10-CM

## 2018-10-09 DIAGNOSIS — K59.00 CONSTIPATION, UNSPECIFIED CONSTIPATION TYPE: ICD-10-CM

## 2018-10-09 DIAGNOSIS — M62.830 MUSCLE SPASM OF BACK: ICD-10-CM

## 2018-10-09 DIAGNOSIS — S39.012A LOW BACK STRAIN: Primary | ICD-10-CM

## 2018-10-09 DIAGNOSIS — J30.9 ALLERGIC RHINITIS, UNSPECIFIED SEASONALITY, UNSPECIFIED TRIGGER: ICD-10-CM

## 2018-10-09 DIAGNOSIS — R10.32 LEFT LOWER QUADRANT PAIN: Primary | ICD-10-CM

## 2018-10-09 DIAGNOSIS — S39.011A ABDOMINAL MUSCLE STRAIN: ICD-10-CM

## 2018-10-09 LAB
ALBUMIN SERPL BCP-MCNC: 4.5 G/DL (ref 3.5–5)
ALP SERPL-CCNC: 114 U/L (ref 46–116)
ALT SERPL W P-5'-P-CCNC: 30 U/L (ref 12–78)
ANION GAP SERPL CALCULATED.3IONS-SCNC: 7 MMOL/L (ref 4–13)
AST SERPL W P-5'-P-CCNC: 20 U/L (ref 5–45)
BASOPHILS # BLD AUTO: 0.04 THOUSANDS/ΜL (ref 0–0.1)
BASOPHILS NFR BLD AUTO: 1 % (ref 0–1)
BILIRUB SERPL-MCNC: 0.79 MG/DL (ref 0.2–1)
BILIRUB UR QL STRIP: NEGATIVE
BUN SERPL-MCNC: 6 MG/DL (ref 5–25)
CALCIUM SERPL-MCNC: 9.4 MG/DL (ref 8.3–10.1)
CHLORIDE SERPL-SCNC: 101 MMOL/L (ref 100–108)
CLARITY UR: NORMAL
CO2 SERPL-SCNC: 31 MMOL/L (ref 21–32)
COLOR UR: YELLOW
CREAT SERPL-MCNC: 0.59 MG/DL (ref 0.6–1.3)
EOSINOPHIL # BLD AUTO: 0.12 THOUSAND/ΜL (ref 0–0.61)
EOSINOPHIL NFR BLD AUTO: 3 % (ref 0–6)
ERYTHROCYTE [DISTWIDTH] IN BLOOD BY AUTOMATED COUNT: 11.4 % (ref 11.6–15.1)
GFR SERPL CREATININE-BSD FRML MDRD: 116 ML/MIN/1.73SQ M
GLUCOSE SERPL-MCNC: 86 MG/DL (ref 65–140)
GLUCOSE UR STRIP-MCNC: NEGATIVE MG/DL
HCT VFR BLD AUTO: 39.9 % (ref 34.8–46.1)
HGB BLD-MCNC: 13.6 G/DL (ref 11.5–15.4)
HGB UR QL STRIP.AUTO: NEGATIVE
IMM GRANULOCYTES # BLD AUTO: 0.01 THOUSAND/UL (ref 0–0.2)
IMM GRANULOCYTES NFR BLD AUTO: 0 % (ref 0–2)
KETONES UR STRIP-MCNC: NEGATIVE MG/DL
LEUKOCYTE ESTERASE UR QL STRIP: NEGATIVE
LIPASE SERPL-CCNC: 120 U/L (ref 73–393)
LYMPHOCYTES # BLD AUTO: 1.39 THOUSANDS/ΜL (ref 0.6–4.47)
LYMPHOCYTES NFR BLD AUTO: 32 % (ref 14–44)
MCH RBC QN AUTO: 31.1 PG (ref 26.8–34.3)
MCHC RBC AUTO-ENTMCNC: 34.1 G/DL (ref 31.4–37.4)
MCV RBC AUTO: 91 FL (ref 82–98)
MONOCYTES # BLD AUTO: 0.27 THOUSAND/ΜL (ref 0.17–1.22)
MONOCYTES NFR BLD AUTO: 6 % (ref 4–12)
NEUTROPHILS # BLD AUTO: 2.47 THOUSANDS/ΜL (ref 1.85–7.62)
NEUTS SEG NFR BLD AUTO: 58 % (ref 43–75)
NITRITE UR QL STRIP: NEGATIVE
NRBC BLD AUTO-RTO: 0 /100 WBCS
PH UR STRIP.AUTO: 7 [PH] (ref 4.5–8)
PLATELET # BLD AUTO: 295 THOUSANDS/UL (ref 149–390)
PMV BLD AUTO: 11 FL (ref 8.9–12.7)
POTASSIUM SERPL-SCNC: 3.7 MMOL/L (ref 3.5–5.3)
PROT SERPL-MCNC: 8 G/DL (ref 6.4–8.2)
PROT UR STRIP-MCNC: NEGATIVE MG/DL
RBC # BLD AUTO: 4.38 MILLION/UL (ref 3.81–5.12)
SODIUM SERPL-SCNC: 139 MMOL/L (ref 136–145)
SP GR UR STRIP.AUTO: 1.01 (ref 1–1.03)
UROBILINOGEN UR QL STRIP.AUTO: 0.2 E.U./DL
WBC # BLD AUTO: 4.3 THOUSAND/UL (ref 4.31–10.16)

## 2018-10-09 PROCEDURE — 99214 OFFICE O/P EST MOD 30 MIN: CPT | Performed by: FAMILY MEDICINE

## 2018-10-09 PROCEDURE — 96374 THER/PROPH/DIAG INJ IV PUSH: CPT

## 2018-10-09 PROCEDURE — 80053 COMPREHEN METABOLIC PANEL: CPT | Performed by: EMERGENCY MEDICINE

## 2018-10-09 PROCEDURE — 1036F TOBACCO NON-USER: CPT | Performed by: FAMILY MEDICINE

## 2018-10-09 PROCEDURE — 99284 EMERGENCY DEPT VISIT MOD MDM: CPT

## 2018-10-09 PROCEDURE — 74177 CT ABD & PELVIS W/CONTRAST: CPT

## 2018-10-09 PROCEDURE — 85025 COMPLETE CBC W/AUTO DIFF WBC: CPT | Performed by: EMERGENCY MEDICINE

## 2018-10-09 PROCEDURE — 83690 ASSAY OF LIPASE: CPT | Performed by: EMERGENCY MEDICINE

## 2018-10-09 PROCEDURE — 81003 URINALYSIS AUTO W/O SCOPE: CPT

## 2018-10-09 PROCEDURE — 36415 COLL VENOUS BLD VENIPUNCTURE: CPT | Performed by: EMERGENCY MEDICINE

## 2018-10-09 PROCEDURE — 96361 HYDRATE IV INFUSION ADD-ON: CPT

## 2018-10-09 RX ORDER — MORPHINE SULFATE 4 MG/ML
4 INJECTION, SOLUTION INTRAMUSCULAR; INTRAVENOUS ONCE
Status: COMPLETED | OUTPATIENT
Start: 2018-10-09 | End: 2018-10-09

## 2018-10-09 RX ORDER — IPRATROPIUM BROMIDE 21 UG/1
2 SPRAY, METERED NASAL 3 TIMES DAILY
Qty: 30 ML | Refills: 4 | Status: SHIPPED | OUTPATIENT
Start: 2018-10-09 | End: 2019-02-18

## 2018-10-09 RX ORDER — CYCLOBENZAPRINE HCL 10 MG
10 TABLET ORAL 2 TIMES DAILY PRN
Qty: 20 TABLET | Refills: 0 | Status: SHIPPED | OUTPATIENT
Start: 2018-10-09 | End: 2018-12-05

## 2018-10-09 RX ADMIN — MORPHINE SULFATE 4 MG: 4 INJECTION, SOLUTION INTRAMUSCULAR; INTRAVENOUS at 11:33

## 2018-10-09 RX ADMIN — SODIUM CHLORIDE 1000 ML: 0.9 INJECTION, SOLUTION INTRAVENOUS at 11:33

## 2018-10-09 RX ADMIN — IOHEXOL 50 ML: 240 INJECTION, SOLUTION INTRATHECAL; INTRAVASCULAR; INTRAVENOUS; ORAL at 13:12

## 2018-10-09 RX ADMIN — IOHEXOL 100 ML: 350 INJECTION, SOLUTION INTRAVENOUS at 13:12

## 2018-10-09 NOTE — TELEPHONE ENCOUNTER
----- Message from Dillan Simms MD sent at 10/9/2018 10:00 AM EDT -----  Call her   Her WBC and diff was normal

## 2018-10-09 NOTE — DISCHARGE INSTRUCTIONS
Low Back Strain, Ambulatory Care   GENERAL INFORMATION:   Low back strain  is an injury to your lower back muscles or tendons  Tendons are strong tissues that connect muscles to bones  The lower back supports most of your body weight and helps you move, twist, and bend  Low back strain is usually caused by activities that increase stress on the lower back, such as exercise or injury  Common symptoms include the following:   · Low back pain or muscle spasms    · Stiffness or limited movement    · Pain that goes down to the buttocks, groin, or legs    · Pain that is worse with activity  Seek immediate care for the following symptoms:   · A pop in your lower back    · Increased swelling or pain in your lower back    · Trouble moving your legs    · Numbness in your legs  Treatment for low back strain:   · NSAIDs  help decrease swelling and pain or fever  This medicine is available with or without a doctor's order  NSAIDs can cause stomach bleeding or kidney problems in certain people  If you take blood thinner medicine, always ask your healthcare provider if NSAIDs are safe for you  Always read the medicine label and follow directions  · Muscle relaxers  help decrease muscle spasms pain  · Prescription pain medicine  may be given  Ask how to take this medicine safely  Manage your symptoms:   · Rest  in bed after your injury  Slowly start to increase your activity as the pain decreases, or as directed  · Apply ice  on your lower back for 15 to 20 minutes every hour or as directed  Use an ice pack, or put crushed ice in a plastic bag  Cover it with a towel  Ice helps prevent tissue damage and decreases swelling and pain  You can alternate ice and heat  · Apply heat  on your lower back for 20 to 30 minutes every 2 hours for as many days as directed  Heat helps decrease pain and muscle spasms  Prevent another low back strain:   · Use proper body mechanics        ¨ Bend at the hips and knees when you  objects  Do not bend from the waist  Use your leg muscles as you lift the load  Do not use your back  Keep the object close to your chest as you lift it  Try not to twist or lift anything above your waist     ¨ Change your position often when you stand for long periods of time  Rest one foot on a small box or footrest, and then switch to the other foot often  ¨ Try not to sit for long periods of time  When you do, sit in a straight-backed chair with your feet flat on the floor  Never reach, pull, or push while you are sitting  · Exercise regularly  Warm up before you exercise  Do exercises that strengthen your back muscles  Ask about the best exercise plan for you  · Maintain a healthy weight  Ask your healthcare provider how much you should weigh  Ask him to help you create a weight loss plan if you are overweight  Follow up with your healthcare provider as directed:  Write down your questions so you remember to ask them during your visits  CARE AGREEMENT:   You have the right to help plan your care  Learn about your health condition and how it may be treated  Discuss treatment options with your caregivers to decide what care you want to receive  You always have the right to refuse treatment  The above information is an  only  It is not intended as medical advice for individual conditions or treatments  Talk to your doctor, nurse or pharmacist before following any medical regimen to see if it is safe and effective for you  © 2014 [address] is for End User's use only and may not be sold, redistributed or otherwise used for commercial purposes  All illustrations and images included in CareNotes® are the copyrighted property of [employer] , [employer]  or [employer] Ch[employer]  Abdominal Pain   WHAT YOU NEED TO KNOW:   Abdominal pain can be dull, achy, or sharp  You may have pain in one area of your abdomen, or in your entire abdomen   Your pain may be caused by a condition such as constipation, food sensitivity or poisoning, infection, or a blockage  Abdominal pain can also be from a hernia, appendicitis, or an ulcer  Liver, gallbladder, or kidney conditions can also cause abdominal pain  The cause of your abdominal pain may be unknown  DISCHARGE INSTRUCTIONS:   Return to the emergency department if:   · You have new chest pain or shortness of breath  · You have pulsing pain in your upper abdomen or lower back that suddenly becomes constant  · Your pain is in the right lower abdominal area and worsens with movement  · You have a fever over 100 4°F (38°C) or shaking chills  · You are vomiting and cannot keep food or liquids down  · Your pain does not improve or gets worse over the next 8 to 12 hours  · You see blood in your vomit or bowel movements, or they look black and tarry  · Your skin or the whites of your eyes turn yellow  · You are a woman and have a large amount of vaginal bleeding that is not your monthly period  Contact your healthcare provider if:   · You have pain in your lower back  · You are a man and have pain in your testicles  · You have pain when you urinate  · You have questions or concerns about your condition or care  Follow up with your healthcare provider within 24 hours or as directed:  Write down your questions so you remember to ask them during your visits  Medicines:   · Medicines  may be given to calm your stomach and prevent vomiting or to decrease pain  Ask how to take pain medicine safely  · Take your medicine as directed  Contact your healthcare provider if you think your medicine is not helping or if you have side effects  Tell him of her if you are allergic to any medicine  Keep a list of the medicines, vitamins, and herbs you take  Include the amounts, and when and why you take them  Bring the list or the pill bottles to follow-up visits   Carry your medicine list with you in case of an emergency  © 2017 [facility] Information is for End User's use only and may not be sold, redistributed or otherwise used for commercial purposes  All illustrations and images included in CareNotes® are the copyrighted property of [employer]  or [employer]  The above information is an  only  It is not intended as medical advice for individual conditions or treatments  Talk to your doctor, nurse or pharmacist before following any medical regimen to see if it is safe and effective for you

## 2018-10-09 NOTE — PROGRESS NOTES
Assessment/Plan:  1  Abdominal pain left-sided both left upper and left lower/constipation  Question etiology patient is status post gastric bypass  2  Low back pain 3  Mild spasm unsure if this is a separate issue or secondary to above  4  Allergic rhinitis will prescribe Atrovent nasal spray  5  In light of issues above patient has acute abdominal pain and constipation status post bariatric surgery must rule out obstruction [facility] for stat evaluation and treatment          No problem-specific Assessment & Plan notes found for this encounter  Diagnoses and all orders for this visit:    Left lower quadrant pain  -     Ambulatory Referral to Emergency Medicine; Future    Bariatric surgery status  -     Ambulatory Referral to Emergency Medicine; Future    Acute left-sided low back pain without sciatica  -     Ambulatory Referral to Emergency Medicine; Future    Allergic rhinitis, unspecified seasonality, unspecified trigger  -     ipratropium (ATROVENT) 0 03 % nasal spray; 2 sprays into each nostril 3 (three) times a day    Muscle spasm of back  -     Ambulatory Referral to Emergency Medicine; Future    Constipation, unspecified constipation type          Subjective: c/o back and abdominal pain unrelieved by tyleon, advil or tylenol with codeine  Onset Sunday  Denies problem with urination  Pt did  a 100 lb  Item the day before pain stated  Last BM was Sunday AM   She also had cold symptoms prior to pain  --bb     Patient ID: Marge Reed is a 43 y o  female  Sunday patient started back pain now rating anterior to mainly left-sided abdominal pain  Patient did relate to lifting 100 lb object the day before  Patient denies nausea vomiting patient is constipated last bowel movement was at least 48 hours ago  No fever chills  Patient does have head congestion sneezing clear rhinorrhea    No chest pain shortness breath cough or wheeze        The following portions of the patient's history were reviewed and updated as appropriate: allergies, current medications, past family history, past medical history, past social history, past surgical history and problem list     Review of Systems   Constitutional:        HPI   HENT:        HPI   Eyes: Negative  Respiratory: Negative  Cardiovascular: Negative  Gastrointestinal:        HPI   Endocrine: Negative  Genitourinary: Negative  Musculoskeletal:        HPI   Skin: Negative  Allergic/Immunologic: Positive for environmental allergies  Neurological: Negative  Hematological: Negative  Psychiatric/Behavioral: Negative  Objective:      /76 (BP Location: Left arm, Patient Position: Sitting, Cuff Size: Standard)   Pulse 80   Temp 98 5 °F (36 9 °C) (Tympanic)   Ht 5' 0 5" (1 537 m)   Wt 51 4 kg (113 lb 6 4 oz)   BMI 21 78 kg/m²          Physical Exam   Constitutional: She is oriented to person, place, and time  She appears well-developed and well-nourished  HENT:   Head: Normocephalic and atraumatic  Right Ear: External ear normal    Left Ear: External ear normal    Mouth/Throat: No oropharyngeal exudate  Positive allergic turbinates negative sinus tenderness to percussion positive scant clear postnasal drip negative pharyngeal injection or exudate   Eyes: Pupils are equal, round, and reactive to light  Conjunctivae and EOM are normal  No scleral icterus  Neck: Neck supple  No thyromegaly present  Cardiovascular: Normal rate, regular rhythm and normal heart sounds  Pulmonary/Chest: Effort normal and breath sounds normal    Abdominal: She exhibits no distension and no mass  There is tenderness  There is guarding  There is no rebound  Patient has some left-sided abdominal tenderness positive voluntary guarding negative rebound rigidity   Musculoskeletal: She exhibits tenderness  She exhibits no edema or deformity     Mild paravertebral muscle contraction lumbar sacral area positive tender left dismissed dressing musculature negative gross deformity   Lymphadenopathy:     She has no cervical adenopathy  Neurological: She is alert and oriented to person, place, and time  She has normal reflexes  No cranial nerve deficit  Straight leg raising 90° bilateral   Skin: Skin is warm and dry  No rash noted  Psychiatric: She has a normal mood and affect

## 2018-10-09 NOTE — ED PROVIDER NOTES
History  Chief Complaint   Patient presents with    Abdominal Pain     abdominal and b/l flank pain x 2 days  Seen at pcp today and sent here for further eval due to tenderness upon palpation  Hx of gastric bypass in 5/2017  HPI    21-year-old female presents for abdominal pain and back pain flank pain  Patient reports that starting two days ago aching in nature  Left-sided mostly but radiating to the right  Feels like she had low back pain after lifting of heavy box  But does not recall an injury  She went to her family doctor today who stated she should go to the emergency department because of her history of gastric bypass and slight constipation over the past two days  Patient denies dysuria fevers chills vaginal bleeding vaginal discharge vomiting  No caudal symptoms  She reports following her bariatric surgeons instructions exactly and has had no issues with her bypass  She has no other associated symptoms are no other modifying factors symptoms were constant since onset but somewhat improving  Exam reveals a tender abdomen in the lower abdomen but no rebound no flank tenderness neurovascularly intact     Patient able to walk without difficulty  assessment plan abdominal pain flank pain  Check urine for urinary tract infection labs for metabolic derangement  CT bariatric protocol to evaluate for obstruction patient does not have a significant exam so likelihood is low however given the history of bariatric surgery most rule this out  Pain control follow up with PCP if all negativ, patient could have just had an abdominal strain and lumbar spine strain due to lifting a box of steer meat  Prior to Admission Medications   Prescriptions Last Dose Informant Patient Reported? Taking?    Calcium Citrate-Vitamin D (UPCAL D) 500-500 MG-UNT/5GM POWD   Yes Yes   Sig: Take by mouth   IRON PO   Yes Yes   Sig: Take by mouth   Iron-Vitamin C (IRON 100/C) 100-250 MG TABS   Yes Yes   Sig: Take by mouth Multiple Vitamins-Minerals (BARIATRIC FUSION) CHEW   Yes Yes   Sig: Chew   cholecalciferol (VITAMIN D3) 1,000 units tablet  Self Yes Yes   Sig: Take by mouth daily   fluticasone (FLONASE) 50 mcg/act nasal spray   Yes Yes   Si sprays into each nostril daily   ipratropium (ATROVENT) 0 03 % nasal spray   No Yes   Si sprays into each nostril 3 (three) times a day   valACYclovir (VALTREX) 500 mg tablet   No Yes   Sig: Take 1 tablet (500 mg total) by mouth daily   vitamin E, tocopherol, 400 units capsule  Self Yes Yes   Sig: Take 400 Units by mouth daily      Facility-Administered Medications: None       Past Medical History:   Diagnosis Date    Allergic rhinitis 2013    Anxiety     Cat scratch     On arms    Depression     Fatty liver     Herpes     Last outbreak was within the past year but she doesn't remember when    Hypertension     Mitral regurgitation     Murmur, cardiac     "slight" since childhood    Obesity     Ovarian cyst     Pneumonia 2003    x1- double pneumonia and was hospitalized    Transaminitis     Tricuspid regurgitation        Past Surgical History:   Procedure Laterality Date     SECTION      x1    ENDOMETRIAL ABLATION      ESOPHAGOGASTRODUODENOSCOPY N/A 2017    Procedure: ESOPHAGOGASTRODUODENOSCOPY (EGD); Surgeon: Petra Carrizales MD;  [facility];  Service:     MA EGD TRANSORAL BIOPSY SINGLE/MULTIPLE N/A 3/1/2017    Procedure: ESOPHAGOGASTRODUODENOSCOPY (EGD) with gastric bx;  Surgeon: Petra Carrizales MD;  [facility];   Service: Bariatrics    MA LAP GASTRIC BYPASS/EMILIA-EN-Y N/A 2017    Procedure: BYPASS GASTRIC  EMILIA-EN-Y LAPAROSCOPIC;  Surgeon: Petra Carrizales MD;  [facility];  Service: Bariatrics    TUBAL LIGATION         Family History   Problem Relation Age of Onset    Anxiety disorder Mother     Depression Mother     Leukemia Mother     Hypertension Father     Coronary artery disease Maternal Grandmother     Stroke Maternal Grandfather      I have reviewed and agree with the history as documented  Social History   Substance Use Topics    Smoking status: Never Smoker    Smokeless tobacco: Never Used    Alcohol use Yes      Comment: social        Review of Systems   Constitutional: Negative for chills, fatigue and fever  Eyes: Negative for photophobia and visual disturbance  Respiratory: Negative for cough and shortness of breath  Cardiovascular: Negative for chest pain, palpitations and leg swelling  Gastrointestinal: Negative for diarrhea, nausea and vomiting  Endocrine: Negative for polydipsia and polyuria  Genitourinary: Negative for decreased urine volume, difficulty urinating, dysuria and frequency  Musculoskeletal: Negative for back pain, neck pain and neck stiffness  Skin: Negative for color change and rash  Allergic/Immunologic: Negative for environmental allergies and immunocompromised state  Neurological: Negative for dizziness and headaches  Hematological: Negative for adenopathy  Does not bruise/bleed easily  Psychiatric/Behavioral: Negative for dysphoric mood  The patient is not nervous/anxious  Physical Exam  Physical Exam   Constitutional: She is oriented to person, place, and time  She appears well-developed and well-nourished  No distress  HENT:   Head: Normocephalic and atraumatic  Nose: Nose normal    Eyes: Pupils are equal, round, and reactive to light  Conjunctivae and EOM are normal  No scleral icterus  Neck: Normal range of motion  Neck supple  No JVD present  No tracheal deviation present  No thyromegaly present  Cardiovascular: Normal rate, regular rhythm, normal heart sounds and intact distal pulses  Exam reveals no gallop and no friction rub  Pulmonary/Chest: Effort normal and breath sounds normal  No respiratory distress  She has no wheezes  She has no rales  She exhibits no tenderness  Abdominal: Soft   Bowel sounds are normal  She exhibits no distension and no mass  There is no tenderness  There is no rebound and no guarding  No hernia  Musculoskeletal: Normal range of motion  She exhibits no edema, tenderness or deformity  Neurological: She is alert and oriented to person, place, and time  She has normal reflexes  No cranial nerve deficit  Coordination normal    Skin: Skin is warm and dry  She is not diaphoretic  No erythema  Psychiatric: She has a normal mood and affect  Her behavior is normal    Nursing note and vitals reviewed        Vital Signs  ED Triage Vitals   Temperature Pulse Respirations Blood Pressure SpO2   10/09/18 1049 10/09/18 1049 10/09/18 1049 10/09/18 1049 10/09/18 1049   98 6 °F (37 °C) 77 16 158/92 99 %      Temp Source Heart Rate Source Patient Position - Orthostatic VS BP Location FiO2 (%)   10/09/18 1049 10/09/18 1136 10/09/18 1136 10/09/18 1136 --   Oral Monitor Lying Right arm       Pain Score       10/09/18 1049       4           Vitals:    10/09/18 1049 10/09/18 1136 10/09/18 1313   BP: 158/92 156/86 152/86   Pulse: 77 68 67   Patient Position - Orthostatic VS:  Lying Lying       Visual Acuity      ED Medications  Medications   sodium chloride 0 9 % bolus 1,000 mL (0 mL Intravenous Stopped 10/9/18 1233)   morphine (PF) 4 mg/mL injection 4 mg (4 mg Intravenous Given 10/9/18 1133)   iohexol (OMNIPAQUE) 350 MG/ML injection (MULTI-DOSE) 100 mL (100 mL Intravenous Given 10/9/18 1312)   iohexol (OMNIPAQUE) 240 MG/ML solution 50 mL (50 mL Oral Given 10/9/18 1312)       Diagnostic Studies  Results Reviewed     Procedure Component Value Units Date/Time    Comprehensive metabolic panel [[id number]]  (Abnormal) Collected:  10/09/18 1129    Lab Status:  Final result Specimen:  Blood from Arm, Left Updated:  10/09/18 1219     Sodium 139 mmol/L      Potassium 3 7 mmol/L      Chloride 101 mmol/L      CO2 31 mmol/L      ANION GAP 7 mmol/L      BUN 6 mg/dL      Creatinine 0 59 (L) mg/dL      Glucose 86 mg/dL      Calcium 9 4 mg/dL AST 20 U/L      ALT 30 U/L      Alkaline Phosphatase 114 U/L      Total Protein 8 0 g/dL      Albumin 4 5 g/dL      Total Bilirubin 0 79 mg/dL      eGFR 116 ml/min/1 73sq m     Narrative:         National Kidney Disease Education Program recommendations are as follows:  GFR calculation is accurate only with a steady state creatinine  Chronic Kidney disease less than 60 ml/min/1 73 sq  meters  Kidney failure less than 15 ml/min/1 73 sq  meters      Lipase [[id number]]  (Normal) Collected:  10/09/18 1129    Lab Status:  Final result Specimen:  Blood from Arm, Left Updated:  10/09/18 1219     Lipase 120 u/L     CBC and differential [[id number]]  (Abnormal) Collected:  10/09/18 1129    Lab Status:  Final result Specimen:  Blood from Arm, Left Updated:  10/09/18 1158     WBC 4 30 (L) Thousand/uL      RBC 4 38 Million/uL      Hemoglobin 13 6 g/dL      Hematocrit 39 9 %      MCV 91 fL      MCH 31 1 pg      MCHC 34 1 g/dL      RDW 11 4 (L) %      MPV 11 0 fL      Platelets 070 Thousands/uL      nRBC 0 /100 WBCs      Neutrophils Relative 58 %      Immat GRANS % 0 %      Lymphocytes Relative 32 %      Monocytes Relative 6 %      Eosinophils Relative 3 %      Basophils Relative 1 %      Neutrophils Absolute 2 47 Thousands/µL      Immature Grans Absolute 0 01 Thousand/uL      Lymphocytes Absolute 1 39 Thousands/µL      Monocytes Absolute 0 27 Thousand/µL      Eosinophils Absolute 0 12 Thousand/µL      Basophils Absolute 0 04 Thousands/µL     ED Urine Macroscopic [[id number]] Collected:  10/09/18 1112    Lab Status:  Final result Specimen:  Urine Updated:  10/09/18 1101     Color, UA Yellow     Clarity, UA Slightly Cloudy     pH, UA 7 0     Leukocytes, UA Negative     Nitrite, UA Negative     Protein, UA Negative mg/dl      Glucose, UA Negative mg/dl      Ketones, UA Negative mg/dl      Urobilinogen, UA 0 2 E U /dl      Bilirubin, UA Negative     Blood, UA Negative     Specific Gravity, UA 1 010    Narrative:       [facility] RESULT CT abdomen pelvis w contrast   Final Result by Aguila Conde MD (10/09 6598)      No acute pathology  Enlarging right adnexal dermoid cyst       Workstation performed: [id number]                    Procedures  Procedures       Phone Contacts  ED Phone Contact    ED Course                               MDM  Number of Diagnoses or Management Options  Abdominal muscle strain: new and requires workup  Low back strain: new and requires workup  Diagnosis management comments: CT shows no evidence of obstruction  No abnormalities  Patient will take muscle relaxants and Tylenol for pain       Amount and/or Complexity of Data Reviewed  Clinical lab tests: reviewed and ordered  Tests in the radiology section of CPT®: reviewed and ordered  Tests in the medicine section of CPT®: ordered and reviewed  Independent visualization of images, tracings, or specimens: yes    Patient Progress  Patient progress: stable    CritCare Time    Disposition  Final diagnoses:   Low back strain   Abdominal muscle strain     Time reflects when diagnosis was documented in both MDM as applicable and the Disposition within this note     Time User Action Codes Description Comment    10/9/2018  2:07 PM Yessi Waldron Add [G48 098N] Low back strain     10/9/2018  2:07 PM Yessi Waldron Add [S39 011A] Abdominal muscle strain       ED Disposition     ED Disposition Condition Comment    Discharge  Cleda Res discharge to home/self care      Condition at discharge: Good        Follow-up Information    None         Discharge Medication List as of 10/9/2018  2:08 PM      START taking these medications    Details   cyclobenzaprine (FLEXERIL) 10 mg tablet Take 1 tablet (10 mg total) by mouth 2 (two) times a day as needed for muscle spasms, Starting Tue 10/9/2018, Normal         CONTINUE these medications which have NOT CHANGED    Details   Calcium Citrate-Vitamin D (UPCAL D) 500-500 MG-UNT/5GM POWD Take by mouth, Historical Med cholecalciferol (VITAMIN D3) 1,000 units tablet Take by mouth daily, Historical Med      fluticasone (FLONASE) 50 mcg/act nasal spray 2 sprays into each nostril daily, Starting Mon 8/21/2017, Historical Med      ipratropium (ATROVENT) 0 03 % nasal spray 2 sprays into each nostril 3 (three) times a day, Starting Tue 10/9/2018, Normal      IRON PO Take by mouth, Historical Med      Iron-Vitamin C (IRON 100/C) 100-250 MG TABS Take by mouth, Historical Med      Multiple Vitamins-Minerals (BARIATRIC FUSION) CHEW Chew, Historical Med      valACYclovir (VALTREX) 500 mg tablet Take 1 tablet (500 mg total) by mouth daily, Starting Thu 3/8/2018, Until Fri 3/8/2019, Normal      vitamin E, tocopherol, 400 units capsule Take 400 Units by mouth daily, Historical Med      FLUoxetine (PROzac) 20 mg capsule TAKE ONE CAPSULE BY MOUTH DAILY, Normal           No discharge procedures on file      ED Provider  Electronically Signed by           Rubio Henriquez DO  10/10/18 1787

## 2018-10-09 NOTE — PATIENT INSTRUCTIONS
Patient to go to [facility] today for evaluation  Patient use Atrovent nasal spray for her URI/allergy symptoms    Patient to return after ER evaluation if needed

## 2018-10-09 NOTE — TELEPHONE ENCOUNTER
Left message for patient that her WBC and diff was normal  I asked for a call back with any questions or concerns  Phone number provided

## 2018-10-10 DIAGNOSIS — F32.A DEPRESSION, UNSPECIFIED DEPRESSION TYPE: ICD-10-CM

## 2018-10-10 RX ORDER — FLUOXETINE HYDROCHLORIDE 20 MG/1
CAPSULE ORAL
Qty: 90 CAPSULE | Refills: 1 | Status: SHIPPED | OUTPATIENT
Start: 2018-10-10 | End: 2019-04-13 | Stop reason: SDUPTHER

## 2018-10-11 ENCOUNTER — VBI (OUTPATIENT)
Dept: ADMINISTRATIVE | Facility: OTHER | Age: 38
End: 2018-10-11

## 2018-10-12 NOTE — TELEPHONE ENCOUNTER
Sean Deutschmaalia    ED Visit Information     Ed visit date: 10/9/18  Diagnosis Description: Low back strain; Abdominal muscle strain  In Network? Yes [address]  Discharge status: Home  Discharged with meds ? NA  Number of ED visits to date: 1  ED Severity:3     Outreach Information    Outreach successful: Yes 2  Date letter mailed:0  Date Finalized:10/12/18    Care Coordination    Follow up appointment with pcp: no declined  Transportation issues ? No    [facility] [employer] type:  [address] [place] [address] [facility] PCP:  Yes  Transportation:  Self Transport  Called PCP first?:  Yes  Would have used same-day or next-day if offered?:  Yes  Feels able to call PCP for urgent problems ?:  Yes  Understands what emergencies can be handled by PCP ?:  Yes  Ever any problems getting appointment with PCP for minor emergency/urgency problems?:  No  Urgent care Education?:  Yes  Spoke with Mick Jane today she stated she went to ED per her doctor she was told to go to the ER  Mick Jane is feeling much better since her ED visit and has declined follow at this time  Mickramya Jane also mentioned that she has used [facility] on [address] in the past , but most of the time the wait is terrible  regardless she does try to use them, if PCP is not available, or for testings such as blood work

## 2018-11-28 ENCOUNTER — CLINICAL SUPPORT (OUTPATIENT)
Dept: FAMILY MEDICINE CLINIC | Facility: CLINIC | Age: 38
End: 2018-11-28
Payer: COMMERCIAL

## 2018-11-28 DIAGNOSIS — Z23 NEED FOR INFLUENZA VACCINATION: Primary | ICD-10-CM

## 2018-11-28 PROCEDURE — 90471 IMMUNIZATION ADMIN: CPT

## 2018-11-28 PROCEDURE — 90686 IIV4 VACC NO PRSV 0.5 ML IM: CPT

## 2018-12-05 ENCOUNTER — ANNUAL EXAM (OUTPATIENT)
Dept: OBGYN CLINIC | Facility: MEDICAL CENTER | Age: 38
End: 2018-12-05
Payer: COMMERCIAL

## 2018-12-05 VITALS
HEIGHT: 60 IN | SYSTOLIC BLOOD PRESSURE: 144 MMHG | DIASTOLIC BLOOD PRESSURE: 86 MMHG | BODY MASS INDEX: 22.42 KG/M2 | WEIGHT: 114.2 LBS

## 2018-12-05 DIAGNOSIS — Z01.419 ENCOUNTER FOR ROUTINE GYNECOLOGICAL EXAMINATION WITH PAPANICOLAOU SMEAR OF CERVIX: Primary | ICD-10-CM

## 2018-12-05 DIAGNOSIS — N83.201 CYST OF RIGHT OVARY: ICD-10-CM

## 2018-12-05 PROCEDURE — 87624 HPV HI-RISK TYP POOLED RSLT: CPT | Performed by: OBSTETRICS & GYNECOLOGY

## 2018-12-05 PROCEDURE — G0145 SCR C/V CYTO,THINLAYER,RESCR: HCPCS | Performed by: OBSTETRICS & GYNECOLOGY

## 2018-12-05 PROCEDURE — S0612 ANNUAL GYNECOLOGICAL EXAMINA: HCPCS | Performed by: OBSTETRICS & GYNECOLOGY

## 2018-12-05 NOTE — PROGRESS NOTES
ASSESSMENT & PLAN: Perfecto Benavides was seen today for gynecologic exam     Diagnoses and all orders for this visit:    Encounter for routine gynecological examination with Papanicolaou smear of cervix  -     Liquid-based pap, screening    Cyst of right ovary  -     US pelvis complete non OB; Future          1  Routine well woman exam done today  2  Pap and HPV:  Patient's pap is not current  Pap and cotesting was done today  Current ASCCP Guidelines reviewed  3   The following were reviewed in today's visit: adequate intake of calcium and vitamin D, exercise and healthy diet  4   F/u in 1 year for annual gyn exam   5   Patient desires to have her right ovarian cyst removed  Discussed a laparoscopic right ovarian cystectomy  Have tentatively scheduled for 2/4/2019  Patient will have a repeat pelvic ultrasound  Patient will also return for a preoperative evaluation prior to the procedure  CC:  Annual Gynecologic Examination    HPI: Adryan Willams is a 43 y o  who presents for annual gynecologic examination  She has the following concerns:  Wants to discuss get ovarian cyst removed  Patient has not been able to address it due to her mother passing  Patient reports now that she has lost weight it is really bothering her  It is now causing some dyspareunia also  Health Maintenance:    Patient reports her health to be good  She does have weight concerns, had gastric bypass  She exercises 2 days per week with elliptical, walking and gets 10,000 steps  She does wear her seatbelt routinely  She does perform regular monthly self breast exams  She does feels safe at home  Patients does follow a low sugar, high protein diet  She gets 2 servings of dairy or calcium rich foods a day      Patient Active Problem List   Diagnosis    Allergic rhinitis    Cardiac murmur    Depression    Herpes simplex infection    Low vitamin B12 level    Postsurgical malabsorption    Right ovarian cyst    Secondary hyperparathyroidism ([facility] [address] )    Bariatric surgery status    Leukopenia    Vitamin A deficiency    Muscle spasm of back       Past Medical History:   Diagnosis Date    Allergic rhinitis 2013    Anxiety     Cat scratch     On arms    Depression     Fatty liver     Herpes     Last outbreak was within the past year but she doesn't remember when    Hypertension     Mitral regurgitation     Murmur, cardiac     "slight" since childhood    Obesity     Ovarian cyst     Pneumonia 2003    x1- double pneumonia and was hospitalized    Transaminitis     Tricuspid regurgitation        Past Surgical History:   Procedure Laterality Date     SECTION      x1    ENDOMETRIAL ABLATION      ESOPHAGOGASTRODUODENOSCOPY N/A 2017    Procedure: ESOPHAGOGASTRODUODENOSCOPY (EGD); Surgeon: Se Mart MD;  [facility];  Service:     CT EGD TRANSORAL BIOPSY SINGLE/MULTIPLE N/A 3/1/2017    Procedure: ESOPHAGOGASTRODUODENOSCOPY (EGD) with gastric bx;  Surgeon: Se Mart MD;  [facility]; Service: Bariatrics    CT LAP GASTRIC BYPASS/EMILIA-EN-Y N/A 2017    Procedure: BYPASS GASTRIC  EMILIA-EN-Y LAPAROSCOPIC;  Surgeon: Se Mart MD;  [facility];  Service: Bariatrics    TUBAL LIGATION         Past OB/Gyn History:  Pt does not have menstrual issues  History of sexually transmitted infection: No   History of abnormal pap smears: No     Patient is currently sexually active  heterosexual    The current method of family planning is tubal ligation      Family History   Problem Relation Age of Onset    Anxiety disorder Mother     Depression Mother     Leukemia Mother     Hypertension Father     Coronary artery disease Maternal Grandmother     Stroke Maternal Grandfather        Social History:  Social History     Social History    Marital status: /Civil Union     Spouse name: N/A    Number of children: N/A    Years of education: N/A     Occupational History  Not on file  Social History Main Topics    Smoking status: Never Smoker    Smokeless tobacco: Never Used    Alcohol use Yes      Comment: social    Drug use: No    Sexual activity: Yes     Partners: Male     Birth control/ protection: Female Sterilization     Other Topics Concern    Not on file     Social History Narrative    No secondhand smoke exposure     Presently lives with spouse and 15 y o  son  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  Allergies   Allergen Reactions    Wellbutrin [Bupropion] Other (See Comments) and Hyperactivity     "felt like she could climb the wall"       Current Outpatient Prescriptions:     Calcium Citrate-Vitamin D (UPCAL D) 500-500 MG-UNT/5GM POWD, Take by mouth, Disp: , Rfl:     cholecalciferol (VITAMIN D3) 1,000 units tablet, Take by mouth daily, Disp: , Rfl:     FLUoxetine (PROzac) 20 mg capsule, TAKE ONE CAPSULE BY MOUTH DAILY, Disp: 90 capsule, Rfl: 1    fluticasone (FLONASE) 50 mcg/act nasal spray, 2 sprays into each nostril daily, Disp: , Rfl:     ipratropium (ATROVENT) 0 03 % nasal spray, 2 sprays into each nostril 3 (three) times a day, Disp: 30 mL, Rfl: 4    IRON PO, Take by mouth, Disp: , Rfl:     Iron-Vitamin C (IRON 100/C) 100-250 MG TABS, Take by mouth, Disp: , Rfl:     Multiple Vitamins-Minerals (BARIATRIC FUSION) CHEW, Chew, Disp: , Rfl:     valACYclovir (VALTREX) 500 mg tablet, Take 1 tablet (500 mg total) by mouth daily, Disp: 90 tablet, Rfl: 3    vitamin E, tocopherol, 400 units capsule, Take 400 Units by mouth daily, Disp: , Rfl:       Review of Systems  Constitutional :no fever, feels well, no tiredness, no recent weight gain or loss  ENT: no ear ache, no loss of hearing, no nosebleeds or nasal discharge, no sore throat or hoarseness  Cardiovascular: no complaints of slow or fast heart beat, no chest pain, no palpitations, no leg claudication or lower extremity edema    Respiratory: no complaints of shortness of shortness of breath, no COFFEY  Breasts:no complaints of breast pain, breast lump, or nipple discharge  Gastrointestinal: no complaints of abdominal pain, constipation, nausea, vomiting, or diarrhea or bloody stools  Genitourinary : no complaints of dysuria, incontinence, +pelvic pain, no dysmenorrhea, vaginal discharge or abnormal vaginal bleeding and as noted in HPI  Musculoskeletal: no complaints of arthralgia, no myalgia, no joint swelling or stiffness, no limb pain or swelling  Integumentary: no complaints of skin rash or lesion, itching or dry skin  Neurological: no complaints of headache, no confusion, no numbness or tingling, no dizziness or fainting    Physical Exam:   /86   Ht 5' (1 524 m)   Wt 51 8 kg (114 lb 3 2 oz)   LMP 11/22/2018   BMI 22 30 kg/m²     General: appears stated age, cooperative, alert normal mood and affect   Psychiatric oriented to person, place and time  Mood and affect normal   Neck: normal, supple,trachea midline, no masses  Thyroid: normal, no thyromegaly   Heart: regular rate and rhythm, S1, S2 normal, no murmur, click, rub or gallop   Lungs: clear to auscultation bilaterally, no increased work of breathing or signs of respiratory distress   Breasts: normal, no dimpling or skin changes noted   Abdomen: soft, non-tender, without masses or organomegaly   Vulva: normal , no lesions   Vagina: normal , no lesions or dryness   Urethra: normal   Urethal meatus normal   Bladder Normal, soft, non-tender and no prolapse or masses appreciated   Cervix: normal, no palpable masses    Uterus: normal , non-tender, not enlarged, no palpable masses   Adnexa: normal, right adnexa with mild tenderness mobile approximately 6 cm mass palpated  Lymphatic Palpation of lymph nodes in neck, axilla, groin and/or other locations: no lymphadenopathy or masses noted   Skin Normal skin turgor and no rashes    Palpation of skin and subcutaneous tissue normal

## 2018-12-07 LAB
HPV HR 12 DNA CVX QL NAA+PROBE: NEGATIVE
HPV16 DNA CVX QL NAA+PROBE: NEGATIVE
HPV18 DNA CVX QL NAA+PROBE: NEGATIVE

## 2018-12-10 LAB
LAB AP GYN PRIMARY INTERPRETATION: NORMAL
Lab: NORMAL

## 2018-12-11 ENCOUNTER — TELEPHONE (OUTPATIENT)
Dept: OBGYN CLINIC | Facility: MEDICAL CENTER | Age: 38
End: 2018-12-11

## 2018-12-11 NOTE — TELEPHONE ENCOUNTER
----- Message from Maxine Beyer sent at 12/11/2018 10:40 AM EST -----  Tried calling pt but vm was full  Will attempt another time   ----- Message -----  From: Santo Foss MD  Sent: 12/5/2018   9:51 AM  To: Maxine Beyer    Please schedule a laparoscopic right ovarian cystectomy for this patient 2/4/2019  Thank you

## 2018-12-19 ENCOUNTER — TELEPHONE (OUTPATIENT)
Dept: OBGYN CLINIC | Facility: MEDICAL CENTER | Age: 38
End: 2018-12-19

## 2018-12-19 NOTE — TELEPHONE ENCOUNTER
----- Message from Gina Santiago sent at 12/19/2018  1:21 PM EST -----  Letter mailed to pt to contact us  lmovm again 3x    ----- Message -----  From: Gina Santiago  Sent: 12/12/2018   1:34 PM  To: Gina Santiago    lmovm to cb   ----- Message -----  From: Gina Santiago  Sent: 12/11/2018  10:40 AM  To: Gina Santiago    Tried calling pt but vm was full  Will attempt another time   ----- Message -----  From: Abdullahi Rankin MD  Sent: 12/5/2018   9:51 AM  To: Gina Santiago    Please schedule a laparoscopic right ovarian cystectomy for this patient 2/4/2019  Thank you

## 2018-12-28 ENCOUNTER — HOSPITAL ENCOUNTER (OUTPATIENT)
Dept: ULTRASOUND IMAGING | Facility: MEDICAL CENTER | Age: 38
Discharge: HOME | End: 2018-12-28
Payer: COMMERCIAL

## 2018-12-28 DIAGNOSIS — N83.201 CYST OF RIGHT OVARY: ICD-10-CM

## 2018-12-28 PROCEDURE — 76830 TRANSVAGINAL US NON-OB: CPT

## 2018-12-28 PROCEDURE — 76856 US EXAM PELVIC COMPLETE: CPT

## 2019-01-08 ENCOUNTER — TELEPHONE (OUTPATIENT)
Dept: OBGYN CLINIC | Facility: MEDICAL CENTER | Age: 39
End: 2019-01-08

## 2019-02-18 ENCOUNTER — OFFICE VISIT (OUTPATIENT)
Dept: FAMILY MEDICINE CLINIC | Facility: CLINIC | Age: 39
End: 2019-02-18
Payer: COMMERCIAL

## 2019-02-18 VITALS
WEIGHT: 115 LBS | BODY MASS INDEX: 22.58 KG/M2 | SYSTOLIC BLOOD PRESSURE: 122 MMHG | DIASTOLIC BLOOD PRESSURE: 76 MMHG | HEIGHT: 60 IN | HEART RATE: 64 BPM

## 2019-02-18 DIAGNOSIS — K91.2 POSTSURGICAL MALABSORPTION: ICD-10-CM

## 2019-02-18 DIAGNOSIS — N25.81 SECONDARY HYPERPARATHYROIDISM (HCC): ICD-10-CM

## 2019-02-18 DIAGNOSIS — E50.9 VITAMIN A DEFICIENCY: ICD-10-CM

## 2019-02-18 DIAGNOSIS — N83.201 RIGHT OVARIAN CYST: ICD-10-CM

## 2019-02-18 DIAGNOSIS — F32.A DEPRESSION, UNSPECIFIED DEPRESSION TYPE: ICD-10-CM

## 2019-02-18 DIAGNOSIS — B00.9 HERPES SIMPLEX INFECTION: ICD-10-CM

## 2019-02-18 DIAGNOSIS — E53.8 LOW VITAMIN B12 LEVEL: ICD-10-CM

## 2019-02-18 DIAGNOSIS — J30.9 ALLERGIC RHINITIS, UNSPECIFIED SEASONALITY, UNSPECIFIED TRIGGER: Primary | ICD-10-CM

## 2019-02-18 PROBLEM — D72.819 LEUKOPENIA: Status: RESOLVED | Noted: 2018-07-23 | Resolved: 2019-02-18

## 2019-02-18 PROCEDURE — 99214 OFFICE O/P EST MOD 30 MIN: CPT | Performed by: FAMILY MEDICINE

## 2019-02-18 NOTE — PROGRESS NOTES
Assessment/Plan:  1  Allergic rhinitis, asymptomatic at the present time  2  Vitamin deficiencies a/B12/postsurgical malabsorption/secondary hyperparathyroidism patient is to have blood work and follow up with [facility] bariatric specialists  3  Herpes simplex, stable on Valtrex  4  Depression, stable on Prozac 20 mg daily   5  Right ovarian cyst patient follows up with OBGYN  6  Patient to return in 6 months, sooner if need           Allergic rhinitis  Asymptomatic at present    Right ovarian cyst  Patient has follow-up with OBGYN    Vitamin A deficiency  To have blood work with bariatric specialist    Low vitamin B12 level  To have blood work with bariatric specialist    Depression  Stable continue Prozac 20 mg daily    Herpes simplex infection  Stable on Valtrex    Postsurgical malabsorption  Follows with bariatric surgery    Secondary hyperparathyroidism ([facility] [address] )  Follows with bariatric surgery       Diagnoses and all orders for this visit:    Allergic rhinitis, unspecified seasonality, unspecified trigger    Right ovarian cyst    Vitamin A deficiency    Low vitamin B12 level    Depression, unspecified depression type    Herpes simplex infection    Postsurgical malabsorption    Secondary hyperparathyroidism ([facility] [address] )          Subjective: patient is here for a 6 month f/u re: chronic medical conditions  ak     Patient ID: Marge Reed is a 43 y o  female  Patient doing well without any medical complaints concerns at the present time  Except for right ovarian cyst she seeing OBGYN for this week  May need surgery  Otherwise patient is doing well she will see bariatric X in May or June and will have blood work with them      The following portions of the patient's history were reviewed and updated as appropriate: allergies, current medications, past family history, past medical history, past social history, past surgical history and problem list     Review of Systems   Constitutional: Negative      HENT: Negative  Eyes: Negative  Respiratory: Negative  Cardiovascular: Negative  Gastrointestinal:        HPI   Endocrine: Negative  Genitourinary:        HPI   Musculoskeletal: Negative  Skin: Negative  Allergic/Immunologic: Negative  Neurological: Negative  Hematological: Negative  Psychiatric/Behavioral: Negative  Objective:      /76   Pulse 64   Ht 4' 11 7" (1 516 m)   Wt 52 2 kg (115 lb)   BMI 22 69 kg/m²          Physical Exam   Constitutional: She is oriented to person, place, and time  She appears well-developed and well-nourished  HENT:   Head: Normocephalic and atraumatic  Right Ear: External ear normal    Left Ear: External ear normal    Nose: Nose normal    Mouth/Throat: Oropharynx is clear and moist  No oropharyngeal exudate  Eyes: Pupils are equal, round, and reactive to light  Conjunctivae and EOM are normal  No scleral icterus  Neck: Neck supple  No JVD present  No tracheal deviation present  No thyromegaly present  Cardiovascular: Normal rate, regular rhythm and normal heart sounds  Pulmonary/Chest: Effort normal    Abdominal: Soft  Bowel sounds are normal  There is no tenderness  Musculoskeletal: She exhibits no edema  Lymphadenopathy:     She has no cervical adenopathy  Neurological: She is alert and oriented to person, place, and time  No cranial nerve deficit  Skin: Skin is warm and dry  Psychiatric: She has a normal mood and affect

## 2019-02-25 ENCOUNTER — OFFICE VISIT (OUTPATIENT)
Dept: OBGYN CLINIC | Facility: MEDICAL CENTER | Age: 39
End: 2019-02-25

## 2019-02-25 VITALS
BODY MASS INDEX: 23.52 KG/M2 | WEIGHT: 119.8 LBS | SYSTOLIC BLOOD PRESSURE: 140 MMHG | DIASTOLIC BLOOD PRESSURE: 76 MMHG | HEIGHT: 60 IN

## 2019-02-25 DIAGNOSIS — Z01.818 PREOPERATIVE EXAM FOR GYNECOLOGIC SURGERY: Primary | ICD-10-CM

## 2019-02-25 DIAGNOSIS — N83.201 RIGHT OVARIAN CYST: ICD-10-CM

## 2019-02-25 PROCEDURE — PREOP: Performed by: OBSTETRICS & GYNECOLOGY

## 2019-02-25 RX ORDER — SODIUM CHLORIDE, SODIUM LACTATE, POTASSIUM CHLORIDE, CALCIUM CHLORIDE 600; 310; 30; 20 MG/100ML; MG/100ML; MG/100ML; MG/100ML
125 INJECTION, SOLUTION INTRAVENOUS CONTINUOUS
Status: CANCELLED | OUTPATIENT
Start: 2019-03-04

## 2019-02-25 NOTE — H&P (VIEW-ONLY)
H&P Exam - Gynecology      Assessment: Amanda Gray was seen today for physical exam and pre-op exam      Diagnoses and all orders for this visit:     Preoperative exam for gynecologic surgery     Right ovarian cyst           Plan:  Patient desires to proceed with definitive surgical management  We plan to proceed with laproscopic right ovarian cystectomy  Consent reviewed and signed  Reviewed risks of procedure  Risks include infection, blood clots, risk of wound healing problems, and possible damage or injury to surrounding structures including bowel, bladder, ureters, blood vessels and nerves  Pre and postoperative instructions reviewed with patient  Entire surgical packet reviewed  Patient will not need to present to pre admissions testing  Patient will not need medical clearance  Procedure planned for 3/4/19  Patient was given hibiclens for pre-procedure wash    All questions answered          Subjective:  Patient presents for preoperative examination            Patient Active Problem List   Diagnosis    Allergic rhinitis    Depression    Herpes simplex infection    Low vitamin B12 level    Postsurgical malabsorption    Right ovarian cyst    Secondary hyperparathyroidism ([facility] [address] )    Bariatric surgery status    Vitamin A deficiency    Muscle spasm of back         Medical History        Past Medical History:   Diagnosis Date    Allergic rhinitis 2013    Anxiety      Cat scratch       On arms    Depression      Fatty liver      Herpes       Last outbreak was within the past year but she doesn't remember when    Hypertension      Mitral regurgitation      Murmur, cardiac       "slight" since childhood    Obesity      Ovarian cyst      Pneumonia      x1- double pneumonia and was hospitalized    Transaminitis      Tricuspid regurgitation              Surgical History   Past Surgical History:   Procedure Laterality Date     SECTION         x1    ENDOMETRIAL ABLATION        ESOPHAGOGASTRODUODENOSCOPY N/A 5/30/2017     Procedure: ESOPHAGOGASTRODUODENOSCOPY (EGD); Surgeon: Faisal James MD;  [facility];  Service:     VT EGD TRANSORAL BIOPSY SINGLE/MULTIPLE N/A 3/1/2017     Procedure: ESOPHAGOGASTRODUODENOSCOPY (EGD) with gastric bx;  Surgeon: Faisal James MD;  [facility];   Service: Bariatrics    VT LAP GASTRIC BYPASS/EMILIA-EN-Y N/A 5/30/2017     Procedure: BYPASS GASTRIC  EMILIA-EN-Y LAPAROSCOPIC;  Surgeon: Faisal James MD;  [facility];  Service: Bariatrics    TUBAL LIGATION                Family History   Problem Relation Age of Onset    Anxiety disorder Mother      Depression Mother      Leukemia Mother      Hypertension Father      Coronary artery disease Maternal Grandmother      Stroke Maternal Grandfather           Social History                  Allergies   Allergen Reactions    Wellbutrin [Bupropion] Other (See Comments) and Hyperactivity       "felt like she could climb the wall"            Current Outpatient Medications:     Calcium Citrate-Vitamin D (UPCAL D) 500-500 MG-UNT/5GM POWD, Take by mouth, Disp: , Rfl:     cholecalciferol (VITAMIN D3) 1,000 units tablet, Take by mouth daily, Disp: , Rfl:     FLUoxetine (PROzac) 20 mg capsule, TAKE ONE CAPSULE BY MOUTH DAILY, Disp: 90 capsule, Rfl: 1    fluticasone (FLONASE) 50 mcg/act nasal spray, 2 sprays into each nostril daily, Disp: , Rfl:     IRON PO, Take by mouth, Disp: , Rfl:     Iron-Vitamin C (IRON 100/C) 100-250 MG TABS, Take by mouth, Disp: , Rfl:     Multiple Vitamins-Minerals (BARIATRIC FUSION) CHEW, Chew, Disp: , Rfl:     valACYclovir (VALTREX) 500 mg tablet, Take 1 tablet (500 mg total) by mouth daily, Disp: 90 tablet, Rfl: 3    vitamin E, tocopherol, 400 units capsule, Take 400 Units by mouth daily, Disp: , Rfl:      Review of Systems  Constitutional :no fever, feels well, no tiredness, no recent weight gain or loss  ENT: no ear ache, no loss of hearing, no nosebleeds or nasal discharge, no sore throat or hoarseness  Cardiovascular: no complaints of slow or fast heart beat, no chest pain, no palpitations, no leg claudication or lower extremity edema  Respiratory: no complaints of shortness of shortness of breath, no COFFEY  Breasts:no complaints of breast pain, breast lump, or nipple discharge  Gastrointestinal: no complaints of abdominal pain, constipation, nausea, vomiting, or diarrhea or bloody stools  Genitourinary : no complaints of dysuria, incontinence, +pelvic pain, no dysmenorrhea, vaginal discharge or abnormal vaginal bleeding and as noted in HPI  Musculoskeletal: no complaints of arthralgia, no myalgia, no joint swelling or stiffness, no limb pain or swelling  Integumentary: no complaints of skin rash or lesion, itching or dry skin  Neurological: no complaints of headache, no confusion, no numbness or tingling, no dizziness or fainting     Objective:      /76   Ht 5' (1 524 m)   Wt 54 3 kg (119 lb 12 8 oz)   LMP 02/15/2019 (Exact Date)   BMI 23 40 kg/m²   General: appears stated age, cooperative, alert normal mood and affect   Psychiatric oriented to person, place and time  Mood and affect normal   Neck: normal, supple,trachea midline, no masses  Thyroid: normal, no thyromegaly   Heart: regular rate and rhythm, S1, S2 normal, no murmur, click, rub or gallop   Lungs: clear to auscultation bilaterally, no increased work of breathing or signs of respiratory distress   Breasts: normal, no dimpling or skin changes noted   Abdomen: soft, non-tender, without masses or organomegaly   Vulva: normal , no lesions   Vagina: normal , no lesions or dryness   Urethra: normal   Urethal meatus normal   Bladder Normal, soft, non-tender and no prolapse or masses appreciated   Cervix: normal, no palpable masses    Uterus: normal , non-tender, not enlarged, no palpable masses   Adnexa: Normal on left   normal, right adnexa with mild tenderness mobile approximately 6 cm mass palpated     Lymphatic Palpation of lymph nodes in neck

## 2019-02-25 NOTE — PROGRESS NOTES
H&P Exam - Gynecology     Assessment: Karthik Mcnally was seen today for physical exam and pre-op exam     Diagnoses and all orders for this visit:    Preoperative exam for gynecologic surgery    Right ovarian cyst        Plan:  Patient desires to proceed with definitive surgical management  We plan to proceed with laproscopic right ovarian cystectomy  Consent reviewed and signed  Reviewed risks of procedure  Risks include infection, blood clots, risk of wound healing problems, and possible damage or injury to surrounding structures including bowel, bladder, ureters, blood vessels and nerves  Pre and postoperative instructions reviewed with patient  Entire surgical packet reviewed  Patient will not need to present to pre admissions testing  Patient will not need medical clearance  Procedure planned for 3/4/19  Patient was given hibiclens for pre-procedure wash  All questions answered  Subjective:  Patient presents for preoperative examination        Patient Active Problem List   Diagnosis    Allergic rhinitis    Depression    Herpes simplex infection    Low vitamin B12 level    Postsurgical malabsorption    Right ovarian cyst    Secondary hyperpar[facility]roidism ([facility] [address] )    Bariatric surgery status    Vitamin A deficiency    Muscle spasm of back       Past Medical History:   Diagnosis Date    Allergic rhinitis 2013    Anxiety     Cat scratch     On arms    Depression     Fatty liver     Herpes     Last outbreak was within the past year but she doesn't remember when    Hypertension     Mitral regurgitation     Murmur, cardiac     "slight" since childhood    Obesity     Ovarian cyst     Pneumonia 2003    x1- double pneumonia and was hospitalized    Transaminitis     Tricuspid regurgitation        Past Surgical History:   Procedure Laterality Date     SECTION      x1    ENDOMETRIAL ABLATION      ESOPHAGOGASTRODUODENOSCOPY N/A 2017    Procedure: ESOPHAGOGASTRODUODENOSCOPY (EGD); Surgeon: Vicenta Cratf MD;  [facility];  Service:     MA EGD TRANSORAL BIOPSY SINGLE/MULTIPLE N/A 3/1/2017    Procedure: ESOPHAGOGASTRODUODENOSCOPY (EGD) with gastric bx;  Surgeon: Vicenta Craft MD;  [facility]; Service: Bariatrics    MA LAP GASTRIC BYPASS/EMILIA-EN-Y N/A 5/30/2017    Procedure: BYPASS GASTRIC  EMILIA-EN-Y LAPAROSCOPIC;  Surgeon: Vicenta Craft MD;  [facility];  Service: Bariatrics    TUBAL LIGATION         Family History   Problem Relation Age of Onset    Anxiety disorder Mother     Depression Mother     Leukemia Mother     Hypertension Father     Coronary artery disease Maternal Grandmother     Stroke Maternal Grandfather        Social History     Allergies   Allergen Reactions    Wellbutrin [Bupropion] Other (See Comments) and Hyperactivity     "felt like she could climb the wall"         Current Outpatient Medications:     Calcium Citrate-Vitamin D (UPCAL D) 500-500 MG-UNT/5GM POWD, Take by mouth, Disp: , Rfl:     cholecalciferol (VITAMIN D3) 1,000 units tablet, Take by mouth daily, Disp: , Rfl:     FLUoxetine (PROzac) 20 mg capsule, TAKE ONE CAPSULE BY MOUTH DAILY, Disp: 90 capsule, Rfl: 1    fluticasone (FLONASE) 50 mcg/act nasal spray, 2 sprays into each nostril daily, Disp: , Rfl:     IRON PO, Take by mouth, Disp: , Rfl:     Iron-Vitamin C (IRON 100/C) 100-250 MG TABS, Take by mouth, Disp: , Rfl:     Multiple Vitamins-Minerals (BARIATRIC FUSION) CHEW, Chew, Disp: , Rfl:     valACYclovir (VALTREX) 500 mg tablet, Take 1 tablet (500 mg total) by mouth daily, Disp: 90 tablet, Rfl: 3    vitamin E, tocopherol, 400 units capsule, Take 400 Units by mouth daily, Disp: , Rfl:     Review of Systems  Constitutional :no fever, feels well, no tiredness, no recent weight gain or loss  ENT: no ear ache, no loss of hearing, no nosebleeds or nasal discharge, no sore throat or hoarseness    Cardiovascular: no complaints of slow or fast heart beat, no chest pain, no palpitations, no leg claudication or lower extremity edema  Respiratory: no complaints of shortness of shortness of breath, no COFFEY  Breasts:no complaints of breast pain, breast lump, or nipple discharge  Gastrointestinal: no complaints of abdominal pain, constipation, nausea, vomiting, or diarrhea or bloody stools  Genitourinary : no complaints of dysuria, incontinence, +pelvic pain, no dysmenorrhea, vaginal discharge or abnormal vaginal bleeding and as noted in HPI  Musculoskeletal: no complaints of arthralgia, no myalgia, no joint swelling or stiffness, no limb pain or swelling  Integumentary: no complaints of skin rash or lesion, itching or dry skin  Neurological: no complaints of headache, no confusion, no numbness or tingling, no dizziness or fainting    Objective:     /76   Ht 5' (1 524 m)   Wt 54 3 kg (119 lb 12 8 oz)   LMP 02/15/2019 (Exact Date)   BMI 23 40 kg/m²   General: appears stated age, cooperative, alert normal mood and affect   Psychiatric oriented to person, place and time  Mood and affect normal   Neck: normal, supple,trachea midline, no masses  Thyroid: normal, no thyromegaly   Heart: regular rate and rhythm, S1, S2 normal, no murmur, click, rub or gallop   Lungs: clear to auscultation bilaterally, no increased work of breathing or signs of respiratory distress   Breasts: normal, no dimpling or skin changes noted   Abdomen: soft, non-tender, without masses or organomegaly   Vulva: normal , no lesions   Vagina: normal , no lesions or dryness   Urethra: normal   Urethal meatus normal   Bladder Normal, soft, non-tender and no prolapse or masses appreciated   Cervix: normal, no palpable masses    Uterus: normal , non-tender, not enlarged, no palpable masses   Adnexa: Normal on left  normal, right adnexa with mild tenderness mobile approximately 6 cm mass palpated     Lymphatic Palpation of lymph nodes in neck, axilla, groin and/or other locations: no lymphadenopathy or masses noted   Skin Normal skin turgor and no rashes    Palpation of skin and subcutaneous tissue normal

## 2019-02-25 NOTE — H&P
H&P Exam - Gynecology      Assessment: Beryl Stone was seen today for physical exam and pre-op exam      Diagnoses and all orders for this visit:     Preoperative exam for gynecologic surgery     Right ovarian cyst           Plan:  Patient desires to proceed with definitive surgical management  We plan to proceed with laproscopic right ovarian cystectomy  Consent reviewed and signed  Reviewed risks of procedure  Risks include infection, blood clots, risk of wound healing problems, and possible damage or injury to surrounding structures including bowel, bladder, ureters, blood vessels and nerves  Pre and postoperative instructions reviewed with patient  Entire surgical packet reviewed  Patient will not need to present to pre admissions testing  Patient will not need medical clearance  Procedure planned for 3/4/19  Patient was given hibiclens for pre-procedure wash    All questions answered          Subjective:  Patient presents for preoperative examination            Patient Active Problem List   Diagnosis    Allergic rhinitis    Depression    Herpes simplex infection    Low vitamin B12 level    Postsurgical malabsorption    Right ovarian cyst    Secondary hyperpar[facility]roidism ([facility] [address] )    Bariatric surgery status    Vitamin A deficiency    Muscle spasm of back         Medical History        Past Medical History:   Diagnosis Date    Allergic rhinitis 2013    Anxiety      Cat scratch       On arms    Depression      Fatty liver      Herpes       Last outbreak was within the past year but she doesn't remember when    Hypertension      Mitral regurgitation      Murmur, cardiac       "slight" since childhood    Obesity      Ovarian cyst      Pneumonia      x1- double pneumonia and was hospitalized    Transaminitis      Tricuspid regurgitation              Surgical History   Past Surgical History:   Procedure Laterality Date     SECTION         x1    ENDOMETRIAL ABLATION        ESOPHAGOGASTRODUODENOSCOPY N/A 5/30/2017     Procedure: ESOPHAGOGASTRODUODENOSCOPY (EGD); Surgeon: Chapin Mckinney MD;  [facility];  Service:     VT EGD TRANSORAL BIOPSY SINGLE/MULTIPLE N/A 3/1/2017     Procedure: ESOPHAGOGASTRODUODENOSCOPY (EGD) with gastric bx;  Surgeon: Chapin Mckinney MD;  [facility];   Service: Bariatrics    VT LAP GASTRIC BYPASS/EMILIA-EN-Y N/A 5/30/2017     Procedure: BYPASS GASTRIC  EMILIA-EN-Y LAPAROSCOPIC;  Surgeon: Chapin Mckinney MD;  [facility];  Service: Bariatrics    TUBAL LIGATION                Family History   Problem Relation Age of Onset    Anxiety disorder Mother      Depression Mother      Leukemia Mother      Hypertension Father      Coronary artery disease Maternal Grandmother      Stroke Maternal Grandfather           Social History                  Allergies   Allergen Reactions    Wellbutrin [Bupropion] Other (See Comments) and Hyperactivity       "felt like she could climb the wall"            Current Outpatient Medications:     Calcium Citrate-Vitamin D (UPCAL D) 500-500 MG-UNT/5GM POWD, Take by mouth, Disp: , Rfl:     cholecalciferol (VITAMIN D3) 1,000 units tablet, Take by mouth daily, Disp: , Rfl:     FLUoxetine (PROzac) 20 mg capsule, TAKE ONE CAPSULE BY MOUTH DAILY, Disp: 90 capsule, Rfl: 1    fluticasone (FLONASE) 50 mcg/act nasal spray, 2 sprays into each nostril daily, Disp: , Rfl:     IRON PO, Take by mouth, Disp: , Rfl:     Iron-Vitamin C (IRON 100/C) 100-250 MG TABS, Take by mouth, Disp: , Rfl:     Multiple Vitamins-Minerals (BARIATRIC FUSION) CHEW, Chew, Disp: , Rfl:     valACYclovir (VALTREX) 500 mg tablet, Take 1 tablet (500 mg total) by mouth daily, Disp: 90 tablet, Rfl: 3    vitamin E, tocopherol, 400 units capsule, Take 400 Units by mouth daily, Disp: , Rfl:      Review of Systems  Constitutional :no fever, feels well, no tiredness, no recent weight gain or loss  ENT: no ear ache, no loss of hearing, no nosebleeds or nasal discharge, no sore throat or hoarseness  Cardiovascular: no complaints of slow or fast heart beat, no chest pain, no palpitations, no leg claudication or lower extremity edema  Respiratory: no complaints of shortness of shortness of breath, no COFFEY  Breasts:no complaints of breast pain, breast lump, or nipple discharge  Gastrointestinal: no complaints of abdominal pain, constipation, nausea, vomiting, or diarrhea or bloody stools  Genitourinary : no complaints of dysuria, incontinence, +pelvic pain, no dysmenorrhea, vaginal discharge or abnormal vaginal bleeding and as noted in HPI  Musculoskeletal: no complaints of arthralgia, no myalgia, no joint swelling or stiffness, no limb pain or swelling  Integumentary: no complaints of skin rash or lesion, itching or dry skin  Neurological: no complaints of headache, no confusion, no numbness or tingling, no dizziness or fainting     Objective:      /76   Ht 5' (1 524 m)   Wt 54 3 kg (119 lb 12 8 oz)   LMP 02/15/2019 (Exact Date)   BMI 23 40 kg/m²   General: appears stated age, cooperative, alert normal mood and affect   Psychiatric oriented to person, place and time  Mood and affect normal   Neck: normal, supple,trachea midline, no masses  Thyroid: normal, no thyromegaly   Heart: regular rate and rhythm, S1, S2 normal, no murmur, click, rub or gallop   Lungs: clear to auscultation bilaterally, no increased work of breathing or signs of respiratory distress   Breasts: normal, no dimpling or skin changes noted   Abdomen: soft, non-tender, without masses or organomegaly   Vulva: normal , no lesions   Vagina: normal , no lesions or dryness   Urethra: normal   Urethal meatus normal   Bladder Normal, soft, non-tender and no prolapse or masses appreciated   Cervix: normal, no palpable masses    Uterus: normal , non-tender, not enlarged, no palpable masses   Adnexa: Normal on left   normal, right adnexa with mild tenderness mobile approximately 6 cm mass palpated     Lymphatic Palpation of lymph nodes in neck

## 2019-02-28 RX ORDER — ACETAMINOPHEN 500 MG
500 TABLET ORAL EVERY 6 HOURS PRN
COMMUNITY

## 2019-02-28 NOTE — PRE-PROCEDURE INSTRUCTIONS
Pre-Surgery Instructions:   Medication Instructions    acetaminophen (TYLENOL) 500 mg tablet Instructed patient per Anesthesia Guidelines   Calcium Citrate-Vitamin D (UPCAL D) 500-500 MG-UNT/5GM POWD Instructed patient per Anesthesia Guidelines   cholecalciferol (VITAMIN D3) 1,000 units tablet Instructed patient per Anesthesia Guidelines   FLUoxetine (PROzac) 20 mg capsule Instructed patient per Anesthesia Guidelines   fluticasone (FLONASE) 50 mcg/act nasal spray Instructed patient per Anesthesia Guidelines   IRON PO Instructed patient per Anesthesia Guidelines   Iron-Vitamin C (IRON 100/C) 100-250 MG TABS Instructed patient per Anesthesia Guidelines   Multiple Vitamins-Minerals (BARIATRIC FUSION) CHEW Instructed patient per Anesthesia Guidelines   valACYclovir (VALTREX) 500 mg tablet Instructed patient per Anesthesia Guidelines   vitamin E, tocopherol, 400 units capsule Instructed patient per Anesthesia Guidelines  Per anesthesia patient was told to stop NSAIDS and supplements as of today other than Iron  On DOS with sip of water may take Prozac  Instructed on what to do if she becomes ill with fever/infection

## 2019-03-01 ENCOUNTER — ANESTHESIA EVENT (OUTPATIENT)
Dept: PERIOP | Facility: HOSPITAL | Age: 39
End: 2019-03-01
Payer: COMMERCIAL

## 2019-03-04 ENCOUNTER — ANESTHESIA (OUTPATIENT)
Dept: PERIOP | Facility: HOSPITAL | Age: 39
End: 2019-03-04
Payer: COMMERCIAL

## 2019-03-04 ENCOUNTER — HOSPITAL ENCOUNTER (OUTPATIENT)
Facility: HOSPITAL | Age: 39
Setting detail: OUTPATIENT SURGERY
Discharge: HOME | End: 2019-03-04
Attending: OBSTETRICS & GYNECOLOGY | Admitting: OBSTETRICS & GYNECOLOGY
Payer: COMMERCIAL

## 2019-03-04 VITALS
TEMPERATURE: 98.6 F | SYSTOLIC BLOOD PRESSURE: 125 MMHG | HEART RATE: 74 BPM | DIASTOLIC BLOOD PRESSURE: 65 MMHG | WEIGHT: 115 LBS | RESPIRATION RATE: 20 BRPM | BODY MASS INDEX: 22.58 KG/M2 | HEIGHT: 60 IN | OXYGEN SATURATION: 100 %

## 2019-03-04 DIAGNOSIS — N83.201 RIGHT OVARIAN CYST: ICD-10-CM

## 2019-03-04 DIAGNOSIS — Z90.721 S/P RIGHT OOPHORECTOMY: Primary | ICD-10-CM

## 2019-03-04 LAB — EXT PREGNANCY TEST URINE: NEGATIVE

## 2019-03-04 PROCEDURE — 58661 LAPAROSCOPY REMOVE ADNEXA: CPT | Performed by: OBSTETRICS & GYNECOLOGY

## 2019-03-04 PROCEDURE — 88307 TISSUE EXAM BY PATHOLOGIST: CPT | Performed by: PATHOLOGY

## 2019-03-04 PROCEDURE — 81025 URINE PREGNANCY TEST: CPT | Performed by: OBSTETRICS & GYNECOLOGY

## 2019-03-04 RX ORDER — SODIUM CHLORIDE 9 MG/ML
125 INJECTION, SOLUTION INTRAVENOUS CONTINUOUS
Status: DISCONTINUED | OUTPATIENT
Start: 2019-03-04 | End: 2019-03-04 | Stop reason: HOSPADM

## 2019-03-04 RX ORDER — ONDANSETRON 2 MG/ML
4 INJECTION INTRAMUSCULAR; INTRAVENOUS ONCE AS NEEDED
Status: DISCONTINUED | OUTPATIENT
Start: 2019-03-04 | End: 2019-03-04 | Stop reason: HOSPADM

## 2019-03-04 RX ORDER — PROPOFOL 10 MG/ML
INJECTION, EMULSION INTRAVENOUS AS NEEDED
Status: DISCONTINUED | OUTPATIENT
Start: 2019-03-04 | End: 2019-03-04 | Stop reason: SURG

## 2019-03-04 RX ORDER — MIDAZOLAM HYDROCHLORIDE 1 MG/ML
INJECTION INTRAMUSCULAR; INTRAVENOUS AS NEEDED
Status: DISCONTINUED | OUTPATIENT
Start: 2019-03-04 | End: 2019-03-04 | Stop reason: SURG

## 2019-03-04 RX ORDER — OXYCODONE HYDROCHLORIDE 5 MG/1
5 TABLET ORAL EVERY 4 HOURS PRN
Status: DISCONTINUED | OUTPATIENT
Start: 2019-03-04 | End: 2019-03-04 | Stop reason: HOSPADM

## 2019-03-04 RX ORDER — NEOSTIGMINE METHYLSULFATE 1 MG/ML
INJECTION INTRAVENOUS AS NEEDED
Status: DISCONTINUED | OUTPATIENT
Start: 2019-03-04 | End: 2019-03-04 | Stop reason: SURG

## 2019-03-04 RX ORDER — GLYCOPYRROLATE 0.2 MG/ML
INJECTION INTRAMUSCULAR; INTRAVENOUS AS NEEDED
Status: DISCONTINUED | OUTPATIENT
Start: 2019-03-04 | End: 2019-03-04 | Stop reason: SURG

## 2019-03-04 RX ORDER — ACETAMINOPHEN 325 MG/1
975 TABLET ORAL EVERY 6 HOURS PRN
Status: DISCONTINUED | OUTPATIENT
Start: 2019-03-04 | End: 2019-03-04 | Stop reason: HOSPADM

## 2019-03-04 RX ORDER — SODIUM CHLORIDE, SODIUM LACTATE, POTASSIUM CHLORIDE, CALCIUM CHLORIDE 600; 310; 30; 20 MG/100ML; MG/100ML; MG/100ML; MG/100ML
125 INJECTION, SOLUTION INTRAVENOUS CONTINUOUS
Status: DISCONTINUED | OUTPATIENT
Start: 2019-03-04 | End: 2019-03-04 | Stop reason: HOSPADM

## 2019-03-04 RX ORDER — FENTANYL CITRATE/PF 50 MCG/ML
50 SYRINGE (ML) INJECTION
Status: DISCONTINUED | OUTPATIENT
Start: 2019-03-04 | End: 2019-03-04 | Stop reason: HOSPADM

## 2019-03-04 RX ORDER — ONDANSETRON 2 MG/ML
4 INJECTION INTRAMUSCULAR; INTRAVENOUS EVERY 6 HOURS PRN
Status: DISCONTINUED | OUTPATIENT
Start: 2019-03-04 | End: 2019-03-04 | Stop reason: HOSPADM

## 2019-03-04 RX ORDER — FENTANYL CITRATE 50 UG/ML
INJECTION, SOLUTION INTRAMUSCULAR; INTRAVENOUS AS NEEDED
Status: DISCONTINUED | OUTPATIENT
Start: 2019-03-04 | End: 2019-03-04 | Stop reason: SURG

## 2019-03-04 RX ORDER — OXYCODONE HYDROCHLORIDE 5 MG/1
10 TABLET ORAL EVERY 4 HOURS PRN
Status: DISCONTINUED | OUTPATIENT
Start: 2019-03-04 | End: 2019-03-04 | Stop reason: HOSPADM

## 2019-03-04 RX ORDER — EPHEDRINE SULFATE 50 MG/ML
INJECTION, SOLUTION INTRAVENOUS AS NEEDED
Status: DISCONTINUED | OUTPATIENT
Start: 2019-03-04 | End: 2019-03-04 | Stop reason: SURG

## 2019-03-04 RX ORDER — ROCURONIUM BROMIDE 10 MG/ML
INJECTION, SOLUTION INTRAVENOUS AS NEEDED
Status: DISCONTINUED | OUTPATIENT
Start: 2019-03-04 | End: 2019-03-04 | Stop reason: SURG

## 2019-03-04 RX ORDER — OXYCODONE HYDROCHLORIDE AND ACETAMINOPHEN 5; 325 MG/1; MG/1
1 TABLET ORAL EVERY 4 HOURS PRN
Qty: 15 TABLET | Refills: 0 | Status: SHIPPED | OUTPATIENT
Start: 2019-03-04 | End: 2019-03-14

## 2019-03-04 RX ADMIN — EPHEDRINE SULFATE 10 MG: 50 INJECTION, SOLUTION INTRAMUSCULAR; INTRAVENOUS; SUBCUTANEOUS at 13:48

## 2019-03-04 RX ADMIN — PROPOFOL 200 MG: 10 INJECTION, EMULSION INTRAVENOUS at 13:21

## 2019-03-04 RX ADMIN — SODIUM CHLORIDE: 0.9 INJECTION, SOLUTION INTRAVENOUS at 13:48

## 2019-03-04 RX ADMIN — ROCURONIUM BROMIDE 40 MG: 10 INJECTION INTRAVENOUS at 13:21

## 2019-03-04 RX ADMIN — SODIUM CHLORIDE 125 ML/HR: 0.9 INJECTION, SOLUTION INTRAVENOUS at 12:26

## 2019-03-04 RX ADMIN — SODIUM CHLORIDE 125 ML/HR: 0.9 INJECTION, SOLUTION INTRAVENOUS at 10:09

## 2019-03-04 RX ADMIN — DEXAMETHASONE SODIUM PHOSPHATE 8 MG: 10 INJECTION INTRAMUSCULAR; INTRAVENOUS at 13:31

## 2019-03-04 RX ADMIN — SODIUM CHLORIDE: 0.9 INJECTION, SOLUTION INTRAVENOUS at 15:02

## 2019-03-04 RX ADMIN — LIDOCAINE HYDROCHLORIDE 40 MG: 20 INJECTION, SOLUTION INTRAVENOUS at 13:43

## 2019-03-04 RX ADMIN — FENTANYL CITRATE 50 MCG: 50 INJECTION, SOLUTION INTRAMUSCULAR; INTRAVENOUS at 14:18

## 2019-03-04 RX ADMIN — ROCURONIUM BROMIDE 10 MG: 10 INJECTION INTRAVENOUS at 14:19

## 2019-03-04 RX ADMIN — MIDAZOLAM 2 MG: 1 INJECTION INTRAMUSCULAR; INTRAVENOUS at 13:17

## 2019-03-04 RX ADMIN — FENTANYL CITRATE 100 MCG: 50 INJECTION, SOLUTION INTRAMUSCULAR; INTRAVENOUS at 13:21

## 2019-03-04 RX ADMIN — FENTANYL CITRATE 50 MCG: 50 INJECTION, SOLUTION INTRAMUSCULAR; INTRAVENOUS at 15:17

## 2019-03-04 RX ADMIN — EPHEDRINE SULFATE 10 MG: 50 INJECTION, SOLUTION INTRAMUSCULAR; INTRAVENOUS; SUBCUTANEOUS at 13:51

## 2019-03-04 RX ADMIN — GLYCOPYRROLATE 0.5 MG: 0.2 INJECTION, SOLUTION INTRAMUSCULAR; INTRAVENOUS at 14:58

## 2019-03-04 RX ADMIN — NEOSTIGMINE METHYLSULFATE 3 MG: 1 INJECTION INTRAVENOUS at 14:58

## 2019-03-04 NOTE — ANESTHESIA PREPROCEDURE EVALUATION
Review of Systems/Medical History  Patient summary reviewed  Chart reviewed      Cardiovascular  Hypertension , Valvular heart disease , mitral regurgitation,    Pulmonary  Negative pulmonary ROS        GI/Hepatic    Bariatric surgery,   Comment: Hx fatty liver prior to bariatric surgery          Endo/Other     GYN  Negative gynecology ROS          Hematology  Anemia ,     Musculoskeletal  Negative musculoskeletal ROS        Neurology  Negative neurology ROS      Psychology   Anxiety, Depression ,              Physical Exam    Airway    Mallampati score: II  TM Distance: >3 FB  Neck ROM: full     Dental   No notable dental hx     Cardiovascular  Rhythm: regular, Rate: normal, Cardiovascular exam normal    Pulmonary  Breath sounds clear to auscultation,     Other Findings        Anesthesia Plan  ASA Score- 2     Anesthesia Type- general with ASA Monitors  Additional Monitors:   Airway Plan: ETT  Plan Factors-    Induction- intravenous  Postoperative Plan- Plan for postoperative opioid use  Informed Consent- Anesthetic plan and risks discussed with patient

## 2019-03-04 NOTE — OP NOTE
OPERATIVE REPORT  PATIENT NAME: William Brantley    :  1980  MRN: [id number]  Pt Location: [facility] 05    SURGERY DATE: 3/4/2019    Surgeon(s) and Role:     * Jaden Coyle MD - Primary     * Ramon Slater DO - Assisting    Preop Diagnosis:  Right ovarian cyst [N83 201]    Post-Op Diagnosis Codes:     * Right ovarian cyst [N83 201]    Procedure(s) (LRB):  CYSTECTOMY OVARIAN, LAPAROSCOPIC (Right)    Specimen(s):  ID Type Source Tests Collected by Time Destination   1 : right ovary, tube, and cyst Tissue Ovary, Right TISSUE EXAM Jaden Coyle MD 3/4/2019 1438        Estimated Blood Loss:   Minimal    Drains:  50cc clear yellow urine    Anesthesia Type:   General    Operative Indications:  Right ovarian cyst [N83 201]    Operative Findings:  Right 6cm dermoid cyst, inseparable from right ovary  Grossly normal appearing left ovary  Evidence of bilateral tubal ligation prior  Grossly normal appearing uterus    OPERATIVE DICTATION  Brief History    All risks, benefits, and alternatives to the procedure were discussed with the patient and she had the opportunity to ask questions  Description of Procedure    Patient was taken to the operating room were a time out was performed to confirm correct patient and correct procedure  General endotracheal anesthesia (GET) was administered and the patient was positioned on the OR table in the dorsal lithotomy position  All pressure points were padded and a clement hugger was placed to maintain control of core body temperature  A bimanual exam was performed and the uterus was noted to be anteverted, normal in size and consistency with palpable fullness and a circumscribed mass in the right lower quadrant  The patient was prepped and draped in the usual sterile fashion with chloroprep on the abdomen and betadine prep on the vagina and perineum  Operative Technique    A straight catheter was introduced into the bladder, which was drained of 50cc of clear yellow urine   A weighted speculum was inserted into the vagina and used to visualize the anterior lip of the cervix, which was then grasped with a single toothed tenaculum  A cone uterine manipulator was inserted into the cervix and secured to the tenaculum  The speculum was removed from the vagina  Sterile gloves were then exchanged and attention was turned to the abdomen  A 5mm incision was made at the inferior edge of the umbilicus for introduction of a 5mm trocar  Trocar was introduced under direct visualization  Pneumoperitoneum was then established to a maximum of 15mmHg  The entire abdomen and pelvis was inspected and there was no evidence of injury to bowel, bladder, vasculature, or other structures  Attention was then turned to the pelvis  Patient was placed in Trendelenburg and the uterus was elevated  A large right ovarian mass (approximately 6cm x 5xm) was visible and appeared consistent with prior ultrasound findings  There did not appear to be any distinct border between the right ovary and right ovarian cyst  The uterus appeared grossly normal and there was evidence of prior surgery (bilateral tubal ligation)  The left ovary appeared normal in size and shape  A second port site was selected 2cm cephalad and 2cm medial to the right ASIS  A 5mm incision was made for introduction of a 5mm trocar under direct visualization  A third port site was selected 2cm cephalad and 2cm medial to the contralateral ASIS  A blunt probe was inserted through this port and used to visualize the left and right ovaries further  The right fimbriae was grasped, lifting the right cyst and ovary upward  The right ureter was visualized coursing underneath and lateral along the pelvic sidewall  The Enseal device was utilized to separate the right ovary and ovarian cyst from the sidewall by cauterizing and ligating the right infundibulopelvic ligament   The ovarian cyst was freed and there was noted to be some slight bleeding along the broad ligament  Kleppinger foreceps were used to carefully ligate these areas and adequate hemostasis was achieved  The right trocar site was replaced by a 10mm trocar site using a scalpel for insertion of the endocatch bag  The ovarian mass was captured within the bag and drained with a laparoscopic needle in the abdomen  This allowed the ovarian mass to be decompressed and fully collapse within the bag  Fluid was sent for pathology  The bag was pulled up to the right trocar  The 10mm skin incision was lengthened to 20mm  The ovarian cyst wall was grasped with Kellys and pulled outwards  This was performed in a piecemeal fashion until the entire specimen and bag delivered through the skin incision  Following removal of the mass, pneumoperitoneum was allowed to escape  Adequate hemostasis was visualized  The inferior  trocar was removed under direct visualization  The laparoscope was withdrawn from the abdomen, followed by its trocar sleeve at the umbilicus  The 20mm right trocar was removed and the fascia was closed with S retractors and 0-vicryl on a UR-6 needle in a running fashion  The umbilical and left trocar ports were closed with 4-0 monocryl in a running fashion and histoacryl  For post-operative pain control, 10mL of 0 1% lidocaine was injected subdermally into the superficial skin incision  Attention was turned to the vagina  Weighted speculum was reinserted into the vagina and the uterine manipulator was withdrawn  Single toothed tenaculum was removed from the anterior lip of the cervix  Good hemostasis was confirmed at the tenaculum puncture sites  Speculum was then removed from the vagina  At the conclusion of the procedure, all needle, sponge, and instrument counts were noted to be correct x2  Patient tolerated the procedure well and was transferred to PACU in stable condition prior to discharge with follow up in 1-2 weeks       Dr Eric Madison was present and participated in all davila portions of the case      Seirra Castillo, DO  PGY-2 OB/GYN   3/4/2019 4:11 PM

## 2019-03-04 NOTE — NURSING NOTE
Three trochar sites on lower abdomen  All clean, dry and intact  Closed with histocryl  Ice pack applied

## 2019-03-04 NOTE — DISCHARGE INSTRUCTIONS
Laparoscopic Excision of Ovarian Cysts   WHAT YOU NEED TO KNOW:   Laparoscopic excision is surgery to remove a cyst on your ovary  DISCHARGE INSTRUCTIONS:   Medicines:   · Antibiotics: This medicine is given to fight or prevent an infection caused by bacteria  Always take your antibiotics exactly as ordered by your healthcare provider  Do not stop taking your medicine unless directed by your healthcare provider  Never save antibiotics or take leftover antibiotics that were given to you for another illness  · Pain medicine: You may be given a prescription medicine to decrease pain  Do not wait until the pain is severe before you take this medicine  · Take your medicine as directed  Contact your healthcare provider if you think your medicine is not helping or if you have side effects  Tell him or her if you are allergic to any medicine  Keep a list of the medicines, vitamins, and herbs you take  Include the amounts, and when and why you take them  Bring the list or the pill bottles to follow-up visits  Carry your medicine list with you in case of an emergency  Follow up with your healthcare provider as directed:  Write down your questions so you remember to ask them during your visits  Contact your healthcare provider if:   · You have stomach cramps  · You have a fever  · You have questions or concerns about your condition or care  Seek care immediately or call 911 if:   · You have bleeding that does not stop  · You have severe abdominal pain that does not go away, even with medicine  · You feel lightheaded and short of breath  · You have chest pain when you take a deep breath or cough, or you cough up blood  · Your arm or leg feels warm, tender, and painful  It may look swollen and red  © 2017 [employer] Information is for End User's use only and may not be sold, redistributed or otherwise used for commercial purposes   All illustrations and images included in CareNotes® are the copyrighted property of A D [employer] M [employer] Inc  or [employer]  The above information is an  only  It is not intended as medical advice for individual conditions or treatments  Talk to your doctor, nurse or pharmacist before following any medical regimen to see if it is safe and effective for you  Ovarian Cyst   WHAT YOU NEED TO KNOW:   An ovarian cyst is a sac that grows on an ovary  This sac usually contains fluid, but may sometimes have blood or tissue in it  Most ovarian cysts are harmless and go away without treatment in a few months  Some cysts can grow large, cause pain, or break open  DISCHARGE INSTRUCTIONS:   Call 911 for any of the following:   · You are too weak or dizzy to stand up  Seek care immediately if:   · You have severe abdominal pain  The pain may be sharp and sudden  · You have a fever  Contact your healthcare provider if:   · Your periods are early, late, or more painful than usual     · You have bleeding from your vagina that is not your period  · You have abdominal pain all the time  · Your abdomen is swollen  · You have feelings of fullness, pressure, or discomfort in your abdomen  · You have trouble urinating or emptying your bladder completely  · You have pain during sex  · You are losing weight without trying  · You have questions or concerns about your condition or care  Medicines: You may need any of the following:  · NSAIDs , such as ibuprofen, help decrease swelling, pain, and fever  This medicine is available with or without a doctor's order  NSAIDs can cause stomach bleeding or kidney problems in certain people  If you take blood thinner medicine, always ask if NSAIDs are safe for you  Always read the medicine label and follow directions  Do not give these medicines to children under 10months of age without direction from your child's healthcare provider       · Birth control pills  may help to control your periods, prevent cysts, or cause them to shrink  · Take your medicine as directed  Contact your healthcare provider if you think your medicine is not helping or if you have side effects  Tell him or her if you are allergic to any medicine  Keep a list of the medicines, vitamins, and herbs you take  Include the amounts, and when and why you take them  Bring the list or the pill bottles to follow-up visits  Carry your medicine list with you in case of an emergency  Follow up with your healthcare provider as directed:  Write down your questions so you remember to ask them during your visits  Apply heat to decrease pain and cramping:  Sit in a warm bath, or place a heating pad (turned on low) or a hot water bottle on your abdomen  Do this for 15 to 20 minutes every hour for as many days as directed  © 2017 [address] [employer] Information is for End User's use only and may not be sold, redistributed or otherwise used for commercial purposes  All illustrations and images included in CareNotes® are the copyrighted property of [employer] M [employer] Inc  or Gavino Vera  The above information is an  only  It is not intended as medical advice for individual conditions or treatments  Talk to your doctor, nurse or pharmacist before following any medical regimen to see if it is safe and effective for you

## 2019-03-05 ENCOUNTER — TELEPHONE (OUTPATIENT)
Dept: OBGYN CLINIC | Facility: MEDICAL CENTER | Age: 39
End: 2019-03-05

## 2019-03-05 NOTE — TELEPHONE ENCOUNTER
Lap  Ovarian cystectomy 3/4/19 with Dr Espinoza Cords that incisions are clean and dry  Pain well controlled   F/u appointment scheduled

## 2019-03-12 ENCOUNTER — OFFICE VISIT (OUTPATIENT)
Dept: OBGYN CLINIC | Facility: MEDICAL CENTER | Age: 39
End: 2019-03-12

## 2019-03-12 VITALS — BODY MASS INDEX: 22.79 KG/M2 | SYSTOLIC BLOOD PRESSURE: 122 MMHG | WEIGHT: 116.7 LBS | DIASTOLIC BLOOD PRESSURE: 64 MMHG

## 2019-03-12 DIAGNOSIS — Z09 POSTOP CHECK: Primary | ICD-10-CM

## 2019-03-12 PROCEDURE — 99024 POSTOP FOLLOW-UP VISIT: CPT | Performed by: OBSTETRICS & GYNECOLOGY

## 2019-03-12 NOTE — LETTER
March 12, 2019     Patient: Julio César Cabral   YOB: 1980   Date of Visit: 3/12/2019       To Whom it May Concern:    Siobhan Galvez is under my professional care  She was seen in my office on 3/12/2019  She may return to work on 3/18/2019  If you have any questions or concerns, please don't hesitate to call           Sincerely,          Maya Field MD

## 2019-03-12 NOTE — PROGRESS NOTES
Assessment Lb Baker was seen today for post-op  Diagnoses and all orders for this visit:    Postop check         Plan  Patient is doing well  Pt to f/u in 9 months for routine gyn exam     Waldron Riedel is a 43 y o  female here for a postop visit  Patient had a laproscopic RSO on 3/4/19  Patient is complaining of some soreness along her incisions  Patient is still taking Tylenol for the pain  Patient never needed to take the Percocet  No bowel or bladder complaints  Patient Active Problem List   Diagnosis    Allergic rhinitis    Depression    Herpes simplex infection    Low vitamin B12 level    Postsurgical malabsorption    Right ovarian cyst    Secondary hyperparathyroidism ([facility] [address] )    Bariatric surgery status    Vitamin A deficiency    Muscle spasm of back    S/P right oophorectomy       Gynecologic History  Patient's last menstrual period was 02/15/2019 (exact date)  The current method of family planning is tubal ligation  Past Medical History:   Diagnosis Date    Allergic rhinitis 2013    Anemia     Anxiety     Depression     Fatty liver     Herpes     Last outbreak was within the past year but she doesn't remember when    Hypertension     Resolved after bariatric surgery    Mild heartburn     resolved    Mitral regurgitation     Murmur, cardiac     "slight" since childhood    Ovarian cyst     Pneumonia 2003    x1- double pneumonia and was hospitalized    Transaminitis     liver enzymes elevated prior to gastric bypass    Tricuspid regurgitation      Past Surgical History:   Procedure Laterality Date     SECTION      x1    ENDOMETRIAL ABLATION      ESOPHAGOGASTRODUODENOSCOPY N/A 2017    Procedure: ESOPHAGOGASTRODUODENOSCOPY (EGD); Surgeon: Benji Snyder MD;  Location: [facility];  Service:    [address] Right 3/4/2019    Procedure: CYSTECTOMY OVARIAN, LAPAROSCOPIC;  Surgeon:  Polly Ricks MD;  [facility];  Service: Gynecology    ND EGD TRANSORAL BIOPSY SINGLE/MULTIPLE N/A 3/1/2017    Procedure: ESOPHAGOGASTRODUODENOSCOPY (EGD) with gastric bx;  Surgeon: Dalton Herrera MD;  Location: [facility];   Service: Bariatrics    ND LAP GASTRIC BYPASS/EMILIA-EN-Y N/A 5/30/2017    Procedure: BYPASS GASTRIC  EMILIA-EN-Y LAPAROSCOPIC;  Surgeon: Dalton Herrera MD;  [facility];  Service: Bariatrics    TUBAL LIGATION       Family History   Problem Relation Age of Onset    Anxiety disorder Mother     Depression Mother     Leukemia Mother     Hypertension Father     Coronary artery disease Maternal Grandmother     Stroke Maternal Grandfather      Social History     Socioeconomic History    Marital status: /Civil Union     Spouse name: Not on file    Number of children: Not on file    Years of education: Not on file    Highest education level: Not on file   Occupational History    Not on file   Social Needs    Financial resource strain: Not on file    Food insecurity:     Worry: Not on file     Inability: Not on file    Transportation needs:     Medical: Not on file     Non-medical: Not on file   Tobacco Use    Smoking status: Never Smoker    Smokeless tobacco: Never Used   Substance and Sexual Activity    Alcohol use: Yes     Frequency: Monthly or less     Drinks per session: 1 or 2     Binge frequency: Never     Comment: 3 monthly    Drug use: No    Sexual activity: Yes     Partners: Male     Birth control/protection: Female Sterilization   Lifestyle    Physical activity:     Days per week: Not on file     Minutes per session: Not on file    Stress: Not on file   Relationships    Social connections:     Talks on phone: Not on file     Gets together: Not on file     Attends [employer] service: Not on file     Active member of club or organization: Not on file     Attends meetings of clubs or organizations: Not on file     Relationship status: Not on file    Intimate partner violence:     Fear of current or ex partner: Not on file     Emotionally abused: Not on file     Physically abused: Not on file     Forced sexual activity: Not on file   Other Topics Concern    Not on file   Social History Narrative    No secondhand smoke exposure     Allergies   Allergen Reactions    Nsaids Other (See Comments)     Has had bariatric surgery and can't have      Wellbutrin [Bupropion] Other (See Comments) and Hyperactivity     "felt like she could climb the wall"       Current Outpatient Medications:     acetaminophen (TYLENOL) 500 mg tablet, Take 500 mg by mouth every 6 (six) hours as needed for mild pain, Disp: , Rfl:     Calcium Citrate-Vitamin D (UPCAL D) 500-500 MG-UNT/5GM POWD, Take by mouth, Disp: , Rfl:     cholecalciferol (VITAMIN D3) 1,000 units tablet, Take by mouth daily, Disp: , Rfl:     FLUoxetine (PROzac) 20 mg capsule, TAKE ONE CAPSULE BY MOUTH DAILY (Patient taking differently: TAKE ONE CAPSULE BY MOUTH DAILY IN AM), Disp: 90 capsule, Rfl: 1    fluticasone (FLONASE) 50 mcg/act nasal spray, 2 sprays into each nostril as needed , Disp: , Rfl:     IRON PO, Take by mouth daily , Disp: , Rfl:     Iron-Vitamin C (IRON 100/C) 100-250 MG TABS, Take by mouth, Disp: , Rfl:     Multiple Vitamins-Minerals (BARIATRIC FUSION) CHEW, Chew, Disp: , Rfl:     vitamin E, tocopherol, 400 units capsule, Take 400 Units by mouth daily, Disp: , Rfl:     oxyCODONE-acetaminophen (PERCOCET) 5-325 mg per tablet, Take 1 tablet by mouth every 4 (four) hours as needed for moderate pain or severe pain for up to 10 daysMax Daily Amount: 6 tablets (Patient not taking: Reported on 3/12/2019), Disp: 15 tablet, Rfl: 0    valACYclovir (VALTREX) 500 mg tablet, Take 1 tablet (500 mg total) by mouth daily (Patient taking differently: Take 500 mg by mouth as needed ), Disp: 90 tablet, Rfl: 3    Review of Systems  Constitutional :no fever, feels well, no tiredness, no recent weight gain or loss  ENT: no ear ache, no loss of hearing, no nosebleeds or nasal discharge, no sore throat or hoarseness  Cardiovascular: no complaints of slow or fast heart beat, no chest pain, no palpitations, no leg claudication or lower extremity edema  Respiratory: no complaints of shortness of shortness of breath, no COFFEY  Breasts:no complaints of breast pain, breast lump, or nipple discharge  Gastrointestinal: no complaints of abdominal pain, constipation, nausea, vomiting, or diarrhea or bloody stools  Genitourinary : no complaints of dysuria, incontinence, pelvic pain, no dysmenorrhea, vaginal discharge or abnormal vaginal bleeding and as noted in HPI  Musculoskeletal: no complaints of arthralgia, no myalgia, no joint swelling or stiffness, no limb pain or swelling    Integumentary: no complaints of skin rash or lesion, itching or dry skin  Neurological: no complaints of headache, no confusion, no numbness or tingling, no dizziness or fainting     Objective     /64   Wt 52 9 kg (116 lb 11 2 oz)   LMP 02/15/2019 (Exact Date)   BMI 22 79 kg/m²                        General: Appears stated age, cooperative, alert normal mood and affect               Psychiatric:  orientation to person, place and time: normal  mood and affect: normal   Abdomen: soft, non-tender, without masses or organomegaly, incisions well-healing

## 2019-03-14 DIAGNOSIS — B00.9 HERPES SIMPLEX INFECTION: ICD-10-CM

## 2019-03-14 RX ORDER — VALACYCLOVIR HYDROCHLORIDE 500 MG/1
500 TABLET, FILM COATED ORAL DAILY
Qty: 90 TABLET | Refills: 3 | Status: SHIPPED | OUTPATIENT
Start: 2019-03-14 | End: 2020-03-10

## 2019-04-01 ENCOUNTER — OFFICE VISIT (OUTPATIENT)
Dept: URGENT CARE | Facility: MEDICAL CENTER | Age: 39
End: 2019-04-01
Payer: COMMERCIAL

## 2019-04-01 ENCOUNTER — APPOINTMENT (OUTPATIENT)
Dept: RADIOLOGY | Facility: MEDICAL CENTER | Age: 39
End: 2019-04-01
Payer: COMMERCIAL

## 2019-04-01 VITALS
RESPIRATION RATE: 18 BRPM | HEIGHT: 60 IN | DIASTOLIC BLOOD PRESSURE: 89 MMHG | BODY MASS INDEX: 22.97 KG/M2 | OXYGEN SATURATION: 100 % | HEART RATE: 74 BPM | TEMPERATURE: 98.2 F | SYSTOLIC BLOOD PRESSURE: 144 MMHG | WEIGHT: 117 LBS

## 2019-04-01 DIAGNOSIS — M79.675 TOE PAIN, LEFT: ICD-10-CM

## 2019-04-01 DIAGNOSIS — S92.515A CLOSED NONDISPLACED FRACTURE OF PROXIMAL PHALANX OF LESSER TOE OF LEFT FOOT, INITIAL ENCOUNTER: Primary | ICD-10-CM

## 2019-04-01 PROCEDURE — 99213 OFFICE O/P EST LOW 20 MIN: CPT | Performed by: FAMILY MEDICINE

## 2019-04-01 PROCEDURE — 73660 X-RAY EXAM OF TOE(S): CPT

## 2019-04-01 RX ORDER — HYDROCODONE BITARTRATE AND ACETAMINOPHEN 5; 325 MG/1; MG/1
1 TABLET ORAL EVERY 6 HOURS PRN
Qty: 20 TABLET | Refills: 0 | Status: SHIPPED | OUTPATIENT
Start: 2019-04-01 | End: 2020-11-03

## 2019-04-13 DIAGNOSIS — F32.A DEPRESSION, UNSPECIFIED DEPRESSION TYPE: ICD-10-CM

## 2019-04-13 RX ORDER — FLUOXETINE HYDROCHLORIDE 20 MG/1
CAPSULE ORAL
Qty: 90 CAPSULE | Refills: 1 | Status: SHIPPED | OUTPATIENT
Start: 2019-04-13 | End: 2019-10-07 | Stop reason: SDUPTHER

## 2019-10-07 DIAGNOSIS — F32.A DEPRESSION, UNSPECIFIED DEPRESSION TYPE: ICD-10-CM

## 2019-10-07 RX ORDER — FLUOXETINE HYDROCHLORIDE 20 MG/1
CAPSULE ORAL
Qty: 90 CAPSULE | Refills: 1 | Status: SHIPPED | OUTPATIENT
Start: 2019-10-07 | End: 2019-10-25 | Stop reason: SDUPTHER

## 2019-10-07 NOTE — TELEPHONE ENCOUNTER
Patient's Prozac was reordered but patient needs appoint with the next month for routine follow-up last seen 02/19

## 2019-10-25 ENCOUNTER — OFFICE VISIT (OUTPATIENT)
Dept: FAMILY MEDICINE CLINIC | Facility: CLINIC | Age: 39
End: 2019-10-25
Payer: COMMERCIAL

## 2019-10-25 VITALS
RESPIRATION RATE: 20 BRPM | WEIGHT: 123 LBS | SYSTOLIC BLOOD PRESSURE: 126 MMHG | HEART RATE: 68 BPM | BODY MASS INDEX: 24.15 KG/M2 | HEIGHT: 60 IN | DIASTOLIC BLOOD PRESSURE: 74 MMHG

## 2019-10-25 DIAGNOSIS — E50.9 VITAMIN A DEFICIENCY: ICD-10-CM

## 2019-10-25 DIAGNOSIS — K91.2 POSTSURGICAL MALABSORPTION: ICD-10-CM

## 2019-10-25 DIAGNOSIS — J30.9 ALLERGIC RHINITIS, UNSPECIFIED SEASONALITY, UNSPECIFIED TRIGGER: ICD-10-CM

## 2019-10-25 DIAGNOSIS — N25.81 SECONDARY HYPERPARATHYROIDISM (HCC): ICD-10-CM

## 2019-10-25 DIAGNOSIS — T14.8XXA ABRASION: ICD-10-CM

## 2019-10-25 DIAGNOSIS — Z12.31 ENCOUNTER FOR MAMMOGRAM TO ESTABLISH BASELINE MAMMOGRAM: ICD-10-CM

## 2019-10-25 DIAGNOSIS — F32.A DEPRESSION, UNSPECIFIED DEPRESSION TYPE: Primary | ICD-10-CM

## 2019-10-25 DIAGNOSIS — Z13.220 SCREENING FOR LIPID DISORDERS: ICD-10-CM

## 2019-10-25 DIAGNOSIS — Z00.00 HEALTH CARE MAINTENANCE: ICD-10-CM

## 2019-10-25 DIAGNOSIS — E53.8 LOW VITAMIN B12 LEVEL: ICD-10-CM

## 2019-10-25 PROCEDURE — 3008F BODY MASS INDEX DOCD: CPT | Performed by: FAMILY MEDICINE

## 2019-10-25 PROCEDURE — 99214 OFFICE O/P EST MOD 30 MIN: CPT | Performed by: FAMILY MEDICINE

## 2019-10-25 PROCEDURE — 90715 TDAP VACCINE 7 YRS/> IM: CPT

## 2019-10-25 PROCEDURE — 90471 IMMUNIZATION ADMIN: CPT

## 2019-10-25 RX ORDER — FLUOXETINE HYDROCHLORIDE 40 MG/1
40 CAPSULE ORAL DAILY
Qty: 90 CAPSULE | Refills: 3 | Status: SHIPPED | OUTPATIENT
Start: 2019-10-25 | End: 2020-11-03 | Stop reason: SDUPTHER

## 2019-10-25 NOTE — PATIENT INSTRUCTIONS
Increase Prozac to 40 mg daily  If symptoms not controlled the next 2-3 weeks return to office    Return in 6 months complete blood work as ordered

## 2019-10-25 NOTE — ASSESSMENT & PLAN NOTE
With increased stress will increase Prozac to 40 mg daily    Patient to return in 2-3 weeks if symptoms not controlled

## 2019-10-25 NOTE — PROGRESS NOTES
Assessment and Plan:  1  Depression, increased stress increase Prozac to 40 mg daily  Return in 2-3 weeks if symptomatology is not fully controlled  2  Vitamin-D deficiency, blood work ordered  3  B12 deficiency, blood work ordered  4  Allergic rhinitis patient is a Flonase  5  Secondary hyperparathyroidism blood work ordered patient follow-up bariatric specialist  6  Postsurgical mild motion status post gastric bypass blood work ordered  7  Health care maintenance, Adacel given  Blood work to include lipids  8  Patient return in 6 months, sooner if needed        Problem List Items Addressed This Visit        Digestive    Postsurgical malabsorption      Blood work ordered         Relevant Orders    CBC    Comprehensive metabolic panel    Lipid Panel with Direct LDL reflex    TSH, 3rd generation with Free T4 reflex    Urinalysis with reflex to microscopic    Vitamin B12    Folate    Vitamin D 25 hydroxy    PTH, intact    Vitamin A       Endocrine    Secondary hyperparathyroidism ([address] )      Blood work ordered patient follow up with bariatric specialist         Relevant Orders    CBC    Comprehensive metabolic panel    Lipid Panel with Direct LDL reflex    TSH, 3rd generation with Free T4 reflex    Urinalysis with reflex to microscopic    Vitamin B12    Folate    Vitamin D 25 hydroxy    PTH, intact    Vitamin A       Respiratory    Allergic rhinitis      Using Flonase         Relevant Orders    CBC    Comprehensive metabolic panel    Lipid Panel with Direct LDL reflex    TSH, 3rd generation with Free T4 reflex    Urinalysis with reflex to microscopic    Vitamin B12    Folate    Vitamin D 25 hydroxy    PTH, intact    Vitamin A       Other    Depression - Primary      With increased stress will increase Prozac to 40 mg daily    Patient to return in 2-3 weeks if symptoms not controlled         Relevant Medications    FLUoxetine (PROzac) 40 MG capsule    Other Relevant Orders    CBC    Comprehensive metabolic panel Lipid Panel with Direct LDL reflex    TSH, 3rd generation with Free T4 reflex    Urinalysis with reflex to microscopic    Vitamin B12    Folate    Vitamin D 25 hydroxy    PTH, intact    Vitamin A    Low vitamin B12 level      Blood work ordered         Relevant Orders    CBC    Comprehensive metabolic panel    Lipid Panel with Direct LDL reflex    TSH, 3rd generation with Free T4 reflex    Urinalysis with reflex to microscopic    Vitamin B12    Folate    Vitamin D 25 hydroxy    PTH, intact    Vitamin A    Vitamin A deficiency      Blood work ordered         Relevant Orders    CBC    Comprehensive metabolic panel    Lipid Panel with Direct LDL reflex    TSH, 3rd generation with Free T4 reflex    Urinalysis with reflex to microscopic    Vitamin B12    Folate    Vitamin D 25 hydroxy    PTH, intact    Vitamin A    Health care maintenance      Adacel given today         Relevant Orders    CBC    Comprehensive metabolic panel    Lipid Panel with Direct LDL reflex    TSH, 3rd generation with Free T4 reflex    Urinalysis with reflex to microscopic    Vitamin B12    Folate    Vitamin D 25 hydroxy    PTH, intact    Vitamin A      Other Visit Diagnoses     Encounter for mammogram to establish baseline mammogram        Relevant Orders    Mammo screening bilateral w 3d & cad    Screening for lipid disorders        Relevant Orders    CBC    Comprehensive metabolic panel    Lipid Panel with Direct LDL reflex    TSH, 3rd generation with Free T4 reflex    Urinalysis with reflex to microscopic    Vitamin B12    Folate    Vitamin D 25 hydroxy    PTH, intact    Vitamin A                 Diagnoses and all orders for this visit:    Depression, unspecified depression type  -     FLUoxetine (PROzac) 40 MG capsule; Take 1 capsule (40 mg total) by mouth daily  -     CBC; Future  -     Comprehensive metabolic panel; Future  -     Lipid Panel with Direct LDL reflex; Future  -     TSH, 3rd generation with Free T4 reflex;  Future  - Urinalysis with reflex to microscopic; Future  -     Vitamin B12; Future  -     Folate; Future  -     Vitamin D 25 hydroxy; Future  -     PTH, intact; Future  -     Vitamin A; Future    Encounter for mammogram to establish baseline mammogram  -     Mammo screening bilateral w 3d & cad; Future    Vitamin A deficiency  -     CBC; Future  -     Comprehensive metabolic panel; Future  -     Lipid Panel with Direct LDL reflex; Future  -     TSH, 3rd generation with Free T4 reflex; Future  -     Urinalysis with reflex to microscopic; Future  -     Vitamin B12; Future  -     Folate; Future  -     Vitamin D 25 hydroxy; Future  -     PTH, intact; Future  -     Vitamin A; Future    Low vitamin B12 level  -     CBC; Future  -     Comprehensive metabolic panel; Future  -     Lipid Panel with Direct LDL reflex; Future  -     TSH, 3rd generation with Free T4 reflex; Future  -     Urinalysis with reflex to microscopic; Future  -     Vitamin B12; Future  -     Folate; Future  -     Vitamin D 25 hydroxy; Future  -     PTH, intact; Future  -     Vitamin A; Future    Allergic rhinitis, unspecified seasonality, unspecified trigger  -     CBC; Future  -     Comprehensive metabolic panel; Future  -     Lipid Panel with Direct LDL reflex; Future  -     TSH, 3rd generation with Free T4 reflex; Future  -     Urinalysis with reflex to microscopic; Future  -     Vitamin B12; Future  -     Folate; Future  -     Vitamin D 25 hydroxy; Future  -     PTH, intact; Future  -     Vitamin A; Future    Secondary hyperparathyroidism ([address] )  -     CBC; Future  -     Comprehensive metabolic panel; Future  -     Lipid Panel with Direct LDL reflex; Future  -     TSH, 3rd generation with Free T4 reflex; Future  -     Urinalysis with reflex to microscopic; Future  -     Vitamin B12; Future  -     Folate; Future  -     Vitamin D 25 hydroxy; Future  -     PTH, intact; Future  -     Vitamin A; Future    Postsurgical malabsorption  -     CBC;  Future  - Comprehensive metabolic panel; Future  -     Lipid Panel with Direct LDL reflex; Future  -     TSH, 3rd generation with Free T4 reflex; Future  -     Urinalysis with reflex to microscopic; Future  -     Vitamin B12; Future  -     Folate; Future  -     Vitamin D 25 hydroxy; Future  -     PTH, intact; Future  -     Vitamin A; Future    Health care maintenance  -     CBC; Future  -     Comprehensive metabolic panel; Future  -     Lipid Panel with Direct LDL reflex; Future  -     TSH, 3rd generation with Free T4 reflex; Future  -     Urinalysis with reflex to microscopic; Future  -     Vitamin B12; Future  -     Folate; Future  -     Vitamin D 25 hydroxy; Future  -     PTH, intact; Future  -     Vitamin A; Future    Screening for lipid disorders  -     CBC; Future  -     Comprehensive metabolic panel; Future  -     Lipid Panel with Direct LDL reflex; Future  -     TSH, 3rd generation with Free T4 reflex; Future  -     Urinalysis with reflex to microscopic; Future  -     Vitamin B12; Future  -     Folate; Future  -     Vitamin D 25 hydroxy; Future  -     PTH, intact; Future  -     Vitamin A; Future              Subjective:      Patient ID: Magdiel Phillips is a 35 y o  female  CC:    Chief Complaint   Patient presents with    Follow-up     pt here for a follow up  R Chun    Anxiety     pt has been under alot of stress due to work, wants up medication  HPI:     Patient is having increasing stress at work and home  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  Patient is on Prozac 20 she feels improved but not all symptoms are controlled  Will increase to Prozac 40 mg  If patient not completely controlled in the next few weeks patient return to office  Blood work slip will be given to the patient  Patient is due for Adacel she wishes this today        The following portions of the patient's history were reviewed and updated as appropriate: allergies, current medications, past family history, past medical history, past social history, past surgical history and problem list       Review of Systems   Constitutional: Negative  HENT: Negative  Eyes: Negative  Respiratory: Negative  Cardiovascular: Negative  Gastrointestinal: Negative  Endocrine: Negative  Genitourinary: Negative  Musculoskeletal: Negative  Allergic/Immunologic: Negative  Neurological: Negative  Hematological: Negative  Psychiatric/Behavioral:          HPI         Data to review:       Objective:    Vitals:    10/25/19 0735   BP: 126/74   BP Location: Left arm   Patient Position: Sitting   Cuff Size: Standard   Pulse: 68   Resp: 20   Weight: 55 8 kg (123 lb)   Height: 5' (1 524 m)        Physical Exam   Constitutional: She is oriented to person, place, and time  She appears well-developed and well-nourished  HENT:   Head: Normocephalic  Mouth/Throat: Oropharynx is clear and moist    Eyes: Pupils are equal, round, and reactive to light  Conjunctivae and EOM are normal  No scleral icterus  Neck: Neck supple  No JVD present  Cardiovascular: Normal rate, regular rhythm and normal heart sounds  Pulmonary/Chest: Effort normal and breath sounds normal    Lymphadenopathy:     She has no cervical adenopathy  Neurological: She is alert and oriented to person, place, and time  No cranial nerve deficit  Skin: Skin is warm  Psychiatric: She has a normal mood and affect

## 2019-12-30 ENCOUNTER — APPOINTMENT (OUTPATIENT)
Dept: LAB | Facility: CLINIC | Age: 39
End: 2019-12-30
Payer: COMMERCIAL

## 2019-12-30 DIAGNOSIS — Z13.220 SCREENING FOR LIPID DISORDERS: ICD-10-CM

## 2019-12-30 DIAGNOSIS — E53.8 LOW VITAMIN B12 LEVEL: ICD-10-CM

## 2019-12-30 DIAGNOSIS — J30.9 ALLERGIC RHINITIS, UNSPECIFIED SEASONALITY, UNSPECIFIED TRIGGER: ICD-10-CM

## 2019-12-30 DIAGNOSIS — N25.81 SECONDARY HYPERPARATHYROIDISM (HCC): ICD-10-CM

## 2019-12-30 DIAGNOSIS — K91.2 POSTSURGICAL MALABSORPTION: ICD-10-CM

## 2019-12-30 DIAGNOSIS — F32.A DEPRESSION, UNSPECIFIED DEPRESSION TYPE: ICD-10-CM

## 2019-12-30 DIAGNOSIS — Z00.00 HEALTH CARE MAINTENANCE: ICD-10-CM

## 2019-12-30 DIAGNOSIS — E50.9 VITAMIN A DEFICIENCY: ICD-10-CM

## 2019-12-30 LAB
25(OH)D3 SERPL-MCNC: 28.8 NG/ML (ref 30–100)
ALBUMIN SERPL BCP-MCNC: 4.2 G/DL (ref 3.5–5)
ALP SERPL-CCNC: 116 U/L (ref 46–116)
ALT SERPL W P-5'-P-CCNC: 24 U/L (ref 12–78)
ANION GAP SERPL CALCULATED.3IONS-SCNC: 5 MMOL/L (ref 4–13)
AST SERPL W P-5'-P-CCNC: 16 U/L (ref 5–45)
BILIRUB SERPL-MCNC: 0.48 MG/DL (ref 0.2–1)
BILIRUB UR QL STRIP: NEGATIVE
BUN SERPL-MCNC: 8 MG/DL (ref 5–25)
CALCIUM SERPL-MCNC: 9.3 MG/DL (ref 8.3–10.1)
CHLORIDE SERPL-SCNC: 104 MMOL/L (ref 100–108)
CHOLEST SERPL-MCNC: 145 MG/DL (ref 50–200)
CLARITY UR: CLEAR
CO2 SERPL-SCNC: 27 MMOL/L (ref 21–32)
COLOR UR: YELLOW
CREAT SERPL-MCNC: 0.63 MG/DL (ref 0.6–1.3)
ERYTHROCYTE [DISTWIDTH] IN BLOOD BY AUTOMATED COUNT: 14 % (ref 11.6–15.1)
FOLATE SERPL-MCNC: 17.4 NG/ML (ref 3.1–17.5)
GFR SERPL CREATININE-BSD FRML MDRD: 113 ML/MIN/1.73SQ M
GLUCOSE P FAST SERPL-MCNC: 83 MG/DL (ref 65–99)
GLUCOSE UR STRIP-MCNC: NEGATIVE MG/DL
HCT VFR BLD AUTO: 38.3 % (ref 34.8–46.1)
HDLC SERPL-MCNC: 73 MG/DL
HGB BLD-MCNC: 12.1 G/DL (ref 11.5–15.4)
HGB UR QL STRIP.AUTO: NEGATIVE
KETONES UR STRIP-MCNC: NEGATIVE MG/DL
LDLC SERPL CALC-MCNC: 60 MG/DL (ref 0–100)
LEUKOCYTE ESTERASE UR QL STRIP: NEGATIVE
MCH RBC QN AUTO: 27 PG (ref 26.8–34.3)
MCHC RBC AUTO-ENTMCNC: 31.6 G/DL (ref 31.4–37.4)
MCV RBC AUTO: 86 FL (ref 82–98)
NITRITE UR QL STRIP: NEGATIVE
PH UR STRIP.AUTO: 7 [PH]
PLATELET # BLD AUTO: 375 THOUSANDS/UL (ref 149–390)
PMV BLD AUTO: 11.1 FL (ref 8.9–12.7)
POTASSIUM SERPL-SCNC: 3.7 MMOL/L (ref 3.5–5.3)
PROT SERPL-MCNC: 7.6 G/DL (ref 6.4–8.2)
PROT UR STRIP-MCNC: NEGATIVE MG/DL
PTH-INTACT SERPL-MCNC: 81.9 PG/ML (ref 18.4–80.1)
RBC # BLD AUTO: 4.48 MILLION/UL (ref 3.81–5.12)
SODIUM SERPL-SCNC: 136 MMOL/L (ref 136–145)
SP GR UR STRIP.AUTO: 1 (ref 1–1.03)
TRIGL SERPL-MCNC: 60 MG/DL
TSH SERPL DL<=0.05 MIU/L-ACNC: 1.58 UIU/ML (ref 0.36–3.74)
UROBILINOGEN UR QL STRIP.AUTO: 0.2 E.U./DL
VIT B12 SERPL-MCNC: 322 PG/ML (ref 100–900)
WBC # BLD AUTO: 3.69 THOUSAND/UL (ref 4.31–10.16)

## 2019-12-30 PROCEDURE — 80053 COMPREHEN METABOLIC PANEL: CPT

## 2019-12-30 PROCEDURE — 83970 ASSAY OF PARATHORMONE: CPT

## 2019-12-30 PROCEDURE — 82746 ASSAY OF FOLIC ACID SERUM: CPT

## 2019-12-30 PROCEDURE — 36415 COLL VENOUS BLD VENIPUNCTURE: CPT

## 2019-12-30 PROCEDURE — 84443 ASSAY THYROID STIM HORMONE: CPT

## 2019-12-30 PROCEDURE — 82306 VITAMIN D 25 HYDROXY: CPT

## 2019-12-30 PROCEDURE — 84590 ASSAY OF VITAMIN A: CPT

## 2019-12-30 PROCEDURE — 85027 COMPLETE CBC AUTOMATED: CPT

## 2019-12-30 PROCEDURE — 80061 LIPID PANEL: CPT

## 2019-12-30 PROCEDURE — 82607 VITAMIN B-12: CPT

## 2019-12-30 PROCEDURE — 81003 URINALYSIS AUTO W/O SCOPE: CPT

## 2020-01-04 LAB — VIT A SERPL-MCNC: 37.3 UG/DL (ref 20.1–62)

## 2020-03-05 ENCOUNTER — OFFICE VISIT (OUTPATIENT)
Dept: URGENT CARE | Facility: MEDICAL CENTER | Age: 40
End: 2020-03-05
Payer: COMMERCIAL

## 2020-03-05 VITALS
WEIGHT: 130.29 LBS | TEMPERATURE: 98.3 F | BODY MASS INDEX: 25.58 KG/M2 | HEIGHT: 60 IN | DIASTOLIC BLOOD PRESSURE: 90 MMHG | HEART RATE: 67 BPM | OXYGEN SATURATION: 100 % | RESPIRATION RATE: 16 BRPM | SYSTOLIC BLOOD PRESSURE: 146 MMHG

## 2020-03-05 DIAGNOSIS — J02.9 ACUTE VIRAL PHARYNGITIS: Primary | ICD-10-CM

## 2020-03-05 LAB — S PYO AG THROAT QL: NEGATIVE

## 2020-03-05 PROCEDURE — 87880 STREP A ASSAY W/OPTIC: CPT | Performed by: PREVENTIVE MEDICINE

## 2020-03-05 PROCEDURE — 99213 OFFICE O/P EST LOW 20 MIN: CPT | Performed by: PREVENTIVE MEDICINE

## 2020-03-05 PROCEDURE — 87070 CULTURE OTHR SPECIMN AEROBIC: CPT | Performed by: PREVENTIVE MEDICINE

## 2020-03-05 NOTE — PROGRESS NOTES
[facility]        NAME: Ilan Michael is a 35 y o  female  : 1980    MRN: [id number]  DATE: 2020  TIME: 8:55 AM    Assessment and Plan   Acute viral pharyngitis [J02 8, B97 89]  1  Acute viral pharyngitis           Patient Instructions       Follow up with PCP in 3-5 days  Proceed to  ER if symptoms worsen  Chief Complaint     Chief Complaint   Patient presents with    Cold Like Symptoms     Patient relates started with a sore throat  and runny nose x1 week ago  Denies fever  Today started with cough  + headache  History of Present Illness       Sore throat x2 days  Mild head congestion  No fever  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        Review of Systems   Review of Systems   Constitutional: Negative for fever  HENT: Positive for congestion and sore throat  Respiratory: Negative for cough            Current Medications       Current Outpatient Medications:     acetaminophen (TYLENOL) 500 mg tablet, Take 500 mg by mouth every 6 (six) hours as needed for mild pain, Disp: , Rfl:     Calcium Citrate-Vitamin D (UPCAL D) 500-500 MG-UNT/5GM POWD, Take by mouth, Disp: , Rfl:     cholecalciferol (VITAMIN D3) 1,000 units tablet, Take by mouth daily, Disp: , Rfl:     FLUoxetine (PROzac) 40 MG capsule, Take 1 capsule (40 mg total) by mouth daily, Disp: 90 capsule, Rfl: 3    fluticasone (FLONASE) 50 mcg/act nasal spray, 2 sprays into each nostril as needed , Disp: , Rfl:     IRON PO, Take by mouth daily , Disp: , Rfl:     Iron-Vitamin C (IRON 100/C) 100-250 MG TABS, Take by mouth, Disp: , Rfl:     Multiple Vitamins-Minerals (BARIATRIC FUSION) CHEW, Chew, Disp: , Rfl:     valACYclovir (VALTREX) 500 mg tablet, Take 1 tablet (500 mg total) by mouth daily, Disp: 90 tablet, Rfl: 3    vitamin E, tocopherol, 400 units capsule, Take 400 Units by mouth daily, Disp: , Rfl:     HYDROcodone-acetaminophen (NORCO) 5-325 mg per tablet, Take 1 tablet by mouth every 6 (six) hours as needed for painMax Daily Amount: 4 tablets (Patient not taking: Reported on 3/5/2020), Disp: 20 tablet, Rfl: 0    Current Allergies     Allergies as of 2020 - Reviewed 2020   Allergen Reaction Noted    Nsaids Other (See Comments) 2019    Wellbutrin [bupropion] Other (See Comments) and Hyperactivity 2016            The following portions of the patient's history were reviewed and updated as appropriate: allergies, current medications, past family history, past medical history, past social history, past surgical history and problem list      Past Medical History:   Diagnosis Date    Allergic rhinitis 2013    Anemia     Anxiety     Depression     Fatty liver     Herpes     Last outbreak was within the past year but she doesn't remember when    Hypertension     Resolved after bariatric surgery    Mild heartburn     resolved    Mitral regurgitation     Murmur, cardiac     "slight" since childhood    Ovarian cyst     Pneumonia 2003    x1- double pneumonia and was hospitalized    Transaminitis     liver enzymes elevated prior to gastric bypass    Tricuspid regurgitation        Past Surgical History:   Procedure Laterality Date     SECTION      x1    ENDOMETRIAL ABLATION      ESOPHAGOGASTRODUODENOSCOPY N/A 2017    Procedure: ESOPHAGOGASTRODUODENOSCOPY (EGD); Surgeon: Beni Cedillo MD;  Location[facility];  Service:    [address] Right 3/4/2019    Procedure: CYSTECTOMY OVARIAN, LAPAROSCOPIC;  Surgeon: Barno Camarena MD;  [facility];  Service: Gynecology    MN EGD TRANSORAL BIOPSY SINGLE/MULTIPLE N/A 3/1/2017    Procedure: ESOPHAGOGASTRODUODENOSCOPY (EGD) with gastric bx;  Surgeon: Beni Cedillo MD;  Location: [facility];   Service: Bariatrics    MN LAP GASTRIC BYPASS/EMILIA-EN-Y N/A 2017    Procedure: BYPASS GASTRIC  EMILIA-EN-Y LAPAROSCOPIC;  Surgeon: Beni Cedillo MD;  [facility];  Service: Bariatrics    TUBAL LIGATION         Family History   Problem Relation Age of Onset    Anxiety disorder Mother     Depression Mother     Leukemia Mother     Hypertension Father     Coronary artery disease Maternal Grandmother     Stroke Maternal Grandfather          Medications have been verified  Objective   /90   Pulse 67   Temp 98 3 °F (36 8 °C) (Tympanic)   Resp 16   Ht 5' (1 524 m)   Wt 59 1 kg (130 lb 4 7 oz)   LMP 02/23/2020   SpO2 100%   BMI 25 45 kg/m²        Physical Exam     Physical Exam   HENT:   Right Ear: External ear normal    Left Ear: External ear normal    Posterior pharynx is not erythematous there is no exudate  Cardiovascular: Normal heart sounds  Pulmonary/Chest: Breath sounds normal    Lymphadenopathy:     She has no cervical adenopathy       Rapid strep screen negative at 5 minutes

## 2020-03-07 LAB — BACTERIA THROAT CULT: NORMAL

## 2020-03-10 DIAGNOSIS — B00.9 HERPES SIMPLEX INFECTION: ICD-10-CM

## 2020-03-10 RX ORDER — VALACYCLOVIR HYDROCHLORIDE 500 MG/1
500 TABLET, FILM COATED ORAL DAILY
Qty: 90 TABLET | Refills: 3 | Status: SHIPPED | OUTPATIENT
Start: 2020-03-10 | End: 2021-03-19

## 2020-04-07 DIAGNOSIS — F32.A DEPRESSION, UNSPECIFIED DEPRESSION TYPE: ICD-10-CM

## 2020-04-07 RX ORDER — FLUOXETINE HYDROCHLORIDE 20 MG/1
CAPSULE ORAL
Qty: 90 CAPSULE | Refills: 1 | OUTPATIENT
Start: 2020-04-07

## 2020-10-26 DIAGNOSIS — F32.A DEPRESSION, UNSPECIFIED DEPRESSION TYPE: ICD-10-CM

## 2020-10-27 RX ORDER — FLUOXETINE HYDROCHLORIDE 40 MG/1
CAPSULE ORAL
Qty: 90 CAPSULE | Refills: 3 | OUTPATIENT
Start: 2020-10-27

## 2020-11-03 ENCOUNTER — TELEMEDICINE (OUTPATIENT)
Dept: FAMILY MEDICINE CLINIC | Facility: CLINIC | Age: 40
End: 2020-11-03
Payer: COMMERCIAL

## 2020-11-03 DIAGNOSIS — F33.42 RECURRENT MAJOR DEPRESSIVE DISORDER, IN FULL REMISSION (HCC): ICD-10-CM

## 2020-11-03 DIAGNOSIS — E50.9 VITAMIN A DEFICIENCY: ICD-10-CM

## 2020-11-03 DIAGNOSIS — J30.9 ALLERGIC RHINITIS, UNSPECIFIED SEASONALITY, UNSPECIFIED TRIGGER: ICD-10-CM

## 2020-11-03 DIAGNOSIS — K91.2 POSTSURGICAL MALABSORPTION: ICD-10-CM

## 2020-11-03 DIAGNOSIS — R51.9 ACUTE NONINTRACTABLE HEADACHE, UNSPECIFIED HEADACHE TYPE: ICD-10-CM

## 2020-11-03 DIAGNOSIS — D72.819 LEUKOPENIA, UNSPECIFIED TYPE: ICD-10-CM

## 2020-11-03 DIAGNOSIS — R05.9 COUGH: ICD-10-CM

## 2020-11-03 DIAGNOSIS — R51.9 ACUTE NONINTRACTABLE HEADACHE, UNSPECIFIED HEADACHE TYPE: Primary | ICD-10-CM

## 2020-11-03 DIAGNOSIS — F32.A DEPRESSION, UNSPECIFIED DEPRESSION TYPE: ICD-10-CM

## 2020-11-03 DIAGNOSIS — N25.81 SECONDARY HYPERPARATHYROIDISM (HCC): ICD-10-CM

## 2020-11-03 DIAGNOSIS — E55.9 VITAMIN D DEFICIENCY: ICD-10-CM

## 2020-11-03 PROCEDURE — 99214 OFFICE O/P EST MOD 30 MIN: CPT | Performed by: FAMILY MEDICINE

## 2020-11-03 PROCEDURE — U0003 INFECTIOUS AGENT DETECTION BY NUCLEIC ACID (DNA OR RNA); SEVERE ACUTE RESPIRATORY SYNDROME CORONAVIRUS 2 (SARS-COV-2) (CORONAVIRUS DISEASE [COVID-19]), AMPLIFIED PROBE TECHNIQUE, MAKING USE OF HIGH THROUGHPUT TECHNOLOGIES AS DESCRIBED BY CMS-2020-01-R: HCPCS | Performed by: FAMILY MEDICINE

## 2020-11-03 PROCEDURE — 1036F TOBACCO NON-USER: CPT | Performed by: FAMILY MEDICINE

## 2020-11-03 RX ORDER — FLUOXETINE HYDROCHLORIDE 40 MG/1
40 CAPSULE ORAL DAILY
Qty: 90 CAPSULE | Refills: 3 | Status: SHIPPED | OUTPATIENT
Start: 2020-11-03 | End: 2021-10-28

## 2020-11-05 LAB — SARS-COV-2 RNA SPEC QL NAA+PROBE: NOT DETECTED

## 2020-11-30 ENCOUNTER — LAB (OUTPATIENT)
Dept: LAB | Facility: CLINIC | Age: 40
End: 2020-11-30
Payer: COMMERCIAL

## 2020-11-30 DIAGNOSIS — R05.9 COUGH: ICD-10-CM

## 2020-11-30 DIAGNOSIS — N25.81 SECONDARY HYPERPARATHYROIDISM (HCC): ICD-10-CM

## 2020-11-30 DIAGNOSIS — K91.2 POSTSURGICAL MALABSORPTION: ICD-10-CM

## 2020-11-30 DIAGNOSIS — R51.9 ACUTE NONINTRACTABLE HEADACHE, UNSPECIFIED HEADACHE TYPE: ICD-10-CM

## 2020-11-30 DIAGNOSIS — E55.9 VITAMIN D DEFICIENCY: ICD-10-CM

## 2020-11-30 DIAGNOSIS — E50.9 VITAMIN A DEFICIENCY: ICD-10-CM

## 2020-11-30 DIAGNOSIS — D72.819 LEUKOPENIA, UNSPECIFIED TYPE: ICD-10-CM

## 2020-11-30 DIAGNOSIS — J30.9 ALLERGIC RHINITIS, UNSPECIFIED SEASONALITY, UNSPECIFIED TRIGGER: ICD-10-CM

## 2020-11-30 LAB
25(OH)D3 SERPL-MCNC: 32.2 NG/ML (ref 30–100)
ALBUMIN SERPL BCP-MCNC: 4.2 G/DL (ref 3.5–5)
ALP SERPL-CCNC: 92 U/L (ref 46–116)
ALT SERPL W P-5'-P-CCNC: 23 U/L (ref 12–78)
ANION GAP SERPL CALCULATED.3IONS-SCNC: 8 MMOL/L (ref 4–13)
AST SERPL W P-5'-P-CCNC: 17 U/L (ref 5–45)
BASOPHILS # BLD AUTO: 0.06 THOUSANDS/ΜL (ref 0–0.1)
BASOPHILS NFR BLD AUTO: 1 % (ref 0–1)
BILIRUB SERPL-MCNC: 0.46 MG/DL (ref 0.2–1)
BILIRUB UR QL STRIP: NEGATIVE
BUN SERPL-MCNC: 4 MG/DL (ref 5–25)
CALCIUM SERPL-MCNC: 9.4 MG/DL (ref 8.3–10.1)
CHLORIDE SERPL-SCNC: 101 MMOL/L (ref 100–108)
CLARITY UR: CLEAR
CO2 SERPL-SCNC: 27 MMOL/L (ref 21–32)
COLOR UR: YELLOW
CREAT SERPL-MCNC: 0.54 MG/DL (ref 0.6–1.3)
EOSINOPHIL # BLD AUTO: 0.07 THOUSAND/ΜL (ref 0–0.61)
EOSINOPHIL NFR BLD AUTO: 1 % (ref 0–6)
ERYTHROCYTE [DISTWIDTH] IN BLOOD BY AUTOMATED COUNT: 12.1 % (ref 11.6–15.1)
GFR SERPL CREATININE-BSD FRML MDRD: 118 ML/MIN/1.73SQ M
GLUCOSE P FAST SERPL-MCNC: 81 MG/DL (ref 65–99)
GLUCOSE UR STRIP-MCNC: NEGATIVE MG/DL
HCT VFR BLD AUTO: 38.8 % (ref 34.8–46.1)
HGB BLD-MCNC: 12.7 G/DL (ref 11.5–15.4)
HGB UR QL STRIP.AUTO: NEGATIVE
IMM GRANULOCYTES # BLD AUTO: 0.01 THOUSAND/UL (ref 0–0.2)
IMM GRANULOCYTES NFR BLD AUTO: 0 % (ref 0–2)
KETONES UR STRIP-MCNC: NEGATIVE MG/DL
LEUKOCYTE ESTERASE UR QL STRIP: NEGATIVE
LYMPHOCYTES # BLD AUTO: 2.2 THOUSANDS/ΜL (ref 0.6–4.47)
LYMPHOCYTES NFR BLD AUTO: 46 % (ref 14–44)
MCH RBC QN AUTO: 30.1 PG (ref 26.8–34.3)
MCHC RBC AUTO-ENTMCNC: 32.7 G/DL (ref 31.4–37.4)
MCV RBC AUTO: 92 FL (ref 82–98)
MONOCYTES # BLD AUTO: 0.5 THOUSAND/ΜL (ref 0.17–1.22)
MONOCYTES NFR BLD AUTO: 10 % (ref 4–12)
NEUTROPHILS # BLD AUTO: 2.02 THOUSANDS/ΜL (ref 1.85–7.62)
NEUTS SEG NFR BLD AUTO: 42 % (ref 43–75)
NITRITE UR QL STRIP: NEGATIVE
NRBC BLD AUTO-RTO: 0 /100 WBCS
PH UR STRIP.AUTO: 7 [PH]
PLATELET # BLD AUTO: 347 THOUSANDS/UL (ref 149–390)
PMV BLD AUTO: 10.5 FL (ref 8.9–12.7)
POTASSIUM SERPL-SCNC: 3.5 MMOL/L (ref 3.5–5.3)
PROT SERPL-MCNC: 7.1 G/DL (ref 6.4–8.2)
PROT UR STRIP-MCNC: NEGATIVE MG/DL
PTH-INTACT SERPL-MCNC: 61.9 PG/ML (ref 18.4–80.1)
RBC # BLD AUTO: 4.22 MILLION/UL (ref 3.81–5.12)
SODIUM SERPL-SCNC: 136 MMOL/L (ref 136–145)
SP GR UR STRIP.AUTO: 1.01 (ref 1–1.03)
UROBILINOGEN UR QL STRIP.AUTO: 0.2 E.U./DL
WBC # BLD AUTO: 4.86 THOUSAND/UL (ref 4.31–10.16)

## 2020-11-30 PROCEDURE — 84590 ASSAY OF VITAMIN A: CPT

## 2020-11-30 PROCEDURE — 83970 ASSAY OF PARATHORMONE: CPT

## 2020-11-30 PROCEDURE — 85025 COMPLETE CBC W/AUTO DIFF WBC: CPT

## 2020-11-30 PROCEDURE — 36415 COLL VENOUS BLD VENIPUNCTURE: CPT

## 2020-11-30 PROCEDURE — 82306 VITAMIN D 25 HYDROXY: CPT

## 2020-11-30 PROCEDURE — 81003 URINALYSIS AUTO W/O SCOPE: CPT

## 2020-11-30 PROCEDURE — 80053 COMPREHEN METABOLIC PANEL: CPT

## 2020-12-03 LAB — VIT A SERPL-MCNC: 37.5 UG/DL (ref 20.1–62)

## 2021-03-19 DIAGNOSIS — B00.9 HERPES SIMPLEX INFECTION: ICD-10-CM

## 2021-03-19 RX ORDER — VALACYCLOVIR HYDROCHLORIDE 500 MG/1
500 TABLET, FILM COATED ORAL DAILY
Qty: 90 TABLET | Refills: 0 | Status: SHIPPED | OUTPATIENT
Start: 2021-03-19 | End: 2021-06-21

## 2021-03-26 DIAGNOSIS — Z23 ENCOUNTER FOR IMMUNIZATION: ICD-10-CM

## 2021-06-20 DIAGNOSIS — B00.9 HERPES SIMPLEX INFECTION: ICD-10-CM

## 2021-06-21 RX ORDER — VALACYCLOVIR HYDROCHLORIDE 500 MG/1
TABLET, FILM COATED ORAL
Qty: 90 TABLET | Refills: 0 | Status: SHIPPED | OUTPATIENT
Start: 2021-06-21 | End: 2022-06-21

## 2021-09-27 ENCOUNTER — TELEPHONE (OUTPATIENT)
Dept: FAMILY MEDICINE CLINIC | Facility: CLINIC | Age: 41
End: 2021-09-27

## 2021-09-27 DIAGNOSIS — Z11.52 ENCOUNTER FOR SCREENING FOR COVID-19: Primary | ICD-10-CM

## 2021-09-27 NOTE — TELEPHONE ENCOUNTER
TOMORROW WILL BE DAY 5 OF EXPOSURE FOR PT - NO SYMPTOMS  SHE IS A  AND SCHOOL ASKED HER TO PLEASE CALL US AND SHE WOULD LIKE TO BE TESTED  PLEASE LET HER KNOW WHEN COVID TEST IS PUT IN AND SHE WILL GO TOMORROW TO [place] BETWEEN 5P AND 9 P  BUT PLEASE CALL HER WHEN TEST IS IN  [facility]  [phone]

## 2021-09-28 PROCEDURE — 0241U HB NFCT DS VIR RESP RNA 4 TRGT: CPT | Performed by: FAMILY MEDICINE

## 2021-10-18 ENCOUNTER — HOSPITAL ENCOUNTER (OUTPATIENT)
Dept: CT IMAGING | Facility: HOSPITAL | Age: 41
Discharge: HOME | End: 2021-10-18
Payer: COMMERCIAL

## 2021-10-18 ENCOUNTER — APPOINTMENT (OUTPATIENT)
Dept: LAB | Facility: HOSPITAL | Age: 41
End: 2021-10-18
Payer: COMMERCIAL

## 2021-10-18 ENCOUNTER — OFFICE VISIT (OUTPATIENT)
Dept: FAMILY MEDICINE CLINIC | Facility: CLINIC | Age: 41
End: 2021-10-18
Payer: COMMERCIAL

## 2021-10-18 VITALS
SYSTOLIC BLOOD PRESSURE: 138 MMHG | RESPIRATION RATE: 16 BRPM | HEART RATE: 72 BPM | HEIGHT: 60 IN | DIASTOLIC BLOOD PRESSURE: 80 MMHG | BODY MASS INDEX: 24.74 KG/M2 | WEIGHT: 126 LBS

## 2021-10-18 DIAGNOSIS — M62.830 MUSCLE SPASM OF BACK: ICD-10-CM

## 2021-10-18 DIAGNOSIS — Z12.31 VISIT FOR SCREENING MAMMOGRAM: ICD-10-CM

## 2021-10-18 DIAGNOSIS — R10.9 BILATERAL FLANK PAIN: Primary | ICD-10-CM

## 2021-10-18 DIAGNOSIS — M54.50 ACUTE BILATERAL LOW BACK PAIN WITHOUT SCIATICA: ICD-10-CM

## 2021-10-18 DIAGNOSIS — R10.32 LEFT LOWER QUADRANT ABDOMINAL PAIN: ICD-10-CM

## 2021-10-18 DIAGNOSIS — R10.9 BILATERAL FLANK PAIN: ICD-10-CM

## 2021-10-18 DIAGNOSIS — Z00.00 HEALTH CARE MAINTENANCE: ICD-10-CM

## 2021-10-18 DIAGNOSIS — Z23 ENCOUNTER FOR IMMUNIZATION: ICD-10-CM

## 2021-10-18 LAB
ALBUMIN SERPL BCP-MCNC: 4.4 G/DL (ref 3.5–5)
ALP SERPL-CCNC: 110 U/L (ref 46–116)
ALT SERPL W P-5'-P-CCNC: 37 U/L (ref 12–78)
AMYLASE SERPL-CCNC: 53 IU/L (ref 25–115)
ANION GAP SERPL CALCULATED.3IONS-SCNC: 8 MMOL/L (ref 4–13)
AST SERPL W P-5'-P-CCNC: 26 U/L (ref 5–45)
BILIRUB SERPL-MCNC: 0.45 MG/DL (ref 0.2–1)
BUN SERPL-MCNC: 8 MG/DL (ref 5–25)
CALCIUM SERPL-MCNC: 9 MG/DL (ref 8.3–10.1)
CHLORIDE SERPL-SCNC: 104 MMOL/L (ref 100–108)
CO2 SERPL-SCNC: 28 MMOL/L (ref 21–32)
CREAT SERPL-MCNC: 0.59 MG/DL (ref 0.6–1.3)
ERYTHROCYTE [DISTWIDTH] IN BLOOD BY AUTOMATED COUNT: 11.8 % (ref 11.6–15.1)
GFR SERPL CREATININE-BSD FRML MDRD: 113 ML/MIN/1.73SQ M
GLUCOSE SERPL-MCNC: 94 MG/DL (ref 65–140)
HCG SERPL QL: NEGATIVE
HCT VFR BLD AUTO: 41.5 % (ref 34.8–46.1)
HGB BLD-MCNC: 13.8 G/DL (ref 11.5–15.4)
LIPASE SERPL-CCNC: 120 U/L (ref 73–393)
MCH RBC QN AUTO: 30.2 PG (ref 26.8–34.3)
MCHC RBC AUTO-ENTMCNC: 33.3 G/DL (ref 31.4–37.4)
MCV RBC AUTO: 91 FL (ref 82–98)
PLATELET # BLD AUTO: 320 THOUSANDS/UL (ref 149–390)
PMV BLD AUTO: 10.6 FL (ref 8.9–12.7)
POTASSIUM SERPL-SCNC: 4 MMOL/L (ref 3.5–5.3)
PROT SERPL-MCNC: 7.7 G/DL (ref 6.4–8.2)
RBC # BLD AUTO: 4.57 MILLION/UL (ref 3.81–5.12)
SODIUM SERPL-SCNC: 140 MMOL/L (ref 136–145)
WBC # BLD AUTO: 6.57 THOUSAND/UL (ref 4.31–10.16)

## 2021-10-18 PROCEDURE — 87086 URINE CULTURE/COLONY COUNT: CPT | Performed by: FAMILY MEDICINE

## 2021-10-18 PROCEDURE — 80053 COMPREHEN METABOLIC PANEL: CPT | Performed by: FAMILY MEDICINE

## 2021-10-18 PROCEDURE — 83690 ASSAY OF LIPASE: CPT

## 2021-10-18 PROCEDURE — 3725F SCREEN DEPRESSION PERFORMED: CPT | Performed by: FAMILY MEDICINE

## 2021-10-18 PROCEDURE — 74177 CT ABD & PELVIS W/CONTRAST: CPT

## 2021-10-18 PROCEDURE — 85027 COMPLETE CBC AUTOMATED: CPT | Performed by: FAMILY MEDICINE

## 2021-10-18 PROCEDURE — 84703 CHORIONIC GONADOTROPIN ASSAY: CPT

## 2021-10-18 PROCEDURE — 36415 COLL VENOUS BLD VENIPUNCTURE: CPT | Performed by: FAMILY MEDICINE

## 2021-10-18 PROCEDURE — 82150 ASSAY OF AMYLASE: CPT

## 2021-10-18 PROCEDURE — 90471 IMMUNIZATION ADMIN: CPT | Performed by: FAMILY MEDICINE

## 2021-10-18 PROCEDURE — 90686 IIV4 VACC NO PRSV 0.5 ML IM: CPT | Performed by: FAMILY MEDICINE

## 2021-10-18 PROCEDURE — G1004 CDSM NDSC: HCPCS

## 2021-10-18 PROCEDURE — 99214 OFFICE O/P EST MOD 30 MIN: CPT | Performed by: FAMILY MEDICINE

## 2021-10-18 RX ORDER — TIZANIDINE 4 MG/1
4 TABLET ORAL EVERY 8 HOURS PRN
Qty: 30 TABLET | Refills: 0 | Status: SHIPPED | OUTPATIENT
Start: 2021-10-18 | End: 2021-11-02

## 2021-10-18 RX ADMIN — IOHEXOL 50 ML: 240 INJECTION, SOLUTION INTRATHECAL; INTRAVASCULAR; INTRAVENOUS; ORAL at 12:33

## 2021-10-18 RX ADMIN — IOHEXOL 100 ML: 350 INJECTION, SOLUTION INTRAVENOUS at 12:34

## 2021-10-19 ENCOUNTER — TELEPHONE (OUTPATIENT)
Dept: FAMILY MEDICINE CLINIC | Facility: CLINIC | Age: 41
End: 2021-10-19

## 2021-10-20 ENCOUNTER — OFFICE VISIT (OUTPATIENT)
Dept: FAMILY MEDICINE CLINIC | Facility: CLINIC | Age: 41
End: 2021-10-20
Payer: COMMERCIAL

## 2021-10-20 VITALS
SYSTOLIC BLOOD PRESSURE: 138 MMHG | WEIGHT: 125 LBS | DIASTOLIC BLOOD PRESSURE: 76 MMHG | BODY MASS INDEX: 24.54 KG/M2 | HEIGHT: 60 IN | HEART RATE: 76 BPM

## 2021-10-20 DIAGNOSIS — M62.830 MUSCLE SPASM OF BACK: ICD-10-CM

## 2021-10-20 DIAGNOSIS — N83.00 FOLLICLE CYST: ICD-10-CM

## 2021-10-20 DIAGNOSIS — Z98.84 BARIATRIC SURGERY STATUS: ICD-10-CM

## 2021-10-20 DIAGNOSIS — N83.202 CYST OF LEFT OVARY: ICD-10-CM

## 2021-10-20 DIAGNOSIS — R10.9 BILATERAL FLANK PAIN: Primary | ICD-10-CM

## 2021-10-20 DIAGNOSIS — N83.201 RIGHT OVARIAN CYST: ICD-10-CM

## 2021-10-20 DIAGNOSIS — R10.32 LEFT LOWER QUADRANT ABDOMINAL PAIN: ICD-10-CM

## 2021-10-20 DIAGNOSIS — M54.50 ACUTE BILATERAL LOW BACK PAIN WITHOUT SCIATICA: ICD-10-CM

## 2021-10-20 LAB — BACTERIA UR CULT: NORMAL

## 2021-10-20 PROCEDURE — 3008F BODY MASS INDEX DOCD: CPT | Performed by: FAMILY MEDICINE

## 2021-10-20 PROCEDURE — 99214 OFFICE O/P EST MOD 30 MIN: CPT | Performed by: FAMILY MEDICINE

## 2021-10-20 PROCEDURE — 1036F TOBACCO NON-USER: CPT | Performed by: FAMILY MEDICINE

## 2021-10-20 RX ORDER — CELECOXIB 200 MG/1
CAPSULE ORAL
Qty: 30 CAPSULE | Refills: 1 | Status: SHIPPED | OUTPATIENT
Start: 2021-10-20 | End: 2021-11-02

## 2021-10-27 DIAGNOSIS — F32.A DEPRESSION, UNSPECIFIED DEPRESSION TYPE: ICD-10-CM

## 2021-10-28 RX ORDER — FLUOXETINE HYDROCHLORIDE 40 MG/1
CAPSULE ORAL
Qty: 90 CAPSULE | Refills: 3 | Status: SHIPPED | OUTPATIENT
Start: 2021-10-28

## 2021-11-02 ENCOUNTER — OFFICE VISIT (OUTPATIENT)
Dept: OBGYN CLINIC | Facility: MEDICAL CENTER | Age: 41
End: 2021-11-02
Payer: COMMERCIAL

## 2021-11-02 VITALS
BODY MASS INDEX: 24.94 KG/M2 | WEIGHT: 127 LBS | HEIGHT: 60 IN | DIASTOLIC BLOOD PRESSURE: 78 MMHG | SYSTOLIC BLOOD PRESSURE: 124 MMHG

## 2021-11-02 DIAGNOSIS — N83.202 CYST OF LEFT OVARY: Primary | ICD-10-CM

## 2021-11-02 PROCEDURE — 99213 OFFICE O/P EST LOW 20 MIN: CPT | Performed by: NURSE PRACTITIONER

## 2021-11-02 PROCEDURE — 1036F TOBACCO NON-USER: CPT | Performed by: NURSE PRACTITIONER

## 2021-11-29 ENCOUNTER — OFFICE VISIT (OUTPATIENT)
Dept: URGENT CARE | Facility: CLINIC | Age: 41
End: 2021-11-29
Payer: COMMERCIAL

## 2021-11-29 ENCOUNTER — ANNUAL EXAM (OUTPATIENT)
Dept: OBGYN CLINIC | Facility: MEDICAL CENTER | Age: 41
End: 2021-11-29
Payer: COMMERCIAL

## 2021-11-29 VITALS — TEMPERATURE: 97.4 F | RESPIRATION RATE: 18 BRPM | OXYGEN SATURATION: 99 % | HEART RATE: 68 BPM

## 2021-11-29 VITALS
WEIGHT: 128 LBS | SYSTOLIC BLOOD PRESSURE: 134 MMHG | DIASTOLIC BLOOD PRESSURE: 72 MMHG | BODY MASS INDEX: 25.13 KG/M2 | HEIGHT: 60 IN

## 2021-11-29 DIAGNOSIS — J06.9 ACUTE URI: Primary | ICD-10-CM

## 2021-11-29 DIAGNOSIS — Z01.419 WELL WOMAN EXAM WITH ROUTINE GYNECOLOGICAL EXAM: Primary | ICD-10-CM

## 2021-11-29 PROCEDURE — 3008F BODY MASS INDEX DOCD: CPT | Performed by: NURSE PRACTITIONER

## 2021-11-29 PROCEDURE — U0005 INFEC AGEN DETEC AMPLI PROBE: HCPCS | Performed by: NURSE PRACTITIONER

## 2021-11-29 PROCEDURE — S0612 ANNUAL GYNECOLOGICAL EXAMINA: HCPCS | Performed by: NURSE PRACTITIONER

## 2021-11-29 PROCEDURE — 99213 OFFICE O/P EST LOW 20 MIN: CPT | Performed by: NURSE PRACTITIONER

## 2021-11-29 PROCEDURE — U0003 INFECTIOUS AGENT DETECTION BY NUCLEIC ACID (DNA OR RNA); SEVERE ACUTE RESPIRATORY SYNDROME CORONAVIRUS 2 (SARS-COV-2) (CORONAVIRUS DISEASE [COVID-19]), AMPLIFIED PROBE TECHNIQUE, MAKING USE OF HIGH THROUGHPUT TECHNOLOGIES AS DESCRIBED BY CMS-2020-01-R: HCPCS | Performed by: NURSE PRACTITIONER

## 2021-11-29 RX ORDER — FEXOFENADINE HCL 180 MG/1
180 TABLET ORAL DAILY
COMMUNITY

## 2021-12-01 ENCOUNTER — TELEMEDICINE (OUTPATIENT)
Dept: FAMILY MEDICINE CLINIC | Facility: CLINIC | Age: 41
End: 2021-12-01
Payer: COMMERCIAL

## 2021-12-01 DIAGNOSIS — U07.1 COVID-19: Primary | ICD-10-CM

## 2021-12-01 LAB — SARS-COV-2 RNA RESP QL NAA+PROBE: POSITIVE

## 2021-12-01 PROCEDURE — 99213 OFFICE O/P EST LOW 20 MIN: CPT | Performed by: FAMILY MEDICINE

## 2021-12-06 ENCOUNTER — TELEMEDICINE (OUTPATIENT)
Dept: FAMILY MEDICINE CLINIC | Facility: CLINIC | Age: 41
End: 2021-12-06
Payer: COMMERCIAL

## 2021-12-06 VITALS — TEMPERATURE: 100 F

## 2021-12-06 DIAGNOSIS — U07.1 COVID-19: Primary | ICD-10-CM

## 2021-12-06 PROCEDURE — 99213 OFFICE O/P EST LOW 20 MIN: CPT | Performed by: FAMILY MEDICINE

## 2021-12-08 ENCOUNTER — TELEMEDICINE (OUTPATIENT)
Dept: FAMILY MEDICINE CLINIC | Facility: CLINIC | Age: 41
End: 2021-12-08
Payer: COMMERCIAL

## 2021-12-08 DIAGNOSIS — U07.1 COVID-19: Primary | ICD-10-CM

## 2021-12-08 PROCEDURE — 1036F TOBACCO NON-USER: CPT | Performed by: FAMILY MEDICINE

## 2021-12-08 PROCEDURE — 99213 OFFICE O/P EST LOW 20 MIN: CPT | Performed by: FAMILY MEDICINE

## 2022-09-12 NOTE — PROGRESS NOTES
Active Problems    1  Cardiac murmur (785 2) (R01 1)   2  Depression (311) (F32 9)   3  Encounter for gynecological examination (V72 31) (Z01 419)   4  Fatty infiltration of liver (571 8) (K76 0)   5  Herpes simplex infection (054 9) (B00 9)   6  Hypertension (401 9) (I10)   7  Morbid obesity with BMI of 40 0-44 9, adult (278 01,V85 41) (E66 01,Z68 41)   8  Need for prophylactic vaccination and inoculation against influenza (V04 81) (Z23)   9  Routine Gynecological Exam With Cervical Pap Smear   10  Status post gastric bypass for obesity (V45 86) (Z98 84)   11  History of Transaminitis (790 4) (R74 0)    Current Meds   1  Bariatric Fusion Oral Tablet Chewable; Therapy: (Recorded:09Jun2017) to Recorded   2  FLUoxetine HCl - 20 MG Oral Capsule (PROzac); take one capsule by mouth daily; Therapy: 76CQL7587 to (05 12 73 93 30)  Requested for: 24Apr2017; Last   Rx:[id number] Ordered   3  Iron TABS; Therapy: (Recorded:09Jun2017) to Recorded   4  Omeprazole 20 MG Oral Capsule Delayed Release; TAKE ONE CAPSULE BY MOUTH   EVERY DAY; Therapy: 41GUE5941 to (Nyoka Hinckley)  Requested for: 99LTF8455; Last   Rx:[id number] Ordered   5  Oxycodone-Acetaminophen 5-325 MG Oral Tablet (Percocet); TAKE 1 TABLET EVERY 4   TO 6 HOURS AS NEEDED FOR PAIN;   Therapy: 82LAD0385 to (Evaluate:99Rzg9184); Last Rx:[id number] Ordered   6  UPCal D 500-250 POWD;   Therapy: (KKGJQLQD:18PGP1323) to Recorded   7  ValACYclovir HCl - 500 MG Oral Tablet; TAKE 1 TABLET EVERY DAY; Therapy: 02JJE9548 to (Evaluate:28Qjb8004)  Requested for: 32ZHJ0428; Last   Rx:[id number] Ordered    Allergies    1  Wellbutrin TABS    Discussion/Summary    : Patient attended 5 week post op class   Reviewed diet progression, vitamin and mineral recommendations, 30/60 rules, volume of foods, protein supplements, hydration needs, antacid guidelines, recommendation to f/u with pcp concerning med adjustments, exercise guidelines, hair shedding, contraception guidelines, constipation prevention and treatment, alcohol avoidance and recommendations of when to call the office  Patient was also weighed and filled out form for program   Time was left for discussion and to answer questions        Signatures   Electronically signed by : KONG Lloyd; Jul 11 2017 10:25AM EST                       (Author) yes

## 2022-10-25 ENCOUNTER — CLINICAL SUPPORT (OUTPATIENT)
Dept: FAMILY MEDICINE CLINIC | Facility: CLINIC | Age: 42
End: 2022-10-25
Payer: COMMERCIAL

## 2022-10-25 ENCOUNTER — TELEPHONE (OUTPATIENT)
Dept: FAMILY MEDICINE CLINIC | Facility: CLINIC | Age: 42
End: 2022-10-25

## 2022-10-25 DIAGNOSIS — E50.9 VITAMIN A DEFICIENCY: ICD-10-CM

## 2022-10-25 DIAGNOSIS — E53.8 LOW VITAMIN B12 LEVEL: ICD-10-CM

## 2022-10-25 DIAGNOSIS — K91.2 POSTSURGICAL MALABSORPTION: Primary | ICD-10-CM

## 2022-10-25 DIAGNOSIS — Z23 ENCOUNTER FOR IMMUNIZATION: Primary | ICD-10-CM

## 2022-10-25 DIAGNOSIS — E55.9 VITAMIN D DEFICIENCY: ICD-10-CM

## 2022-10-25 DIAGNOSIS — N25.81 SECONDARY HYPERPARATHYROIDISM (HCC): ICD-10-CM

## 2022-10-25 DIAGNOSIS — F33.42 RECURRENT MAJOR DEPRESSIVE DISORDER, IN FULL REMISSION (HCC): ICD-10-CM

## 2022-10-25 PROCEDURE — 90686 IIV4 VACC NO PRSV 0.5 ML IM: CPT

## 2022-10-25 PROCEDURE — 90471 IMMUNIZATION ADMIN: CPT

## 2022-10-25 NOTE — TELEPHONE ENCOUNTER
Labs ordered based on prior testing from [address] [place] [address], and her active problem list     1  Postsurgical malabsorption  -     TSH, 3rd generation; Future; Expected date: 10/25/2022  -     CBC and differential; Future  -     Comprehensive metabolic panel; Future; Expected date: 10/25/2022  -     Lipid Panel with Direct LDL reflex; Future; Expected date: 10/25/2022  -     Vitamin B12; Future  -     Folate; Future  -     Vitamin D 25 hydroxy; Future  -     Vitamin B1, whole blood; Future  -     Vitamin A; Future  -     PTH, intact; Future    2  Vitamin A deficiency  -     Vitamin A; Future    3  Vitamin D deficiency  -     Vitamin D 25 hydroxy; Future    4  Secondary hyperparathyroidism (HCC)  -     PTH, intact; Future    5  Low vitamin B12 level  -     CBC and differential; Future  -     Vitamin B12; Future    6  Rec[facility] major [facility], [facility]  -     TSH, 3rd generation; Future; Expected date: 10/25/2022  -     CBC and differential; Future  -     Comprehensive metabolic panel;  Future; Expected date: 10/25/2022

## 2022-10-25 NOTE — TELEPHONE ENCOUNTER
Tay Mercado called in and scheduled her yearly physical for 11/29/22 but will like labs put in before 11/8/22 so she can get blood work done on 11/8/22 so she can have results before her appt

## 2022-11-08 ENCOUNTER — APPOINTMENT (OUTPATIENT)
Dept: LAB | Facility: CLINIC | Age: 42
End: 2022-11-08

## 2022-11-08 DIAGNOSIS — E55.9 VITAMIN D DEFICIENCY: ICD-10-CM

## 2022-11-08 DIAGNOSIS — E53.8 LOW VITAMIN B12 LEVEL: ICD-10-CM

## 2022-11-08 DIAGNOSIS — N25.81 SECONDARY HYPERPARATHYROIDISM (HCC): ICD-10-CM

## 2022-11-08 DIAGNOSIS — F33.42 RECURRENT MAJOR DEPRESSIVE DISORDER, IN FULL REMISSION (HCC): ICD-10-CM

## 2022-11-08 DIAGNOSIS — E50.9 VITAMIN A DEFICIENCY: ICD-10-CM

## 2022-11-08 DIAGNOSIS — K91.2 POSTSURGICAL MALABSORPTION: ICD-10-CM

## 2022-11-08 LAB
25(OH)D3 SERPL-MCNC: 36.1 NG/ML (ref 30–100)
ALBUMIN SERPL BCP-MCNC: 3.8 G/DL (ref 3.5–5)
ALP SERPL-CCNC: 84 U/L (ref 46–116)
ALT SERPL W P-5'-P-CCNC: 20 U/L (ref 12–78)
ANION GAP SERPL CALCULATED.3IONS-SCNC: 5 MMOL/L (ref 4–13)
AST SERPL W P-5'-P-CCNC: 19 U/L (ref 5–45)
BASOPHILS # BLD AUTO: 0.04 THOUSANDS/ÂΜL (ref 0–0.1)
BASOPHILS NFR BLD AUTO: 1 % (ref 0–1)
BILIRUB SERPL-MCNC: 0.46 MG/DL (ref 0.2–1)
BUN SERPL-MCNC: 7 MG/DL (ref 5–25)
CALCIUM SERPL-MCNC: 8.8 MG/DL (ref 8.3–10.1)
CHLORIDE SERPL-SCNC: 106 MMOL/L (ref 96–108)
CHOLEST SERPL-MCNC: 124 MG/DL
CO2 SERPL-SCNC: 25 MMOL/L (ref 21–32)
CREAT SERPL-MCNC: 0.59 MG/DL (ref 0.6–1.3)
EOSINOPHIL # BLD AUTO: 0.04 THOUSAND/ÂΜL (ref 0–0.61)
EOSINOPHIL NFR BLD AUTO: 1 % (ref 0–6)
ERYTHROCYTE [DISTWIDTH] IN BLOOD BY AUTOMATED COUNT: 11.5 % (ref 11.6–15.1)
FOLATE SERPL-MCNC: >20 NG/ML (ref 3.1–17.5)
GFR SERPL CREATININE-BSD FRML MDRD: 112 ML/MIN/1.73SQ M
GLUCOSE P FAST SERPL-MCNC: 89 MG/DL (ref 65–99)
HCT VFR BLD AUTO: 37.1 % (ref 34.8–46.1)
HDLC SERPL-MCNC: 66 MG/DL
HGB BLD-MCNC: 12.6 G/DL (ref 11.5–15.4)
IMM GRANULOCYTES # BLD AUTO: 0.01 THOUSAND/UL (ref 0–0.2)
IMM GRANULOCYTES NFR BLD AUTO: 0 % (ref 0–2)
LDLC SERPL CALC-MCNC: 49 MG/DL (ref 0–100)
LYMPHOCYTES # BLD AUTO: 1.33 THOUSANDS/ÂΜL (ref 0.6–4.47)
LYMPHOCYTES NFR BLD AUTO: 38 % (ref 14–44)
MCH RBC QN AUTO: 31 PG (ref 26.8–34.3)
MCHC RBC AUTO-ENTMCNC: 34 G/DL (ref 31.4–37.4)
MCV RBC AUTO: 91 FL (ref 82–98)
MONOCYTES # BLD AUTO: 0.33 THOUSAND/ÂΜL (ref 0.17–1.22)
MONOCYTES NFR BLD AUTO: 9 % (ref 4–12)
NEUTROPHILS # BLD AUTO: 1.8 THOUSANDS/ÂΜL (ref 1.85–7.62)
NEUTS SEG NFR BLD AUTO: 51 % (ref 43–75)
NRBC BLD AUTO-RTO: 0 /100 WBCS
PLATELET # BLD AUTO: 313 THOUSANDS/UL (ref 149–390)
PMV BLD AUTO: 10.8 FL (ref 8.9–12.7)
POTASSIUM SERPL-SCNC: 3.7 MMOL/L (ref 3.5–5.3)
PROT SERPL-MCNC: 6.8 G/DL (ref 6.4–8.4)
PTH-INTACT SERPL-MCNC: 125.9 PG/ML (ref 18.4–80.1)
RBC # BLD AUTO: 4.07 MILLION/UL (ref 3.81–5.12)
SODIUM SERPL-SCNC: 136 MMOL/L (ref 135–147)
TRIGL SERPL-MCNC: 47 MG/DL
TSH SERPL DL<=0.05 MIU/L-ACNC: 1.22 UIU/ML (ref 0.45–4.5)
VIT B12 SERPL-MCNC: 286 PG/ML (ref 100–900)
WBC # BLD AUTO: 3.55 THOUSAND/UL (ref 4.31–10.16)

## 2022-11-10 LAB — VIT B1 BLD-SCNC: 101.8 NMOL/L (ref 66.5–200)

## 2022-11-15 LAB — VIT A SERPL-MCNC: 35.2 UG/DL (ref 20.1–62)

## 2022-11-22 DIAGNOSIS — F32.A DEPRESSION, UNSPECIFIED DEPRESSION TYPE: ICD-10-CM

## 2022-11-22 RX ORDER — FLUOXETINE HYDROCHLORIDE 40 MG/1
CAPSULE ORAL
Qty: 90 CAPSULE | Refills: 3 | Status: SHIPPED | OUTPATIENT
Start: 2022-11-22

## 2022-11-29 ENCOUNTER — OFFICE VISIT (OUTPATIENT)
Dept: FAMILY MEDICINE CLINIC | Facility: CLINIC | Age: 42
End: 2022-11-29

## 2022-11-29 VITALS
BODY MASS INDEX: 26.7 KG/M2 | HEART RATE: 84 BPM | HEIGHT: 60 IN | WEIGHT: 136 LBS | DIASTOLIC BLOOD PRESSURE: 76 MMHG | RESPIRATION RATE: 16 BRPM | SYSTOLIC BLOOD PRESSURE: 124 MMHG

## 2022-11-29 DIAGNOSIS — Z12.31 ENCOUNTER FOR SCREENING MAMMOGRAM FOR BREAST CANCER: ICD-10-CM

## 2022-11-29 DIAGNOSIS — J30.9 ALLERGIC RHINITIS, UNSPECIFIED SEASONALITY, UNSPECIFIED TRIGGER: ICD-10-CM

## 2022-11-29 DIAGNOSIS — N25.81 SECONDARY HYPERPARATHYROIDISM (HCC): ICD-10-CM

## 2022-11-29 DIAGNOSIS — E53.8 LOW VITAMIN B12 LEVEL: ICD-10-CM

## 2022-11-29 DIAGNOSIS — E78.5 DYSLIPIDEMIA: ICD-10-CM

## 2022-11-29 DIAGNOSIS — F33.42 RECURRENT MAJOR DEPRESSIVE DISORDER, IN FULL REMISSION (HCC): ICD-10-CM

## 2022-11-29 DIAGNOSIS — D72.819 LEUKOPENIA, UNSPECIFIED TYPE: ICD-10-CM

## 2022-11-29 DIAGNOSIS — Z12.4 SCREENING FOR CERVICAL CANCER: ICD-10-CM

## 2022-11-29 DIAGNOSIS — E50.9 VITAMIN A DEFICIENCY: ICD-10-CM

## 2022-11-29 DIAGNOSIS — Z00.00 HEALTH CARE MAINTENANCE: Primary | ICD-10-CM

## 2022-11-29 DIAGNOSIS — E55.9 VITAMIN D DEFICIENCY: ICD-10-CM

## 2022-11-29 PROBLEM — U07.1 COVID-19: Status: RESOLVED | Noted: 2021-12-01 | Resolved: 2022-11-29

## 2022-11-29 NOTE — PROGRESS NOTES
Name: Janeen Page      : 1980      MRN: [id number]  Encounter Provider: Reinaldo Clayton DO  Encounter Date: 2022   Encounter department: [facility] PRIMARY CARE    Assessment & Plan     1  Healthcare maintenance   Exam completed  Blood work reviewed  Mammography ordered   Gyn exam ordered  COVID vaccine refused  2  Hyperparathyroidism this has increased from 61 up-to-date 125  Will repeat with calcitonin in 4 weeks, refer to endocrinology  3  Allergic rhinitis, stable continue present therapy  4  Dyslipidemia history of mildly low HDL in the past normal the present time continue to monitor  5  Vitamin deficiencies 8, B12, and D, all stable continue to monitor  6  Leukopenia, seen by Hematology in the past was benign repeat CBC with differential   7  Return in 1 year sooner if need      1  Health care maintenance  Assessment & Plan:  Exam completed   Mammography and gyn exams ordered   Blood work reviewed  COVID booster declined    Orders:  -     CBC; Future; Expected date: 2023  -     Comprehensive metabolic panel; Future; Expected date: 2023  -     Lipid Panel with Direct LDL reflex; Future; Expected date: 2023  -     TSH, 3rd generation with Free T4 reflex; Future; Expected date: 2023  -     UA (URINE) with reflex to Scope; Future; Expected date: 2023  -     Vitamin D 25 hydroxy; Future; Expected date: 2023  -     Vitamin B12; Future; Expected date: 2023  -     Folate; Future; Expected date: 2023  -     Vitamin A; Future; Expected date: 2023  -     PTH, intact; Future; Expected date: 2023    2  Encounter for screening mammogram for breast cancer  -     Mammo screening bilateral w 3d & cad; Future; Expected date: 2022  -     CBC; Future; Expected date: 2023  -     Comprehensive metabolic panel; Future; Expected date: 2023  -     Lipid Panel with Direct LDL reflex;  Future; Expected date: 2023  - TSH, 3rd generation with Free T4 reflex; Future; Expected date: 11/01/2023  -     UA (URINE) with reflex to Scope; Future; Expected date: 11/01/2023  -     Vitamin D 25 hydroxy; Future; Expected date: 11/01/2023  -     Vitamin B12; Future; Expected date: 11/01/2023  -     Folate; Future; Expected date: 11/01/2023  -     Vitamin A; Future; Expected date: 11/01/2023  -     PTH, intact; Future; Expected date: 11/01/2023    3  Screening for cervical cancer  -     Ambulatory referral to Obstetrics / Gynecology; Future  -     CBC; Future; Expected date: 11/01/2023  -     Comprehensive metabolic panel; Future; Expected date: 11/01/2023  -     Lipid Panel with Direct LDL reflex; Future; Expected date: 11/01/2023  -     TSH, 3rd generation with Free T4 reflex; Future; Expected date: 11/01/2023  -     UA (URINE) with reflex to Scope; Future; Expected date: 11/01/2023  -     Vitamin D 25 hydroxy; Future; Expected date: 11/01/2023  -     Vitamin B12; Future; Expected date: 11/01/2023  -     Folate; Future; Expected date: 11/01/2023  -     Vitamin A; Future; Expected date: 11/01/2023  -     PTH, intact; Future; Expected date: 11/01/2023    4  Secondary hyperparathyroidism ([facility] [address] )  Assessment & Plan:  Has increased from 01/25/2061, will repeat in 4 weeks refer to endocrinology    Orders:  -     CBC and differential; Future; Expected date: 12/23/2022  -     PTH, intact; Future; Expected date: 12/23/2022  -     Calcitonin; Future; Expected date: 12/23/2022  -     Calcium; Future; Expected date: 12/23/2022  -     Ambulatory Referral to Endocrinology; Future  -     CBC; Future; Expected date: 11/01/2023  -     Comprehensive metabolic panel; Future; Expected date: 11/01/2023  -     Lipid Panel with Direct LDL reflex; Future; Expected date: 11/01/2023  -     TSH, 3rd generation with Free T4 reflex; Future; Expected date: 11/01/2023  -     UA (URINE) with reflex to Scope;  Future; Expected date: 11/01/2023  -     Vitamin D 25 hydroxy; Future; Expected date: 11/01/2023  -     Vitamin B12; Future; Expected date: 11/01/2023  -     Folate; Future; Expected date: 11/01/2023  -     Vitamin A; Future; Expected date: 11/01/2023  -     PTH, intact; Future; Expected date: 11/01/2023    5  Leukopenia, unspecified type  Assessment & Plan:  Evaluated a few years ago by Hematology found to be benign will repeat with differential in 4 weeks    Orders:  -     CBC and differential; Future; Expected date: 12/23/2022  -     PTH, intact; Future; Expected date: 12/23/2022  -     Calcitonin; Future; Expected date: 12/23/2022  -     Calcium; Future; Expected date: 12/23/2022  -     CBC; Future; Expected date: 11/01/2023  -     Comprehensive metabolic panel; Future; Expected date: 11/01/2023  -     Lipid Panel with Direct LDL reflex; Future; Expected date: 11/01/2023  -     TSH, 3rd generation with Free T4 reflex; Future; Expected date: 11/01/2023  -     UA (URINE) with reflex to Scope; Future; Expected date: 11/01/2023  -     Vitamin D 25 hydroxy; Future; Expected date: 11/01/2023  -     Vitamin B12; Future; Expected date: 11/01/2023  -     Folate; Future; Expected date: 11/01/2023  -     Vitamin A; Future; Expected date: 11/01/2023  -     PTH, intact; Future; Expected date: 11/01/2023    6  Allergic rhinitis, unspecified seasonality, unspecified trigger  Assessment & Plan:  Stable continue Allegra Flonase    Orders:  -     CBC; Future; Expected date: 11/01/2023  -     Comprehensive metabolic panel; Future; Expected date: 11/01/2023  -     Lipid Panel with Direct LDL reflex; Future; Expected date: 11/01/2023  -     TSH, 3rd generation with Free T4 reflex; Future; Expected date: 11/01/2023  -     UA (URINE) with reflex to Scope; Future; Expected date: 11/01/2023  -     Vitamin D 25 hydroxy; Future; Expected date: 11/01/2023  -     Vitamin B12; Future; Expected date: 11/01/2023  -     Folate;  Future; Expected date: 11/01/2023  -     Vitamin A; Future; Expected date: 11/01/2023  -     PTH, intact; Future; Expected date: 11/01/2023    7  Low vitamin B12 level  Assessment & Plan:  Stable continue to monitor    Orders:  -     CBC; Future; Expected date: 11/01/2023  -     Comprehensive metabolic panel; Future; Expected date: 11/01/2023  -     Lipid Panel with Direct LDL reflex; Future; Expected date: 11/01/2023  -     TSH, 3rd generation with Free T4 reflex; Future; Expected date: 11/01/2023  -     UA (URINE) with reflex to Scope; Future; Expected date: 11/01/2023  -     Vitamin D 25 hydroxy; Future; Expected date: 11/01/2023  -     Vitamin B12; Future; Expected date: 11/01/2023  -     Folate; Future; Expected date: 11/01/2023  -     Vitamin A; Future; Expected date: 11/01/2023  -     PTH, intact; Future; Expected date: 11/01/2023    8  Recurrent major depressive disorder, in full remission [facility])  Assessment & Plan:  Resolved    Orders:  -     CBC; Future; Expected date: 11/01/2023  -     Comprehensive metabolic panel; Future; Expected date: 11/01/2023  -     Lipid Panel with Direct LDL reflex; Future; Expected date: 11/01/2023  -     TSH, 3rd generation with Free T4 reflex; Future; Expected date: 11/01/2023  -     UA (URINE) with reflex to Scope; Future; Expected date: 11/01/2023  -     Vitamin D 25 hydroxy; Future; Expected date: 11/01/2023  -     Vitamin B12; Future; Expected date: 11/01/2023  -     Folate; Future; Expected date: 11/01/2023  -     Vitamin A; Future; Expected date: 11/01/2023  -     PTH, intact; Future; Expected date: 11/01/2023    9  Vitamin A deficiency  Assessment & Plan:  Stable continue to monitor    Orders:  -     CBC; Future; Expected date: 11/01/2023  -     Comprehensive metabolic panel; Future; Expected date: 11/01/2023  -     Lipid Panel with Direct LDL reflex; Future; Expected date: 11/01/2023  -     TSH, 3rd generation with Free T4 reflex; Future; Expected date: 11/01/2023  -     UA (URINE) with reflex to Scope;  Future; Expected date: 11/01/2023  - Vitamin D 25 hydroxy; Future; Expected date: 11/01/2023  -     Vitamin B12; Future; Expected date: 11/01/2023  -     Folate; Future; Expected date: 11/01/2023  -     Vitamin A; Future; Expected date: 11/01/2023  -     PTH, intact; Future; Expected date: 11/01/2023    10  Vitamin D deficiency  Assessment & Plan:  Stable continue to monitor    Orders:  -     CBC; Future; Expected date: 11/01/2023  -     Comprehensive metabolic panel; Future; Expected date: 11/01/2023  -     Lipid Panel with Direct LDL reflex; Future; Expected date: 11/01/2023  -     TSH, 3rd generation with Free T4 reflex; Future; Expected date: 11/01/2023  -     UA (URINE) with reflex to Scope; Future; Expected date: 11/01/2023  -     Vitamin D 25 hydroxy; Future; Expected date: 11/01/2023  -     Vitamin B12; Future; Expected date: 11/01/2023  -     Folate; Future; Expected date: 11/01/2023  -     Vitamin A; Future; Expected date: 11/01/2023  -     PTH, intact; Future; Expected date: 11/01/2023    11  Dyslipidemia  Assessment & Plan:  had low HDL, normal the present time continue to monitor    Orders:  -     CBC; Future; Expected date: 11/01/2023  -     Comprehensive metabolic panel; Future; Expected date: 11/01/2023  -     Lipid Panel with Direct LDL reflex; Future; Expected date: 11/01/2023  -     TSH, 3rd generation with Free T4 reflex; Future; Expected date: 11/01/2023  -     UA (URINE) with reflex to Scope; Future; Expected date: 11/01/2023  -     Vitamin D 25 hydroxy; Future; Expected date: 11/01/2023  -     Vitamin B12; Future; Expected date: 11/01/2023  -     Folate; Future; Expected date: 11/01/2023  -     Vitamin A; Future; Expected date: 11/01/2023  -     PTH, intact; Future; Expected date: 11/01/2023      BMI Counseling: Body mass index is 26 56 kg/m²  The BMI is above normal  Nutrition recommendations include moderation in carbohydrate intake  Exercise recommendations include exercising 3-5 times per week   Rationale for BMI follow-up plan is due to patient being overweight or obese  Subjective      Patient doing well without any medical complaints concerns at the present time  Review of Systems   Constitutional: Negative  HENT: Negative  Eyes: Negative  Respiratory: Negative  Cardiovascular: Negative  Gastrointestinal: Negative  Endocrine: Negative  Genitourinary: Negative  Musculoskeletal: Negative  Skin: Negative  Allergic/Immunologic: Negative  Neurological: Negative  Hematological: Negative  Psychiatric/Behavioral: Negative  Current Outpatient Medications on File Prior to Visit   Medication Sig   • acetaminophen (TYLENOL) 500 mg tablet Take 500 mg by mouth every 6 (six) hours as needed for mild pain   • Calcium Citrate-Vitamin D 500-500 MG-UNT/5GM POWD Take by mouth   • cholecalciferol (VITAMIN D3) 1,000 units tablet Take by mouth daily   • fexofenadine (ALLEGRA) 180 MG tablet Take 180 mg by mouth daily   • FLUoxetine (PROzac) 40 MG capsule TAKE 1 CAPSULE BY MOUTH EVERY DAY   • fluticasone (FLONASE) 50 mcg/act nasal spray 2 sprays into each nostril as needed    • IRON PO Take by mouth daily    • Multiple Vitamins-Minerals (BARIATRIC FUSION) CHEW Chew   • valACYclovir (VALTREX) 500 mg tablet TAKE 1 TABLET BY MOUTH EVERY DAY   • vitamin E, tocopherol, 400 units capsule Take 400 Units by mouth daily       Objective     /76 (BP Location: Right arm, Patient Position: Sitting, Cuff Size: Standard)   Pulse 84   Resp 16   Ht 5' (1 524 m)   Wt 61 7 kg (136 lb)   BMI 26 56 kg/m²       Vision Screening    Right eye Left eye Both eyes   Without correction      With correction 20/20 20/20 20/20     Physical Exam  Vitals and nursing note reviewed  Constitutional:       Appearance: Normal appearance  HENT:      Head: Normocephalic and atraumatic        Right Ear: Tympanic membrane normal       Nose:      Comments: Mildly allergic turbinates     Mouth/Throat:      Mouth: Mucous membranes are moist       Pharynx: Oropharynx is clear  No oropharyngeal exudate or posterior oropharyngeal erythema  Comments: Scant clear postnasal drip  Eyes:      General: No scleral icterus  Neck:      Vascular: No carotid bruit  Cardiovascular:      Rate and Rhythm: Normal rate and regular rhythm  Heart sounds: Normal heart sounds  Pulmonary:      Effort: Pulmonary effort is normal       Breath sounds: Normal breath sounds  Abdominal:      General: Bowel sounds are normal       Palpations: Abdomen is soft  Tenderness: There is no abdominal tenderness  Musculoskeletal:      Cervical back: Neck supple  Right lower leg: No edema  Left lower leg: No edema  Lymphadenopathy:      Cervical: No cervical adenopathy  Skin:     General: Skin is warm and dry  Neurological:      General: No focal deficit present  Mental Status: She is alert     Psychiatric:         Mood and Affect: Mood normal        Eloy Alonzo DO

## 2022-11-29 NOTE — PATIENT INSTRUCTIONS
Repeat CBC and parathyroid hormone in 1 month, nonfasting  Follow with Endocrinology for evaluation of elevated parathyroid hormone   Return in 1 year sooner if needed  Complete mammography  Complete gyn exam

## 2022-12-27 ENCOUNTER — APPOINTMENT (OUTPATIENT)
Dept: LAB | Facility: CLINIC | Age: 42
End: 2022-12-27

## 2022-12-27 DIAGNOSIS — D72.819 LEUKOPENIA, UNSPECIFIED TYPE: ICD-10-CM

## 2022-12-27 DIAGNOSIS — N25.81 SECONDARY HYPERPARATHYROIDISM (HCC): ICD-10-CM

## 2022-12-27 LAB
BASOPHILS # BLD AUTO: 0.04 THOUSANDS/ÂΜL (ref 0–0.1)
BASOPHILS NFR BLD AUTO: 1 % (ref 0–1)
CALCIUM SERPL-MCNC: 8.4 MG/DL (ref 8.3–10.1)
EOSINOPHIL # BLD AUTO: 0.02 THOUSAND/ÂΜL (ref 0–0.61)
EOSINOPHIL NFR BLD AUTO: 0 % (ref 0–6)
ERYTHROCYTE [DISTWIDTH] IN BLOOD BY AUTOMATED COUNT: 11.9 % (ref 11.6–15.1)
HCT VFR BLD AUTO: 38.8 % (ref 34.8–46.1)
HGB BLD-MCNC: 12.8 G/DL (ref 11.5–15.4)
IMM GRANULOCYTES # BLD AUTO: 0.02 THOUSAND/UL (ref 0–0.2)
IMM GRANULOCYTES NFR BLD AUTO: 0 % (ref 0–2)
LYMPHOCYTES # BLD AUTO: 1.26 THOUSANDS/ÂΜL (ref 0.6–4.47)
LYMPHOCYTES NFR BLD AUTO: 21 % (ref 14–44)
MCH RBC QN AUTO: 30.7 PG (ref 26.8–34.3)
MCHC RBC AUTO-ENTMCNC: 33 G/DL (ref 31.4–37.4)
MCV RBC AUTO: 93 FL (ref 82–98)
MONOCYTES # BLD AUTO: 0.42 THOUSAND/ÂΜL (ref 0.17–1.22)
MONOCYTES NFR BLD AUTO: 7 % (ref 4–12)
NEUTROPHILS # BLD AUTO: 4.17 THOUSANDS/ÂΜL (ref 1.85–7.62)
NEUTS SEG NFR BLD AUTO: 71 % (ref 43–75)
NRBC BLD AUTO-RTO: 0 /100 WBCS
PLATELET # BLD AUTO: 292 THOUSANDS/UL (ref 149–390)
PMV BLD AUTO: 10.7 FL (ref 8.9–12.7)
PTH-INTACT SERPL-MCNC: 135.5 PG/ML (ref 18.4–80.1)
RBC # BLD AUTO: 4.17 MILLION/UL (ref 3.81–5.12)
WBC # BLD AUTO: 5.93 THOUSAND/UL (ref 4.31–10.16)

## 2022-12-29 ENCOUNTER — CONSULT (OUTPATIENT)
Dept: ENDOCRINOLOGY | Facility: CLINIC | Age: 42
End: 2022-12-29

## 2022-12-29 VITALS
DIASTOLIC BLOOD PRESSURE: 78 MMHG | BODY MASS INDEX: 26.5 KG/M2 | HEIGHT: 60 IN | SYSTOLIC BLOOD PRESSURE: 110 MMHG | HEART RATE: 73 BPM | WEIGHT: 135 LBS

## 2022-12-29 DIAGNOSIS — N25.81 SECONDARY HYPERPARATHYROIDISM (HCC): Primary | ICD-10-CM

## 2022-12-29 DIAGNOSIS — K91.2 POSTSURGICAL MALABSORPTION: ICD-10-CM

## 2022-12-29 DIAGNOSIS — E55.9 VITAMIN D DEFICIENCY: ICD-10-CM

## 2022-12-29 LAB — CALCIT SERPL-MCNC: 6.3 PG/ML (ref 0–5)

## 2022-12-29 NOTE — PROGRESS NOTES
Don Ormond 36 y o  female MRN: [id number]    Encounter: [id number]      Assessment/Plan     Problem List Items Addressed This Visit        Digestive    Postsurgical malabsorption       Endocrine    Secondary hyperparathyroidism ([address] ) - Primary     Could be due to low calcium intake-for now advised to increase vitamin D to 2000 iu daily   Calcium supplements -  3 scopes a day   (total 1500 mg )   Urine test and labs in 3 months          Relevant Orders    PTH, intact Lab Collect Lab Collect    Phosphorus Lab Collect    Basic metabolic panel Lab Collect    Albumin Lab Collect    Calcium, urine, 24 hour Lab Collect    Creatinine, urine, 24 hour Lab Collect       Other    Vitamin D deficiency     Increase vitamin D3 to 2000 international units daily, repeat labs in the next 3 to 4 months         Relevant Orders    Vitamin D 25 hydroxy Lab Collect     CC:   hyperparathyroidism    History of Present Illness     HPI:  58-year-old female referred here for evaluation of hyperparathyroidism  She underwent  Gastric bypass 2017- lost 100 lbs and has gained some back      Currently taking-Calcium supplements -500 mg daily  + bariatric MVI    VITAMIN D3 1000 iu daily     1-2 servings of dairy a day     No low trauma fractures, no history of hypercalcemia  No history of kidney stones     Menstrual cycles are regular           Review of Systems    Historical Information   Past Medical History:   Diagnosis Date   • Allergic rhinitis 08/09/2013   • Anemia    • Anxiety    • Depression    • Fatty liver     resolved with weight loss    • Herpes     Last outbreak was within the past year but she doesn't remember when   • Hypertension     Resolved after bariatric surgery   • Mild heartburn     resolved   • Mitral regurgitation    • Murmur, cardiac     "slight" since childhood   • Ovarian cyst    • Pneumonia 2003    x1- double pneumonia and was hospitalized   • Transaminitis     liver enzymes elevated prior to gastric bypass   • Tricuspid regurgitation      Past Surgical History:   Procedure Laterality Date   •  SECTION      x1   • ENDOMETRIAL ABLATION     • ESOPHAGOGASTRODUODENOSCOPY N/A 2017    Procedure: ESOPHAGOGASTRODUODENOSCOPY (EGD); Surgeon: Deny Love MD;  Location: [facility];  Service:    • PELVIC LAPAROSCOPY Right 3/4/2019    Procedure: CYSTECTOMY OVARIAN, LAPAROSCOPIC;  Surgeon: Naresh Fisher MD;  Location: [facility];  Service: Gynecology   • SD EGD TRANSORAL BIOPSY SINGLE/MULTIPLE N/A 3/1/2017    Procedure: ESOPHAGOGASTRODUODENOSCOPY (EGD) with gastric bx;  Surgeon: Deny Love MD;  Location: [facility];   Service: Bariatrics   • SD LAPS GSTR RSTCV PX W/BYP EMILIA-EN-Y LIMB <150 CM N/A 2017    Procedure: BYPASS GASTRIC  EMILIA-EN-Y LAPAROSCOPIC;  Surgeon: Deny Love MD;  Location: [facility];  Service: Bariatrics   • TUBAL LIGATION       Social History   Social History     Substance and Sexual Activity   Alcohol Use Yes    Comment: social     Social History     Substance and Sexual Activity   Drug Use No     Social History     Tobacco Use   Smoking Status Never   Smokeless Tobacco Never     Family History:   Family History   Problem Relation Age of Onset   • Anxiety disorder Mother    • Depression Mother    • Leukemia Mother    • Other Mother         hysterectomy    • Hypertension Father    • Coronary artery disease Maternal Grandmother    • Heart failure Maternal Grandmother    • Diabetes type II Maternal Grandfather    • Stroke Maternal Grandfather    • Diabetes Paternal Grandmother    • Kidney failure Paternal Grandmother         on dialysis    • Diabetes type II Paternal Grandfather    • Diabetes Paternal Grandfather    • Allergies Son        Meds/Allergies   Current Outpatient Medications   Medication Sig Dispense Refill   • acetaminophen (TYLENOL) 500 mg tablet Take 500 mg by mouth every 6 (six) hours as needed for mild pain     • Calcium Citrate-Vitamin D 500-500 MG-UNT/5GM POWD Take by mouth • cholecalciferol (VITAMIN D3) 1,000 units tablet Take by mouth daily     • fexofenadine (ALLEGRA) 180 MG tablet Take 180 mg by mouth daily     • FLUoxetine (PROzac) 40 MG capsule TAKE 1 CAPSULE BY MOUTH EVERY DAY 90 capsule 3   • fluticasone (FLONASE) 50 mcg/act nasal spray 2 sprays into each nostril as needed      • IRON PO Take by mouth daily      • Multiple Vitamins-Minerals (BARIATRIC FUSION) CHEW Chew     • valACYclovir (VALTREX) 500 mg tablet TAKE 1 TABLET BY MOUTH EVERY DAY 90 tablet 0   • vitamin E, tocopherol, 400 units capsule Take 400 Units by mouth daily       No current facility-administered medications for this visit  Allergies   Allergen Reactions   • Nsaids Other (See Comments)     Has had bariatric surgery and can't have  • Wellbutrin [Bupropion] Other (See Comments) and Hyperactivity     "felt like she could climb the wall"       Objective   Vitals: Blood pressure 110/78, pulse 73, height 5' (1 524 m), weight 61 2 kg (135 lb), not currently breastfeeding  Physical Exam  Vitals reviewed  Constitutional:       General: She is not in acute distress  Appearance: Normal appearance  She is not ill-appearing, toxic-appearing or diaphoretic  HENT:      Head: Normocephalic and atraumatic  Eyes:      General: No scleral icterus  Extraocular Movements: Extraocular movements intact  Cardiovascular:      Rate and Rhythm: Normal rate and regular rhythm  Heart sounds: Normal heart sounds  No murmur heard  Pulmonary:      Effort: Pulmonary effort is normal  No respiratory distress  Breath sounds: Normal breath sounds  No wheezing  Abdominal:      General: There is no distension  Palpations: Abdomen is soft  Tenderness: There is no abdominal tenderness  There is no guarding  Musculoskeletal:      Cervical back: Neck supple  Right lower leg: No edema  Left lower leg: No edema  Lymphadenopathy:      Cervical: No cervical adenopathy     Skin:     General: Skin is warm and dry  Neurological:      General: No focal deficit present  Mental Status: She is alert and oriented to person, place, and time  Psychiatric:         Mood and Affect: Mood normal          Behavior: Behavior normal          Thought Content: Thought content normal          Judgment: Judgment normal          The history was obtained from the review of the chart, patient  Lab Results:   Lab Results   Component Value Date/Time    Potassium 3 7 11/08/2022 07:17 AM    Chloride 106 11/08/2022 07:17 AM    CO2 25 11/08/2022 07:17 AM    BUN 7 11/08/2022 07:17 AM    Creatinine 0 59 (L) 11/08/2022 07:17 AM    Glucose, Fasting 89 11/08/2022 07:17 AM    Calcium 8 4 12/27/2022 07:53 AM    Calcium 8 8 11/08/2022 07:17 AM    eGFR 112 11/08/2022 07:17 AM    TSH 3RD GENERATON 1 220 11/08/2022 07:17 AM     5 (H) 12/27/2022 07:53 AM     9 (H) 11/08/2022 07:17 AM    Vit D, 25-Hydroxy 36 1 11/08/2022 07:17 AM         Imaging Studies:             Portions of the record may have been created with voice recognition software  Occasional wrong word or "sound a like" substitutions may have occurred due to the inherent limitations of voice recognition software  Read the chart carefully and recognize, using context, where substitutions have occurred

## 2022-12-29 NOTE — ASSESSMENT & PLAN NOTE
Could be due to low calcium intake-for now advised to increase vitamin D to 2000 iu daily   Calcium supplements -  3 scopes a day   (total 1500 mg )   Urine test and labs in 3 months

## 2022-12-29 NOTE — PATIENT INSTRUCTIONS
Increase vitamin D to 2000 iu daily   Calcium supplements -  3 scopes a day   (total 1500 mg )   Urine test and labs in 3 months

## 2022-12-29 NOTE — PROGRESS NOTES
Philip Davido 36 y o  female MRN: [id number]    Encounter: [id number]      Assessment/Plan     Problem List Items Addressed This Visit        Endocrine    Secondary hyperparathyroidism [facility])     CC:   ***    History of Present Illness     HPI:  Gastric bypass 2017- lost 100 lbs and has gained some back  Calcium supplements   + bariatric MVI    VITAMIN D3 1000 iu daily     1-2 servings of dairy a day     No low trauma fractures   No history of kidney stones     Menstrual cycles are regular     Review of Systems    Historical Information   Past Medical History:   Diagnosis Date   • Allergic rhinitis 2013   • Anemia    • Anxiety    • Depression    • Fatty liver     resolved with weight loss    • Herpes     Last outbreak was within the past year but she doesn't remember when   • Hypertension     Resolved after bariatric surgery   • Mild heartburn     resolved   • Mitral regurgitation    • Murmur, cardiac     "slight" since childhood   • Ovarian cyst    • Pneumonia 2003    x1- double pneumonia and was hospitalized   • Transaminitis     liver enzymes elevated prior to gastric bypass   • Tricuspid regurgitation      Past Surgical History:   Procedure Laterality Date   •  SECTION      x1   • ENDOMETRIAL ABLATION     • ESOPHAGOGASTRODUODENOSCOPY N/A 2017    Procedure: ESOPHAGOGASTRODUODENOSCOPY (EGD); Surgeon: Darwin Izaguirre MD;  [facility];  Service:    • PELVIC LAPAROSCOPY Right 3/4/2019    Procedure: CYSTECTOMY OVARIAN, LAPAROSCOPIC;  Surgeon: Sergei Mercer MD;  [facility];  Service: Gynecology   • MD EGD TRANSORAL BIOPSY SINGLE/MULTIPLE N/A 3/1/2017    Procedure: ESOPHAGOGASTRODUODENOSCOPY (EGD) with gastric bx;  Surgeon: Darwin Izaguirre MD;  [facility];   Service: Bariatrics   • MD LAPS GSTR RSTCV PX W/BYP EMILIA-EN-Y LIMB <150 CM N/A 2017    Procedure: BYPASS GASTRIC  EMILIA-EN-Y LAPAROSCOPIC;  Surgeon: Darwin Izaguirre MD;  [facility];  Service: Kurt Elmore • TUBAL LIGATION       Social History   Social History     Substance and Sexual Activity   Alcohol Use Yes    Comment: social     Social History     Substance and Sexual Activity   Drug Use No     Social History     Tobacco Use   Smoking Status Never   Smokeless Tobacco Never     Family History:   Family History   Problem Relation Age of Onset   • Anxiety disorder Mother    • Depression Mother    • Leukemia Mother    • Other Mother         hysterectomy    • Hypertension Father    • Coronary artery disease Maternal Grandmother    • Heart failure Maternal Grandmother    • Stroke Maternal Grandfather    • Allergies Son    • Diabetes Paternal Grandmother    • Kidney failure Paternal Grandmother         on dialysis    • Diabetes Paternal Grandfather        Meds/Allergies   Current Outpatient Medications   Medication Sig Dispense Refill   • acetaminophen (TYLENOL) 500 mg tablet Take 500 mg by mouth every 6 (six) hours as needed for mild pain     • Calcium Citrate-Vitamin D 500-500 MG-UNT/5GM POWD Take by mouth     • cholecalciferol (VITAMIN D3) 1,000 units tablet Take by mouth daily     • fexofenadine (ALLEGRA) 180 MG tablet Take 180 mg by mouth daily     • FLUoxetine (PROzac) 40 MG capsule TAKE 1 CAPSULE BY MOUTH EVERY DAY 90 capsule 3   • fluticasone (FLONASE) 50 mcg/act nasal spray 2 sprays into each nostril as needed      • IRON PO Take by mouth daily      • Multiple Vitamins-Minerals (BARIATRIC FUSION) CHEW Chew     • valACYclovir (VALTREX) 500 mg tablet TAKE 1 TABLET BY MOUTH EVERY DAY 90 tablet 0   • vitamin E, tocopherol, 400 units capsule Take 400 Units by mouth daily       No current facility-administered medications for this visit  Allergies   Allergen Reactions   • Nsaids Other (See Comments)     Has had bariatric surgery and can't have     • Wellbutrin [Bupropion] Other (See Comments) and Hyperactivity     "felt like she could climb the wall"       Objective   Vitals: Blood pressure 110/78, pulse 73, height 5' (1 524 m), weight 61 2 kg (135 lb), not currently breastfeeding  Physical Exam    The history was obtained from the review of the chart, patient  Lab Results:   Lab Results   Component Value Date/Time    Potassium 3 7 11/08/2022 07:17 AM    Chloride 106 11/08/2022 07:17 AM    CO2 25 11/08/2022 07:17 AM    BUN 7 11/08/2022 07:17 AM    Creatinine 0 59 (L) 11/08/2022 07:17 AM    Glucose, Fasting 89 11/08/2022 07:17 AM    Calcium 8 4 12/27/2022 07:53 AM    Calcium 8 8 11/08/2022 07:17 AM    eGFR 112 11/08/2022 07:17 AM    TSH 3RD GENERATON 1 220 11/08/2022 07:17 AM     5 (H) 12/27/2022 07:53 AM     9 (H) 11/08/2022 07:17 AM    Vit D, 25-Hydroxy 36 1 11/08/2022 07:17 AM         Imaging Studies:                   {Results Review Statement:18809}    Portions of the record may have been created with voice recognition software  Occasional wrong word or "sound a like" substitutions may have occurred due to the inherent limitations of voice recognition software  Read the chart carefully and recognize, using context, where substitutions have occurred

## 2023-01-31 ENCOUNTER — HOSPITAL ENCOUNTER (OUTPATIENT)
Dept: MAMMOGRAPHY | Facility: MEDICAL CENTER | Age: 43
Discharge: HOME | End: 2023-01-31

## 2023-01-31 VITALS — BODY MASS INDEX: 26.5 KG/M2 | HEIGHT: 60 IN | WEIGHT: 135 LBS

## 2023-01-31 DIAGNOSIS — Z12.31 ENCOUNTER FOR SCREENING MAMMOGRAM FOR BREAST CANCER: ICD-10-CM

## 2023-04-07 ENCOUNTER — LAB (OUTPATIENT)
Dept: LAB | Facility: CLINIC | Age: 43
End: 2023-04-07

## 2023-04-07 DIAGNOSIS — N25.81 SECONDARY HYPERPARATHYROIDISM (HCC): ICD-10-CM

## 2023-04-07 DIAGNOSIS — E55.9 VITAMIN D DEFICIENCY: ICD-10-CM

## 2023-04-07 LAB
25(OH)D3 SERPL-MCNC: 31.2 NG/ML (ref 30–100)
ALBUMIN SERPL BCP-MCNC: 3.8 G/DL (ref 3.5–5)
ANION GAP SERPL CALCULATED.3IONS-SCNC: 3 MMOL/L (ref 4–13)
BUN SERPL-MCNC: 10 MG/DL (ref 5–25)
CALCIUM 24H UR-MCNC: 239.8 MG/24 HRS (ref 42–353)
CALCIUM SERPL-MCNC: 8.7 MG/DL (ref 8.3–10.1)
CHLORIDE SERPL-SCNC: 106 MMOL/L (ref 96–108)
CO2 SERPL-SCNC: 27 MMOL/L (ref 21–32)
CREAT 24H UR-MRATE: 1.2 G/24HR (ref 0.6–1.8)
CREAT SERPL-MCNC: 0.58 MG/DL (ref 0.6–1.3)
GFR SERPL CREATININE-BSD FRML MDRD: 113 ML/MIN/1.73SQ M
GLUCOSE P FAST SERPL-MCNC: 83 MG/DL (ref 65–99)
PHOSPHATE SERPL-MCNC: 3.1 MG/DL (ref 2.7–4.5)
POTASSIUM SERPL-SCNC: 3.8 MMOL/L (ref 3.5–5.3)
PTH-INTACT SERPL-MCNC: 127.8 PG/ML (ref 18.4–80.1)
SODIUM SERPL-SCNC: 136 MMOL/L (ref 135–147)
SPECIMEN VOL UR: 2200 ML
SPECIMEN VOL UR: 2200 ML

## 2023-04-09 NOTE — RESULT ENCOUNTER NOTE
Please call the patient regarding labs - PTH still high , vitamin D is on the lower end of normal, increase vitamin D3 by an  extra 1000 iu daily , discuss further on up cmoing visit

## 2023-05-11 ENCOUNTER — OFFICE VISIT (OUTPATIENT)
Dept: ENDOCRINOLOGY | Facility: CLINIC | Age: 43
End: 2023-05-11

## 2023-05-11 VITALS
DIASTOLIC BLOOD PRESSURE: 94 MMHG | HEART RATE: 72 BPM | SYSTOLIC BLOOD PRESSURE: 134 MMHG | BODY MASS INDEX: 27.05 KG/M2 | HEIGHT: 60 IN | WEIGHT: 137.8 LBS

## 2023-05-11 DIAGNOSIS — E55.9 VITAMIN D DEFICIENCY: ICD-10-CM

## 2023-05-11 DIAGNOSIS — N25.81 SECONDARY HYPERPARATHYROIDISM (HCC): Primary | ICD-10-CM

## 2023-05-11 RX ORDER — MULTIVIT WITH MINERALS/LUTEIN
1000 TABLET ORAL DAILY
COMMUNITY

## 2023-05-11 RX ORDER — ZINC GLUCONATE 50 MG
50 TABLET ORAL DAILY
COMMUNITY

## 2023-05-11 NOTE — ASSESSMENT & PLAN NOTE
Vitamin D: 31 2    Increase vitamin D supplement to 6000 IU/day and repeat vitamin D level in 3 months

## 2023-05-11 NOTE — PROGRESS NOTES
Established Patient Progress Note    CC: Follow-up for secondary hyperparathyroidism following gastric bypass surgery    Impression & Plan:    Problem List Items Addressed This Visit        Endocrine    Secondary hyperparathyroidism ([address] ) - Primary     PTH: 127 8  Corrected calcium: 8 9  Vitamin D: 31 2    Increase calcium intake to 1 additional scoop of calcium citrate powder that patient is currently taking which will equal an additional 500 mg of calcium per day for total of 1800 mg  Increase vitamin D to 6000 IU/day  Repeat lab work in 1 month including 24-hour urine calcium  Advised patient to stay hydrated to prevent kidney stones however she is on calcium citrate already which should help prevent this  Relevant Orders    PTH, intact- Lab Collect    Calcium, urine, 24 hour Lab Collect    Creatinine, urine, 24 hour Lab Collect    Calcium Lab Collect    Albumin Lab Collect    Vitamin D 25 hydroxy Lab Collect       Other    Vitamin D deficiency     Vitamin D: 31 2    Increase vitamin D supplement to 6000 IU/day and repeat vitamin D level in 3 months           Relevant Orders    Calcium Lab Collect    Albumin Lab Collect    Vitamin D 25 hydroxy Lab Collect       Orders Placed This Encounter   Procedures   • PTH, intact- Lab Collect     Standing Status:   Future     Standing Expiration Date:   5/11/2024   • Calcium, urine, 24 hour Lab Collect     Standing Status:   Future     Standing Expiration Date:   5/11/2024   • Creatinine, urine, 24 hour Lab Collect     Standing Status:   Future     Standing Expiration Date:   5/11/2024   • Calcium Lab Collect     Standing Status:   Future     Standing Expiration Date:   5/11/2024   • Albumin Lab Collect     Standing Status:   Future     Standing Expiration Date:   5/11/2024   • Vitamin D 25 hydroxy Lab Collect     Standing Status:   Future     Standing Expiration Date:   5/11/2024       History of Present Illness:   Marshal Valenzuela is a 36 y o  female with a "history of secondary hyperparathyroidism following gastric bypass surgery  Patient had gastric bypass surgery in 2017  She is currently taking 1300 mg of calcium between her supplement and multivitamin  Her most recent corrected calcium on lab work is 8 9  She is taking 4000 IU/day of vitamin D3  Her most recent vitamin D level was 31 2  Her urine calcium was normal   Her PTH remains elevated  Patient Active Problem List   Diagnosis   • Allergic rhinitis   • Herpes simplex infection   • Low vitamin B12 level   • Postsurgical malabsorption   • Secondary hyperparathyroidism ([address] )   • Bariatric surgery status   • Leukopenia   • Vitamin A deficiency   • Muscle spasm of back   • S/P right oophorectomy   • Health care maintenance   • Vitamin D deficiency   • Dyslipidemia      Past Medical History:   Diagnosis Date   • Allergic rhinitis 2013   • Anemia    • Anxiety    • Depression    • Fatty liver     resolved with weight loss    • Herpes     Last outbreak was within the past year but she doesn't remember when   • Hypertension     Resolved after bariatric surgery   • Mild heartburn     resolved   • Mitral regurgitation    • Murmur, cardiac     \"slight\" since childhood   • Ovarian cyst    • Pneumonia 2003    x1- double pneumonia and was hospitalized   • Transaminitis     liver enzymes elevated prior to gastric bypass   • Tricuspid regurgitation       Past Surgical History:   Procedure Laterality Date   •  SECTION      x1   • ENDOMETRIAL ABLATION     • ESOPHAGOGASTRODUODENOSCOPY N/A 2017    Procedure: ESOPHAGOGASTRODUODENOSCOPY (EGD); Surgeon: Gunjan Juarez MD;  [facility];  Service:    • PELVIC LAPAROSCOPY Right 3/4/2019    Procedure: CYSTECTOMY OVARIAN, LAPAROSCOPIC;  Surgeon:  Estefanía Boyce MD;  [facility];  Service: Gynecology   • IA EGD TRANSORAL BIOPSY SINGLE/MULTIPLE N/A 3/1/2017    Procedure: ESOPHAGOGASTRODUODENOSCOPY (EGD) with gastric bx;  Surgeon: Gunjan Juarez MD;  " "Location: [facility]; Service: Bariatrics   • SC LAPS GSTR RSTCV PX W/BYP EMILIA-EN-Y LIMB <150 CM N/A 5/30/2017    Procedure: BYPASS GASTRIC  EMILIA-EN-Y LAPAROSCOPIC;  Surgeon: Marcela Morales MD;  [facility];  Service: Bariatrics   • TUBAL LIGATION        Family History   Problem Relation Age of Onset   • Anxiety disorder Mother    • Depression Mother    • Leukemia Mother    • Other Mother         hysterectomy    • Hypertension Father    • Coronary artery disease Maternal Grandmother    • Heart failure Maternal Grandmother    • Diabetes type II Maternal Grandfather    • Stroke Maternal Grandfather    • Diabetes Paternal Grandmother    • Kidney failure Paternal Grandmother         on dialysis    • Diabetes type II Paternal Grandfather    • Diabetes Paternal Grandfather    • Allergies Son    • No Known Problems Maternal Aunt    • Breast cancer Neg Hx      Social History     Tobacco Use   • Smoking status: Never   • Smokeless tobacco: Never   Substance Use Topics   • Alcohol use: Not Currently     Comment: social     Allergies   Allergen Reactions   • Nsaids Other (See Comments)     Has had bariatric surgery and can't have     • Wellbutrin [Bupropion] Other (See Comments) and Hyperactivity     \"felt like she could climb the wall\"         Current Outpatient Medications:   •  acetaminophen (TYLENOL) 500 mg tablet, Take 500 mg by mouth every 6 (six) hours as needed for mild pain, Disp: , Rfl:   •  Ascorbic Acid (vitamin C) 1000 MG tablet, Take 1,000 mg by mouth daily, Disp: , Rfl:   •  Calcium Citrate-Vitamin D 500-500 MG-UNT/5GM POWD, Take by mouth, Disp: , Rfl:   •  cholecalciferol (VITAMIN D3) 1,000 units tablet, Take 3,000 Units by mouth daily, Disp: , Rfl:   •  fexofenadine (ALLEGRA) 180 MG tablet, Take 180 mg by mouth daily, Disp: , Rfl:   •  FLUoxetine (PROzac) 40 MG capsule, TAKE 1 CAPSULE BY MOUTH EVERY DAY, Disp: 90 capsule, Rfl: 3  •  fluticasone (FLONASE) 50 mcg/act nasal spray, 2 sprays into each " nostril as needed , Disp: , Rfl:   •  IRON PO, Take by mouth daily , Disp: , Rfl:   •  Multiple Vitamins-Minerals (BARIATRIC FUSION) CHEW, Chew, Disp: , Rfl:   •  valACYclovir (VALTREX) 500 mg tablet, TAKE 1 TABLET BY MOUTH EVERY DAY, Disp: 90 tablet, Rfl: 0  •  vitamin E, tocopherol, 400 units capsule, Take 400 Units by mouth daily, Disp: , Rfl:   •  zinc gluconate 50 mg tablet, Take 50 mg by mouth daily, Disp: , Rfl:     Review of Systems   Constitutional: Negative for chills and fever  HENT: Negative for ear pain and sore throat  Eyes: Negative for pain and visual disturbance  Respiratory: Negative for cough and shortness of breath  Cardiovascular: Negative for chest pain and palpitations  Gastrointestinal: Negative for abdominal pain and vomiting  Genitourinary: Negative for dysuria and hematuria  Musculoskeletal: Negative for arthralgias and back pain  Skin: Negative for color change and rash  Neurological: Negative for seizures and syncope  All other systems reviewed and are negative  Physical Exam:  Body mass index is 26 91 kg/m²  /94   Pulse 72   Ht 5' (1 524 m)   Wt 62 5 kg (137 lb 12 8 oz)   BMI 26 91 kg/m²    Wt Readings from Last 3 Encounters:   05/11/23 62 5 kg (137 lb 12 8 oz)   01/31/23 61 2 kg (135 lb)   12/29/22 61 2 kg (135 lb)       Physical Exam  Vitals reviewed  Constitutional:       Appearance: Normal appearance  HENT:      Head: Normocephalic and atraumatic  Cardiovascular:      Rate and Rhythm: Normal rate  Heart sounds: Normal heart sounds  Pulmonary:      Effort: Pulmonary effort is normal       Breath sounds: Normal breath sounds  Neurological:      Mental Status: She is alert and oriented to person, place, and time     Psychiatric:         Mood and Affect: Mood normal          Behavior: Behavior normal          Labs:   No results found for: HGBA1C  Lab Results   Component Value Date    CREATININE 0 58 (L) 04/07/2023    CREATININE 0 59 (L) 11/08/2022    CREATININE 0 59 (L) 10/18/2021    BUN 10 04/07/2023     08/11/2016    K 3 8 04/07/2023     04/07/2023    CO2 27 04/07/2023     eGFR   Date Value Ref Range Status   04/07/2023 113 ml/min/1 73sq m Final     Lab Results   Component Value Date    CHOL 163 08/11/2016    HDL 66 11/08/2022    TRIG 47 11/08/2022     Lab Results   Component Value Date    ALT 20 11/08/2022    AST 19 11/08/2022    ALKPHOS 84 11/08/2022    BILITOT 0 6 02/20/2017     Lab Results   Component Value Date    QXE5ICKPLAZY 1 220 11/08/2022    SDC7OKXQUDNM 1 580 12/30/2019    VGL7OTCCAOWY 2 870 03/22/2017     No results found for: FREET4, TSI      There are no Patient Instructions on file for this visit  Discussed with the patient and all questioned fully answered  She will call me if any problems arise

## 2023-05-11 NOTE — ASSESSMENT & PLAN NOTE
PTH: 127 8  Corrected calcium: 8 9  Vitamin D: 31 2    Increase calcium intake to 1 additional scoop of calcium citrate powder that patient is currently taking which will equal an additional 500 mg of calcium per day for total of 1800 mg  Increase vitamin D to 6000 IU/day  Repeat lab work in 1 month including 24-hour urine calcium  Advised patient to stay hydrated to prevent kidney stones however she is on calcium citrate already which should help prevent this

## 2023-07-20 ENCOUNTER — APPOINTMENT (OUTPATIENT)
Dept: LAB | Facility: CLINIC | Age: 43
End: 2023-07-20
Payer: COMMERCIAL

## 2023-07-20 DIAGNOSIS — N25.81 SECONDARY HYPERPARATHYROIDISM (HCC): ICD-10-CM

## 2023-07-20 DIAGNOSIS — E55.9 VITAMIN D DEFICIENCY: ICD-10-CM

## 2023-07-20 LAB
25(OH)D3 SERPL-MCNC: 49.7 NG/ML (ref 30–100)
ALBUMIN SERPL BCP-MCNC: 3.8 G/DL (ref 3.5–5)
CALCIUM 24H UR-MCNC: 483 MG/24 HRS (ref 42–353)
CALCIUM SERPL-MCNC: 9 MG/DL (ref 8.3–10.1)
CREAT 24H UR-MRATE: 1.5 G/24HR (ref 0.6–1.8)
PTH-INTACT SERPL-MCNC: 82 PG/ML (ref 12–88)
SPECIMEN VOL UR: 3500 ML
SPECIMEN VOL UR: 3500 ML

## 2023-07-20 PROCEDURE — 82306 VITAMIN D 25 HYDROXY: CPT

## 2023-07-20 PROCEDURE — 82040 ASSAY OF SERUM ALBUMIN: CPT

## 2023-07-20 PROCEDURE — 36415 COLL VENOUS BLD VENIPUNCTURE: CPT

## 2023-07-20 PROCEDURE — 82340 ASSAY OF CALCIUM IN URINE: CPT

## 2023-07-20 PROCEDURE — 83970 ASSAY OF PARATHORMONE: CPT

## 2023-07-20 PROCEDURE — 82310 ASSAY OF CALCIUM: CPT

## 2023-07-20 PROCEDURE — 82570 ASSAY OF URINE CREATININE: CPT

## 2023-07-25 ENCOUNTER — OFFICE VISIT (OUTPATIENT)
Dept: ENDOCRINOLOGY | Facility: CLINIC | Age: 43
End: 2023-07-25
Payer: COMMERCIAL

## 2023-07-25 VITALS
DIASTOLIC BLOOD PRESSURE: 94 MMHG | HEART RATE: 67 BPM | SYSTOLIC BLOOD PRESSURE: 142 MMHG | WEIGHT: 139.2 LBS | HEIGHT: 60 IN | BODY MASS INDEX: 27.33 KG/M2

## 2023-07-25 DIAGNOSIS — E55.9 VITAMIN D DEFICIENCY: ICD-10-CM

## 2023-07-25 DIAGNOSIS — N25.81 SECONDARY HYPERPARATHYROIDISM (HCC): Primary | ICD-10-CM

## 2023-07-25 PROCEDURE — 99214 OFFICE O/P EST MOD 30 MIN: CPT

## 2023-07-25 NOTE — PATIENT INSTRUCTIONS
A Guide to Calcium-Rich Foods  We all know that milk is a great source of calcium, but you may be surprised by all the different foods you can work into your diet to reach your daily recommended amount of calcium. Use the guide below to get ideas of additional calcium-rich foods to add to your weekly shopping list.  Produce Serving Size Estimated Calcium*   [place] greens, frozen 8 oz 360 mg   Broccoli sharan 8 oz 200 mg   Kale, frozen 8 oz 180 mg   Soy Beans, green, boiled 8 oz 175 mg   Bok [place], cooked, boiled 8 oz 160 mg   Figs, dried 2 figs 65 mg   Broccoli, fresh, cooked 8 oz 60 mg   Oranges 1 whole 55 mg   Seafood Serving Size Estimated Calcium*   Sardines, canned with bones 3 oz 325 mg   [place], canned with bones 3 oz 180 mg   Shrimp, canned 3 oz 125 mg   Dairy Serving Size Estimated Calcium*   Ricotta, part-skim 4 oz 335 mg   Yogurt, plain, low-fat 6 oz 310 mg   Milk, skim, low-fat, whole 8 oz 300 mg   Yogurt with fruit, low-fat 6 oz 260 mg   Mozzarella, part-skim 1 oz 210 mg   Cheddar 1 oz 205 mg   Yogurt, Greek 6 oz 200 mg   American Cheese 1 oz 195 mg   Feta Cheese 4 oz 140 mg   Cottage Cheese, 2% 4 oz 105 mg   Frozen yogurt, vanilla 8 oz 105 mg   Ice Cream, vanilla 8 oz 85 mg   Parmesan 1 tbsp 55 mg   Fortified Food Serving Size Estimated Calcium*   [place] milk, rice milk or soy milk, fortified 8 oz 300 mg   Orange juice and other fruit juices, fortified 8 oz 300 mg   Tofu, prepared with calcium 4 oz 205 mg   Waffle, frozen, fortified 2 pieces 200 mg   Oatmeal, fortified 1 packet 140 mg   English muffin, fortified 1 muffin 100 mg   Cereal, fortified 8 oz 100-1,000 mg   Other Serving Size Estimated Calcium*   Mac & cheese, frozen 1 package 325 mg   Pizza, cheese, frozen 1 serving 115 mg   Pudding, chocolate, prepared with 2% milk 4 oz 160 mg   Beans, baked, canned 4 oz 160 mg   *The calcium content listed for most foods is estimated and can vary due to multiple factors.  Check the food label to determine how much calcium is in a particular product.

## 2023-07-25 NOTE — PROGRESS NOTES
Established Patient Progress Note    CC: Follow up for secondary hyperparathyroidism and vitamin D deficiency    Impression & Plan:    Problem List Items Addressed This Visit        Endocrine    Secondary hyperparathyroidism ([address]) - Primary     There is increased calcium in her urine on most recent lab work. PTH has improved with increase dose of vitamin D and/or calcium increase. Unfortunately due to calcium in urine, will need to decrease calcium supplement from 1500 mg to 1000 mg daily and continue with dietary sources of calcium which is estimated to be around 600 mg/day. I have also provided her with a chart of calcium rich foods for her to tabulate how much calcium she is taking.   - repeat 24 hour urine calcium prior to next visit with next set of labs. Continue to remain hydrated. - I am hoping her PTH is more vitamin D driven and her PTH will continue to remain stable with current vitamin D supplement         Relevant Orders    Calcium, urine, 24 hour Lab Collect    Creatinine, urine, 24 hour Lab Collect       Other    Vitamin D deficiency     Sufficiently repleted on most recent lab work  - continue with 6,000 IU daily  - her PTH has improved from prior            Orders Placed This Encounter   Procedures   • Calcium, urine, 24 hour Lab Collect     Standing Status:   Future     Standing Expiration Date:   7/25/2024   • Creatinine, urine, 24 hour Lab Collect     Standing Status:   Future     Standing Expiration Date:   7/25/2024       History of Present Illness:   Hunter Wang is a 36 y.o. female with a history of secondary hyperparathyroidism secondary to malabsorption from gastric bypass surgery. She is currently taking 2 500 mg calcium chews per day. She sometimes forgets to take the second 500 mg chew. She is also taking 500 mg calcium citrate powder daily for a total of 1500 mg calcium. She also estimates having 2 slices of cheese per day and 1 yogurt daily.  She is also taking a MV with some calcium in it but she is not sure how much. She is taking 6,000 IU vitamin D supplement daily. She denies any falls, fractures or kidney stones. She stays hydrated. Patient Active Problem List   Diagnosis   • Allergic rhinitis   • Herpes simplex infection   • Low vitamin B12 level   • Postsurgical malabsorption   • Secondary hyperparathyroidism ([address])   • Bariatric surgery status   • Leukopenia   • Vitamin A deficiency   • Muscle spasm of back   • S/P right oophorectomy   • Health care maintenance   • Vitamin D deficiency   • Dyslipidemia      Past Medical History:   Diagnosis Date   • Allergic rhinitis 2013   • Anemia    • Anxiety    • Depression    • Fatty liver     resolved with weight loss    • Herpes     Last outbreak was within the past year but she doesn't remember when   • Hypertension     Resolved after bariatric surgery   • Mild heartburn     resolved   • Mitral regurgitation    • Murmur, cardiac     "slight" since childhood   • Ovarian cyst    • Pneumonia 2003    x1- double pneumonia and was hospitalized   • Transaminitis     liver enzymes elevated prior to gastric bypass   • Tricuspid regurgitation       Past Surgical History:   Procedure Laterality Date   •  SECTION      x1   • ENDOMETRIAL ABLATION     • ESOPHAGOGASTRODUODENOSCOPY N/A 2017    Procedure: ESOPHAGOGASTRODUODENOSCOPY (EGD); Surgeon: Roz Valencia MD;  Location: [facility];  Service:    • PELVIC LAPAROSCOPY Right 3/4/2019    Procedure: CYSTECTOMY OVARIAN, LAPAROSCOPIC;  Surgeon: Tabitha Salazar MD;  Location: [facility];  Service: Gynecology   • NH EGD TRANSORAL BIOPSY SINGLE/MULTIPLE N/A 3/1/2017    Procedure: ESOPHAGOGASTRODUODENOSCOPY (EGD) with gastric bx;  Surgeon: Roz Valencia MD;  Location: [facility];   Service: Bariatrics   • NH LAPS GSTR RSTCV PX W/BYP EMILIA-EN-Y LIMB <150 CM N/A 2017    Procedure: BYPASS GASTRIC  EMILIA-EN-Y LAPAROSCOPIC;  Surgeon: Roz Valencia MD;  Location: [facility];  Service: Bariatrics   • TUBAL LIGATION        Family History   Problem Relation Age of Onset   • Anxiety disorder Mother    • Depression Mother    • Leukemia Mother    • Other Mother         hysterectomy    • Hypertension Father    • Coronary artery disease Maternal Grandmother    • Heart failure Maternal Grandmother    • Diabetes type II Maternal Grandfather    • Stroke Maternal Grandfather    • Diabetes Paternal Grandmother    • Kidney failure Paternal Grandmother         on dialysis    • Diabetes type II Paternal Grandfather    • Diabetes Paternal Grandfather    • Allergies Son    • No Known Problems Maternal Aunt    • Breast cancer Neg Hx      Social History     Tobacco Use   • Smoking status: Never   • Smokeless tobacco: Never   Substance Use Topics   • Alcohol use: Not Currently     Comment: social     Allergies   Allergen Reactions   • Nsaids Other (See Comments)     Has had bariatric surgery and can't have.    • Wellbutrin [Bupropion] Other (See Comments) and Hyperactivity     "felt like she could climb the wall"         Current Outpatient Medications:   •  acetaminophen (TYLENOL) 500 mg tablet, Take 500 mg by mouth every 6 (six) hours as needed for mild pain, Disp: , Rfl:   •  Ascorbic Acid (vitamin C) 1000 MG tablet, Take 1,000 mg by mouth daily, Disp: , Rfl:   •  Calcium Citrate-Vitamin D 500-500 MG-UNT/5GM POWD, Take by mouth, Disp: , Rfl:   •  cholecalciferol (VITAMIN D3) 1,000 units tablet, Take 6,000 Units by mouth daily, Disp: , Rfl:   •  fexofenadine (ALLEGRA) 180 MG tablet, Take 180 mg by mouth daily, Disp: , Rfl:   •  FLUoxetine (PROzac) 40 MG capsule, TAKE 1 CAPSULE BY MOUTH EVERY DAY, Disp: 90 capsule, Rfl: 3  •  fluticasone (FLONASE) 50 mcg/act nasal spray, 2 sprays into each nostril as needed , Disp: , Rfl:   •  IRON PO, Take by mouth daily , Disp: , Rfl:   •  Multiple Vitamins-Minerals (BARIATRIC FUSION) CHEW, Chew, Disp: , Rfl:   •  valACYclovir (VALTREX) 500 mg tablet, TAKE 1 TABLET BY MOUTH EVERY DAY, Disp: 90 tablet, Rfl: 0  •  vitamin E, tocopherol, 400 units capsule, Take 400 Units by mouth daily, Disp: , Rfl:   •  zinc gluconate 50 mg tablet, Take 50 mg by mouth daily (Patient not taking: Reported on 7/25/2023), Disp: , Rfl:     Review of Systems   Constitutional: Negative for chills and fever. HENT: Negative for ear pain and sore throat. Eyes: Negative for pain and visual disturbance. Respiratory: Negative for cough and shortness of breath. Cardiovascular: Negative for chest pain and palpitations. Gastrointestinal: Negative for abdominal pain and vomiting. Genitourinary: Negative for dysuria and hematuria. Musculoskeletal: Negative for arthralgias and back pain. Skin: Negative for color change and rash. Neurological: Negative for seizures and syncope. All other systems reviewed and are negative. Physical Exam:  Body mass index is 27.19 kg/m². /94   Pulse 67   Ht 5' (1.524 m)   Wt 63.1 kg (139 lb 3.2 oz)   BMI 27.19 kg/m²    Wt Readings from Last 3 Encounters:   07/25/23 63.1 kg (139 lb 3.2 oz)   05/11/23 62.5 kg (137 lb 12.8 oz)   01/31/23 61.2 kg (135 lb)       Physical Exam  Vitals reviewed. Constitutional:       Appearance: Normal appearance. HENT:      Head: Normocephalic and atraumatic. Cardiovascular:      Rate and Rhythm: Normal rate. Heart sounds: Normal heart sounds. Pulmonary:      Effort: Pulmonary effort is normal.      Breath sounds: Normal breath sounds. Neurological:      Mental Status: She is alert and oriented to person, place, and time.    Psychiatric:         Mood and Affect: Mood normal.         Behavior: Behavior normal.         Labs:   No results found for: "HGBA1C"  Lab Results   Component Value Date    CREATININE 0.58 (L) 04/07/2023    CREATININE 0.59 (L) 11/08/2022    CREATININE 0.59 (L) 10/18/2021    BUN 10 04/07/2023     08/11/2016    K 3.8 04/07/2023     04/07/2023    CO2 27 04/07/2023     eGFR   Date Value Ref Range Status   04/07/2023 113 ml/min/1.73sq m Final     Lab Results   Component Value Date    CHOL 163 08/11/2016    HDL 66 11/08/2022    TRIG 47 11/08/2022     Lab Results   Component Value Date    ALT 20 11/08/2022    AST 19 11/08/2022    ALKPHOS 84 11/08/2022    BILITOT 0.6 02/20/2017     Lab Results   Component Value Date    GYG5UTMOMAIX 1.220 11/08/2022    VHF8JQIOKGWZ 1.580 12/30/2019    [id number] 2.870 03/22/2017     No results found for: "Arvilla Mart", "TSI"      Patient Instructions       A Guide to Calcium-Rich Foods  We all know that milk is a great source of calcium, but you may be surprised by all the different foods you can work into your diet to reach your daily recommended amount of calcium.  Use the guide below to get ideas of additional calcium-rich foods to add to your weekly shopping list.  Produce Serving Size Estimated Calcium*   Mohit greens, frozen 8 oz 360 mg   Broccoli sharan 8 oz 200 mg   Kale, frozen 8 oz 180 mg   Soy Beans, green, boiled 8 oz 175 mg   Bok Lacey, cooked, boiled 8 oz 160 mg   Figs, dried 2 figs 65 mg   Broccoli, fresh, cooked 8 oz 60 mg   Oranges 1 whole 55 mg   Seafood Serving Size Estimated Calcium*   Sardines, canned with bones 3 oz 325 mg   Dazey, canned with bones 3 oz 180 mg   Shrimp, canned 3 oz 125 mg   Dairy Serving Size Estimated Calcium*   Ricotta, part-skim 4 oz 335 mg   Yogurt, plain, low-fat 6 oz 310 mg   Milk, skim, low-fat, whole 8 oz 300 mg   Yogurt with fruit, low-fat 6 oz 260 mg   Mozzarella, part-skim 1 oz 210 mg   Cheddar 1 oz 205 mg   Yogurt, Greek 6 oz 200 mg   American Cheese 1 oz 195 mg   Feta Cheese 4 oz 140 mg   Cottage Cheese, 2% 4 oz 105 mg   Frozen yogurt, vanilla 8 oz 105 mg   Ice Cream, vanilla 8 oz 85 mg   Parmesan 1 tbsp 55 mg   Fortified Food Serving Size Estimated Calcium*   Branch milk, rice milk or soy milk, fortified 8 oz 300 mg   Orange juice and other fruit juices, fortified 8 oz 300 mg   Tofu, prepared with calcium 4 oz 205 mg Waffle, frozen, fortified 2 pieces 200 mg   Oatmeal, fortified 1 packet 140 mg   English muffin, fortified 1 muffin 100 mg   Cereal, fortified 8 oz 100-1,000 mg   Other Serving Size Estimated Calcium*   Mac & cheese, frozen 1 package 325 mg   Pizza, cheese, frozen 1 serving 115 mg   Pudding, chocolate, prepared with 2% milk 4 oz 160 mg   Beans, baked, canned 4 oz 160 mg   *The calcium content listed for most foods is estimated and can vary due to multiple factors. Check the food label to determine how much calcium is in a particular product. Discussed with the patient and all questioned fully answered. She will call me if any problems arise.

## 2023-07-25 NOTE — ASSESSMENT & PLAN NOTE
Sufficiently repleted on most recent lab work  - continue with 6,000 IU daily  - her PTH has improved from prior

## 2023-07-25 NOTE — ASSESSMENT & PLAN NOTE
There is increased calcium in her urine on most recent lab work. PTH has improved with increase dose of vitamin D and/or calcium increase. Unfortunately due to calcium in urine, will need to decrease calcium supplement from 1500 mg to 1000 mg daily and continue with dietary sources of calcium which is estimated to be around 600 mg/day. I have also provided her with a chart of calcium rich foods for her to tabulate how much calcium she is taking.   - repeat 24 hour urine calcium prior to next visit with next set of labs. Continue to remain hydrated.   - I am hoping her PTH is more vitamin D driven and her PTH will continue to remain stable with current vitamin D supplement

## 2023-08-29 ENCOUNTER — VBI (OUTPATIENT)
Dept: ADMINISTRATIVE | Facility: OTHER | Age: 43
End: 2023-08-29

## 2023-10-12 ENCOUNTER — CLINICAL SUPPORT (OUTPATIENT)
Dept: FAMILY MEDICINE CLINIC | Facility: CLINIC | Age: 43
End: 2023-10-12
Payer: COMMERCIAL

## 2023-10-12 DIAGNOSIS — Z23 NEEDS FLU SHOT: Primary | ICD-10-CM

## 2023-10-12 PROCEDURE — 90471 IMMUNIZATION ADMIN: CPT

## 2023-10-12 PROCEDURE — 90686 IIV4 VACC NO PRSV 0.5 ML IM: CPT

## 2023-11-27 ENCOUNTER — APPOINTMENT (OUTPATIENT)
Dept: LAB | Facility: CLINIC | Age: 43
End: 2023-11-27
Payer: COMMERCIAL

## 2023-11-27 DIAGNOSIS — F33.42 RECURRENT MAJOR DEPRESSIVE DISORDER, IN FULL REMISSION (HCC): ICD-10-CM

## 2023-11-27 DIAGNOSIS — E78.5 DYSLIPIDEMIA: ICD-10-CM

## 2023-11-27 DIAGNOSIS — D72.819 LEUKOPENIA, UNSPECIFIED TYPE: ICD-10-CM

## 2023-11-27 DIAGNOSIS — E50.9 VITAMIN A DEFICIENCY: ICD-10-CM

## 2023-11-27 DIAGNOSIS — R79.89 LOW VITAMIN B12 LEVEL: ICD-10-CM

## 2023-11-27 DIAGNOSIS — N25.81 SECONDARY HYPERPARATHYROIDISM (HCC): ICD-10-CM

## 2023-11-27 DIAGNOSIS — Z00.00 HEALTH CARE MAINTENANCE: ICD-10-CM

## 2023-11-27 DIAGNOSIS — Z12.31 ENCOUNTER FOR SCREENING MAMMOGRAM FOR BREAST CANCER: ICD-10-CM

## 2023-11-27 DIAGNOSIS — Z12.4 SCREENING FOR CERVICAL CANCER: ICD-10-CM

## 2023-11-27 DIAGNOSIS — E55.9 VITAMIN D DEFICIENCY: ICD-10-CM

## 2023-11-27 DIAGNOSIS — J30.9 ALLERGIC RHINITIS, UNSPECIFIED SEASONALITY, UNSPECIFIED TRIGGER: ICD-10-CM

## 2023-11-27 LAB
25(OH)D3 SERPL-MCNC: 44.4 NG/ML (ref 30–100)
ALBUMIN SERPL BCP-MCNC: 4.4 G/DL (ref 3.5–5)
ALP SERPL-CCNC: 78 U/L (ref 34–104)
ALT SERPL W P-5'-P-CCNC: 17 U/L (ref 7–52)
ANION GAP SERPL CALCULATED.3IONS-SCNC: 8 MMOL/L
AST SERPL W P-5'-P-CCNC: 20 U/L (ref 13–39)
BILIRUB SERPL-MCNC: 0.53 MG/DL (ref 0.2–1)
BILIRUB UR QL STRIP: NEGATIVE
BUN SERPL-MCNC: 8 MG/DL (ref 5–25)
CALCIUM 24H UR-MCNC: 9.31 MG/24 HRS (ref 100–300)
CALCIUM SERPL-MCNC: 8.7 MG/DL (ref 8.4–10.2)
CHLORIDE SERPL-SCNC: 102 MMOL/L (ref 96–108)
CHOLEST SERPL-MCNC: 149 MG/DL
CLARITY UR: CLEAR
CO2 SERPL-SCNC: 27 MMOL/L (ref 21–32)
COLOR UR: NORMAL
CREAT 24H UR-MRATE: 0 G/24HR (ref 0.6–1.8)
CREAT SERPL-MCNC: 0.55 MG/DL (ref 0.6–1.3)
ERYTHROCYTE [DISTWIDTH] IN BLOOD BY AUTOMATED COUNT: 11.5 % (ref 11.6–15.1)
FOLATE SERPL-MCNC: >22.3 NG/ML
GFR SERPL CREATININE-BSD FRML MDRD: 114 ML/MIN/1.73SQ M
GLUCOSE P FAST SERPL-MCNC: 89 MG/DL (ref 65–99)
GLUCOSE UR STRIP-MCNC: NEGATIVE MG/DL
HCT VFR BLD AUTO: 39.8 % (ref 34.8–46.1)
HDLC SERPL-MCNC: 65 MG/DL
HGB BLD-MCNC: 13.8 G/DL (ref 11.5–15.4)
HGB UR QL STRIP.AUTO: NEGATIVE
KETONES UR STRIP-MCNC: NEGATIVE MG/DL
LDLC SERPL CALC-MCNC: 73 MG/DL (ref 0–100)
LEUKOCYTE ESTERASE UR QL STRIP: NEGATIVE
MCH RBC QN AUTO: 30.9 PG (ref 26.8–34.3)
MCHC RBC AUTO-ENTMCNC: 34.7 G/DL (ref 31.4–37.4)
MCV RBC AUTO: 89 FL (ref 82–98)
NITRITE UR QL STRIP: NEGATIVE
PH UR STRIP.AUTO: 7 [PH]
PLATELET # BLD AUTO: 371 THOUSANDS/UL (ref 149–390)
PMV BLD AUTO: 10.4 FL (ref 8.9–12.7)
POTASSIUM SERPL-SCNC: 3.8 MMOL/L (ref 3.5–5.3)
PROT SERPL-MCNC: 6.6 G/DL (ref 6.4–8.4)
PROT UR STRIP-MCNC: NEGATIVE MG/DL
PTH-INTACT SERPL-MCNC: 90.9 PG/ML (ref 12–88)
RBC # BLD AUTO: 4.47 MILLION/UL (ref 3.81–5.12)
SODIUM SERPL-SCNC: 137 MMOL/L (ref 135–147)
SP GR UR STRIP.AUTO: 1.01 (ref 1–1.03)
SPECIMEN VOL UR: 70 ML
SPECIMEN VOL UR: 70 ML
TRIGL SERPL-MCNC: 55 MG/DL
TSH SERPL DL<=0.05 MIU/L-ACNC: 1.18 UIU/ML (ref 0.45–4.5)
UROBILINOGEN UR STRIP-ACNC: <2 MG/DL
VIT B12 SERPL-MCNC: 204 PG/ML (ref 180–914)
WBC # BLD AUTO: 4.74 THOUSAND/UL (ref 4.31–10.16)

## 2023-11-27 PROCEDURE — 82746 ASSAY OF FOLIC ACID SERUM: CPT

## 2023-11-27 PROCEDURE — 84443 ASSAY THYROID STIM HORMONE: CPT

## 2023-11-27 PROCEDURE — 85027 COMPLETE CBC AUTOMATED: CPT

## 2023-11-27 PROCEDURE — 80061 LIPID PANEL: CPT

## 2023-11-27 PROCEDURE — 80053 COMPREHEN METABOLIC PANEL: CPT

## 2023-11-27 PROCEDURE — 82340 ASSAY OF CALCIUM IN URINE: CPT

## 2023-11-27 PROCEDURE — 82570 ASSAY OF URINE CREATININE: CPT

## 2023-11-27 PROCEDURE — 36415 COLL VENOUS BLD VENIPUNCTURE: CPT

## 2023-11-27 PROCEDURE — 82607 VITAMIN B-12: CPT

## 2023-11-27 PROCEDURE — 82306 VITAMIN D 25 HYDROXY: CPT

## 2023-11-27 PROCEDURE — 83970 ASSAY OF PARATHORMONE: CPT

## 2023-11-27 PROCEDURE — 81003 URINALYSIS AUTO W/O SCOPE: CPT

## 2023-11-27 PROCEDURE — 84590 ASSAY OF VITAMIN A: CPT

## 2023-12-03 LAB — VIT A SERPL-MCNC: 27.2 UG/DL (ref 20.1–62)

## 2023-12-05 ENCOUNTER — VBI (OUTPATIENT)
Dept: ADMINISTRATIVE | Facility: OTHER | Age: 43
End: 2023-12-05

## 2023-12-18 DIAGNOSIS — F32.A DEPRESSION, UNSPECIFIED DEPRESSION TYPE: ICD-10-CM

## 2023-12-18 RX ORDER — FLUOXETINE HYDROCHLORIDE 40 MG/1
CAPSULE ORAL
Qty: 90 CAPSULE | Refills: 0 | Status: SHIPPED | OUTPATIENT
Start: 2023-12-18

## 2023-12-26 ENCOUNTER — OFFICE VISIT (OUTPATIENT)
Dept: FAMILY MEDICINE CLINIC | Facility: CLINIC | Age: 43
End: 2023-12-26
Payer: COMMERCIAL

## 2023-12-26 ENCOUNTER — OFFICE VISIT (OUTPATIENT)
Dept: ENDOCRINOLOGY | Facility: CLINIC | Age: 43
End: 2023-12-26
Payer: COMMERCIAL

## 2023-12-26 VITALS
WEIGHT: 143.4 LBS | BODY MASS INDEX: 28.16 KG/M2 | DIASTOLIC BLOOD PRESSURE: 78 MMHG | HEART RATE: 88 BPM | SYSTOLIC BLOOD PRESSURE: 128 MMHG | HEIGHT: 60 IN | OXYGEN SATURATION: 99 %

## 2023-12-26 VITALS
HEIGHT: 60 IN | OXYGEN SATURATION: 99 % | WEIGHT: 143 LBS | SYSTOLIC BLOOD PRESSURE: 128 MMHG | HEART RATE: 95 BPM | BODY MASS INDEX: 28.07 KG/M2 | RESPIRATION RATE: 16 BRPM | DIASTOLIC BLOOD PRESSURE: 72 MMHG

## 2023-12-26 DIAGNOSIS — E55.9 VITAMIN D DEFICIENCY: ICD-10-CM

## 2023-12-26 DIAGNOSIS — J30.9 ALLERGIC RHINITIS, UNSPECIFIED SEASONALITY, UNSPECIFIED TRIGGER: ICD-10-CM

## 2023-12-26 DIAGNOSIS — Z00.00 HEALTH CARE MAINTENANCE: ICD-10-CM

## 2023-12-26 DIAGNOSIS — Z98.84 BARIATRIC SURGERY STATUS: ICD-10-CM

## 2023-12-26 DIAGNOSIS — R79.89 LOW VITAMIN B12 LEVEL: ICD-10-CM

## 2023-12-26 DIAGNOSIS — N25.81 SECONDARY HYPERPARATHYROIDISM (HCC): ICD-10-CM

## 2023-12-26 DIAGNOSIS — D72.819 LEUKOPENIA, UNSPECIFIED TYPE: ICD-10-CM

## 2023-12-26 DIAGNOSIS — N25.81 SECONDARY HYPERPARATHYROIDISM (HCC): Primary | ICD-10-CM

## 2023-12-26 DIAGNOSIS — K91.2 POSTSURGICAL MALABSORPTION: Primary | ICD-10-CM

## 2023-12-26 DIAGNOSIS — E78.5 DYSLIPIDEMIA: ICD-10-CM

## 2023-12-26 DIAGNOSIS — E50.9 VITAMIN A DEFICIENCY: ICD-10-CM

## 2023-12-26 DIAGNOSIS — F33.42 RECURRENT MAJOR DEPRESSIVE DISORDER, IN FULL REMISSION (HCC): ICD-10-CM

## 2023-12-26 DIAGNOSIS — K91.2 POSTSURGICAL MALABSORPTION: ICD-10-CM

## 2023-12-26 PROBLEM — F32.A DEPRESSION: Status: ACTIVE | Noted: 2023-12-26

## 2023-12-26 PROCEDURE — 99213 OFFICE O/P EST LOW 20 MIN: CPT

## 2023-12-26 PROCEDURE — 99214 OFFICE O/P EST MOD 30 MIN: CPT | Performed by: FAMILY MEDICINE

## 2023-12-26 NOTE — PATIENT INSTRUCTIONS
24 Hour urine collection instructions     Please  a specimen container from the lab.      On day 1, urinate into the toilet when you wake up in the morning. Collect all of your urine in a special container for the next 24 hours. On day 2, urinate into the container in the morning when you wake up. Cap the container. Keep it in the refrigerator or a cool place during the collection period. Label the container with your name, the date, and the time you complete the sample, and return it as instructed

## 2023-12-26 NOTE — PROGRESS NOTES
Name: Tash Alonzo      : 1980      MRN: [id number]  Encounter Provider: Eloy Alonzo DO  Encounter Date: 2023   Encounter department: [facility]    Assessment & Plan     1.  Postsurgical malabsorption/bariatric surgery status status post surgery 2017  Continue to monitor blood work  2.  Secondary hyperparathyroidism, follow with endocrinology  3.  Dyslipidemia, stable continue to monitor  4.  Leukopenia, chronic/benign per hematology continue to monitor  5.  Vitamin deficiencies B12, A, D, stable continue to monitor  6.  Healthcare maintenance, COVID immunization refused #7.  Allergic rhinitis, stable continue Allegra/Flonase  8.  MDD, stable on Prozac  9.  Recheck 6 months.  If blood work is stable we will check on a yearly basis        1. Postsurgical malabsorption  Assessment & Plan:  Continue to monitor    Orders:  -     CBC and differential; Future; Expected date: 2024  -     Comprehensive metabolic panel; Future; Expected date: 2024  -     Lipid Panel with Direct LDL reflex; Future; Expected date: 2024  -     TSH, 3rd generation with Free T4 reflex; Future; Expected date: 2024  -     UA (URINE) with reflex to Scope; Future; Expected date: 2024  -     Vitamin D 25 hydroxy; Future; Expected date: 2024  -     Vitamin B12; Future; Expected date: 2024  -     Folate; Future; Expected date: 2024  -     Vitamin A; Future; Expected date: 2024    2. Secondary hyperparathyroidism (HCC)  Assessment & Plan:  Follow with endocrinology    Orders:  -     CBC and differential; Future; Expected date: 2024  -     Comprehensive metabolic panel; Future; Expected date: 2024  -     Lipid Panel with Direct LDL reflex; Future; Expected date: 2024  -     TSH, 3rd generation with Free T4 reflex; Future; Expected date: 2024  -     UA (URINE) with reflex to Scope; Future; Expected date: 2024  -      What Type Of Note Output Would You Prefer (Optional)?: Standard Output Is The Patient Presenting As Previously Scheduled?: Yes Vitamin D 25 hydroxy; Future; Expected date: 06/26/2024  -     Vitamin B12; Future; Expected date: 06/26/2024  -     Folate; Future; Expected date: 06/26/2024  -     Vitamin A; Future; Expected date: 06/26/2024    3. Bariatric surgery status  Assessment & Plan:  Status post surgery 2017    Orders:  -     CBC and differential; Future; Expected date: 06/26/2024  -     Comprehensive metabolic panel; Future; Expected date: 06/26/2024  -     Lipid Panel with Direct LDL reflex; Future; Expected date: 06/26/2024  -     TSH, 3rd generation with Free T4 reflex; Future; Expected date: 06/26/2024  -     UA (URINE) with reflex to Scope; Future; Expected date: 06/26/2024  -     Vitamin D 25 hydroxy; Future; Expected date: 06/26/2024  -     Vitamin B12; Future; Expected date: 06/26/2024  -     Folate; Future; Expected date: 06/26/2024  -     Vitamin A; Future; Expected date: 06/26/2024    4. Dyslipidemia  Assessment & Plan:  Stable continue to monitor    Orders:  -     CBC and differential; Future; Expected date: 06/26/2024  -     Comprehensive metabolic panel; Future; Expected date: 06/26/2024  -     Lipid Panel with Direct LDL reflex; Future; Expected date: 06/26/2024  -     TSH, 3rd generation with Free T4 reflex; Future; Expected date: 06/26/2024  -     UA (URINE) with reflex to Scope; Future; Expected date: 06/26/2024  -     Vitamin D 25 hydroxy; Future; Expected date: 06/26/2024  -     Vitamin B12; Future; Expected date: 06/26/2024  -     Folate; Future; Expected date: 06/26/2024  -     Vitamin A; Future; Expected date: 06/26/2024    5. Leukopenia, unspecified type  Assessment & Plan:  Continue to monitor    Orders:  -     CBC and differential; Future; Expected date: 06/26/2024  -     Comprehensive metabolic panel; Future; Expected date: 06/26/2024  -     Lipid Panel with Direct LDL reflex; Future; Expected date: 06/26/2024  -     TSH, 3rd generation with Free T4 reflex; Future; Expected date: 06/26/2024  -     UA (URINE)  How Severe Is Your Rash?: mild with reflex to Scope; Future; Expected date: 06/26/2024  -     Vitamin D 25 hydroxy; Future; Expected date: 06/26/2024  -     Vitamin B12; Future; Expected date: 06/26/2024  -     Folate; Future; Expected date: 06/26/2024  -     Vitamin A; Future; Expected date: 06/26/2024    6. Low vitamin B12 level  Assessment & Plan:  Stable continue to monitor    Orders:  -     CBC and differential; Future; Expected date: 06/26/2024  -     Comprehensive metabolic panel; Future; Expected date: 06/26/2024  -     Lipid Panel with Direct LDL reflex; Future; Expected date: 06/26/2024  -     TSH, 3rd generation with Free T4 reflex; Future; Expected date: 06/26/2024  -     UA (URINE) with reflex to Scope; Future; Expected date: 06/26/2024  -     Vitamin D 25 hydroxy; Future; Expected date: 06/26/2024  -     Vitamin B12; Future; Expected date: 06/26/2024  -     Folate; Future; Expected date: 06/26/2024  -     Vitamin A; Future; Expected date: 06/26/2024    7. Vitamin A deficiency  Assessment & Plan:  Stable continue to monitor    Orders:  -     CBC and differential; Future; Expected date: 06/26/2024  -     Comprehensive metabolic panel; Future; Expected date: 06/26/2024  -     Lipid Panel with Direct LDL reflex; Future; Expected date: 06/26/2024  -     TSH, 3rd generation with Free T4 reflex; Future; Expected date: 06/26/2024  -     UA (URINE) with reflex to Scope; Future; Expected date: 06/26/2024  -     Vitamin D 25 hydroxy; Future; Expected date: 06/26/2024  -     Vitamin B12; Future; Expected date: 06/26/2024  -     Folate; Future; Expected date: 06/26/2024  -     Vitamin A; Future; Expected date: 06/26/2024    8. Vitamin D deficiency  Assessment & Plan:  Stable continue to monitor    Orders:  -     CBC and differential; Future; Expected date: 06/26/2024  -     Comprehensive metabolic panel; Future; Expected date: 06/26/2024  -     Lipid Panel with Direct LDL reflex; Future; Expected date: 06/26/2024  -     TSH, 3rd generation with  Free T4 reflex; Future; Expected date: 06/26/2024  -     UA (URINE) with reflex to Scope; Future; Expected date: 06/26/2024  -     Vitamin D 25 hydroxy; Future; Expected date: 06/26/2024  -     Vitamin B12; Future; Expected date: 06/26/2024  -     Folate; Future; Expected date: 06/26/2024  -     Vitamin A; Future; Expected date: 06/26/2024    9. Health care maintenance  Assessment & Plan:  COVID booster refused    Orders:  -     CBC and differential; Future; Expected date: 06/26/2024  -     Comprehensive metabolic panel; Future; Expected date: 06/26/2024  -     Lipid Panel with Direct LDL reflex; Future; Expected date: 06/26/2024  -     TSH, 3rd generation with Free T4 reflex; Future; Expected date: 06/26/2024  -     UA (URINE) with reflex to Scope; Future; Expected date: 06/26/2024  -     Vitamin D 25 hydroxy; Future; Expected date: 06/26/2024  -     Vitamin B12; Future; Expected date: 06/26/2024  -     Folate; Future; Expected date: 06/26/2024  -     Vitamin A; Future; Expected date: 06/26/2024    10. Allergic rhinitis, unspecified seasonality, unspecified trigger  Assessment & Plan:  Stable on Lycra    Orders:  -     CBC and differential; Future; Expected date: 06/26/2024  -     Comprehensive metabolic panel; Future; Expected date: 06/26/2024  -     Lipid Panel with Direct LDL reflex; Future; Expected date: 06/26/2024  -     TSH, 3rd generation with Free T4 reflex; Future; Expected date: 06/26/2024  -     UA (URINE) with reflex to Scope; Future; Expected date: 06/26/2024  -     Vitamin D 25 hydroxy; Future; Expected date: 06/26/2024  -     Vitamin B12; Future; Expected date: 06/26/2024  -     Folate; Future; Expected date: 06/26/2024  -     Vitamin A; Future; Expected date: 06/26/2024    11. Recurrent major depressive disorder, in full remission (HCC)  Assessment & Plan:  Stable on Prozac    Orders:  -     CBC and differential; Future; Expected date: 06/26/2024  -     Comprehensive metabolic panel; Future;  Is This A New Presentation, Or A Follow-Up?: Rash Expected date: 06/26/2024  -     Lipid Panel with Direct LDL reflex; Future; Expected date: 06/26/2024  -     TSH, 3rd generation with Free T4 reflex; Future; Expected date: 06/26/2024  -     UA (URINE) with reflex to Scope; Future; Expected date: 06/26/2024  -     Vitamin D 25 hydroxy; Future; Expected date: 06/26/2024  -     Vitamin B12; Future; Expected date: 06/26/2024  -     Folate; Future; Expected date: 06/26/2024  -     Vitamin A; Future; Expected date: 06/26/2024    BMI Counseling: Body mass index is 27.93 kg/m². The BMI is above normal. Nutrition recommendations include moderation in carbohydrate intake and reducing intake of cholesterol. Exercise recommendations include exercising 3-5 times per week. Rationale for BMI follow-up plan is due to patient being overweight or obese.     Depression Screening and Follow-up Plan: Patient was screened for depression during today's encounter. They screened negative with a PHQ-2 score of 0.        Subjective      Patient doing well following with endocrinology for PTH levels.      Review of Systems   Constitutional: Negative.    HENT: Negative.     Eyes: Negative.    Respiratory: Negative.     Cardiovascular: Negative.    Gastrointestinal: Negative.    Endocrine:        HPI   Genitourinary: Negative.    Musculoskeletal: Negative.    Skin: Negative.    Allergic/Immunologic: Negative.    Neurological: Negative.    Hematological: Negative.    Psychiatric/Behavioral: Negative.         Current Outpatient Medications on File Prior to Visit   Medication Sig   • acetaminophen (TYLENOL) 500 mg tablet Take 500 mg by mouth every 6 (six) hours as needed for mild pain   • Ascorbic Acid (vitamin C) 1000 MG tablet Take 1,000 mg by mouth daily   • Calcium Citrate-Vitamin D 500-500 MG-UNT/5GM POWD Take by mouth   • cholecalciferol (VITAMIN D3) 1,000 units tablet Take 6,000 Units by mouth daily   • fexofenadine (ALLEGRA) 180 MG tablet Take 180 mg by mouth daily   • FLUoxetine (PROzac)  40 MG capsule TAKE 1 CAPSULE BY MOUTH EVERY DAY   • fluticasone (FLONASE) 50 mcg/act nasal spray 2 sprays into each nostril as needed    • IRON PO Take by mouth daily    • Multiple Vitamins-Minerals (BARIATRIC FUSION) CHEW Chew   • valACYclovir (VALTREX) 500 mg tablet TAKE 1 TABLET BY MOUTH EVERY DAY   • vitamin E, tocopherol, 400 units capsule Take 400 Units by mouth daily   • [DISCONTINUED] zinc gluconate 50 mg tablet Take 50 mg by mouth daily (Patient not taking: Reported on 7/25/2023)       Objective     /72 (BP Location: Right arm, Patient Position: Sitting, Cuff Size: Large)   Pulse 95   Resp 16   Ht 5' (1.524 m)   Wt 64.9 kg (143 lb)   SpO2 99%   BMI 27.93 kg/m²     Physical Exam  Vitals and nursing note reviewed.   Constitutional:       Appearance: Normal appearance.   HENT:      Head: Normocephalic and atraumatic.      Mouth/Throat:      Mouth: Mucous membranes are moist.   Eyes:      General: No scleral icterus.  Neck:      Vascular: No carotid bruit.   Cardiovascular:      Rate and Rhythm: Normal rate and regular rhythm.      Heart sounds: Normal heart sounds.   Pulmonary:      Effort: Pulmonary effort is normal.      Breath sounds: Normal breath sounds.   Abdominal:      General: Bowel sounds are normal.      Palpations: Abdomen is soft.      Tenderness: There is no abdominal tenderness.   Musculoskeletal:      Cervical back: Neck supple.      Right lower leg: No edema.      Left lower leg: No edema.   Skin:     General: Skin is warm and dry.   Neurological:      General: No focal deficit present.      Mental Status: She is alert.   Psychiatric:         Mood and Affect: Mood normal.       Eloy Alonzo DO

## 2023-12-26 NOTE — PROGRESS NOTES
Established Patient Progress Note    CC: Follow up for secondary hyperparathyroidism following gastric bypass surgery    Impression & Plan:    Problem List Items Addressed This Visit          Digestive    Postsurgical malabsorption    Relevant Orders    Calcium, urine, 24 hour Lab Collect    Creatinine, urine, 24 hour Lab Collect    Calcium Lab Collect    Albumin Lab Collect       Endocrine    Secondary hyperparathyroidism (HCC) - Primary     11/27/23 lab work:  Calcium corrected to 8.4.  PTH 90.9  Vitamin D 44.4    Patient reports she was not taking her calcium supplement regularly at the time of prior lab work. 24 hour urine results inaccurately reported     Plan:  Continue with prior regimen  Repeat 24 hour urine and calcium/albumin now that she has resumed prior dosing of calcium supplement  Continue vitamin D supplement         Relevant Orders    Calcium, urine, 24 hour Lab Collect    Creatinine, urine, 24 hour Lab Collect    Calcium Lab Collect    Albumin Lab Collect       Orders Placed This Encounter   Procedures    Calcium, urine, 24 hour Lab Collect     Standing Status:   Future     Standing Expiration Date:   12/26/2024    Creatinine, urine, 24 hour Lab Collect     Standing Status:   Future     Standing Expiration Date:   12/26/2024    Calcium Lab Collect     Standing Status:   Future     Standing Expiration Date:   12/26/2024    Albumin Lab Collect     Standing Status:   Future     Standing Expiration Date:   12/26/2024       History of Present Illness:     Tash Alonzo is a 43 y.o. female with a history of secondary hyperparathyroidism following gastric bypass surgery.  Patient had gastric bypass surgery in 2017. She is currently taking 1800 mg of calcium between her supplement and multivitamin.  She does admit to not taking her calcium supplement regularly at the time of her last lab work. She reports getting back on track in the last month. She denies any numbness, tingling or muscle cramps. Her  "most recent corrected calcium on lab work is 8.4. S She is taking 6000 IU/day of vitamin D3.  Her most recent vitamin D level was 44.4. She has no complaints today.    Patient Active Problem List   Diagnosis    Allergic rhinitis    Herpes simplex infection    Low vitamin B12 level    Postsurgical malabsorption    Secondary hyperparathyroidism (HCC)    Bariatric surgery status    Leukopenia    Vitamin A deficiency    Muscle spasm of back    S/P right oophorectomy    Health care maintenance    Vitamin D deficiency    Dyslipidemia      Past Medical History:   Diagnosis Date    Allergic rhinitis 2013    Anemia     Anxiety     Depression     Fatty liver     resolved with weight loss     Herpes     Last outbreak was within the past year but she doesn't remember when    Hypertension     Resolved after bariatric surgery    Mild heartburn     resolved    Mitral regurgitation     Murmur, cardiac     \"slight\" since childhood    Ovarian cyst     Pneumonia 2003    x1- double pneumonia and was hospitalized    Transaminitis     liver enzymes elevated prior to gastric bypass    Tricuspid regurgitation       Past Surgical History:   Procedure Laterality Date     SECTION      x1    ENDOMETRIAL ABLATION      ESOPHAGOGASTRODUODENOSCOPY N/A 2017    Procedure: ESOPHAGOGASTRODUODENOSCOPY (EGD);  Surgeon: Yenni Franklin MD;  Location: [facility];  Service:     PELVIC LAPAROSCOPY Right 3/4/2019    Procedure: CYSTECTOMY OVARIAN, LAPAROSCOPIC;  Surgeon: Niki Benitez MD;  Location: [facility];  Service: Gynecology    [place] EGD TRANSORAL BIOPSY SINGLE/MULTIPLE N/A 3/1/2017    Procedure: ESOPHAGOGASTRODUODENOSCOPY (EGD) with gastric bx;  Surgeon: Yenni Franklin MD;  Location: [facility];  Service: Bariatrics    WI LAPS GSTR RSTCV PX W/BYP EMILIA-EN-Y LIMB <150 CM N/A 2017    Procedure: BYPASS GASTRIC  EMILIA-EN-Y LAPAROSCOPIC;  Surgeon: Yenni Franklin MD;  Location: [facility];  Service: Bariatrics    TUBAL LIGATION        Family " "History   Problem Relation Age of Onset    Anxiety disorder Mother     Depression Mother     Leukemia Mother     Other Mother         hysterectomy     Hypertension Father     Coronary artery disease Maternal Grandmother     Heart failure Maternal Grandmother     Diabetes type II Maternal Grandfather     Stroke Maternal Grandfather     Diabetes Paternal Grandmother     Kidney failure Paternal Grandmother         on dialysis     Diabetes type II Paternal Grandfather     Diabetes Paternal Grandfather     Allergies Son     No Known Problems Maternal Aunt     Breast cancer Neg Hx      Social History     Tobacco Use    Smoking status: Never    Smokeless tobacco: Never   Substance Use Topics    Alcohol use: Not Currently     Comment: social     Allergies   Allergen Reactions    Nsaids Other (See Comments)     Has had bariatric surgery and can't have.    Wellbutrin [Bupropion] Other (See Comments) and Hyperactivity     \"felt like she could climb the wall\"         Current Outpatient Medications:     acetaminophen (TYLENOL) 500 mg tablet, Take 500 mg by mouth every 6 (six) hours as needed for mild pain, Disp: , Rfl:     Ascorbic Acid (vitamin C) 1000 MG tablet, Take 1,000 mg by mouth daily, Disp: , Rfl:     Calcium Citrate-Vitamin D 500-500 MG-UNT/5GM POWD, Take by mouth, Disp: , Rfl:     cholecalciferol (VITAMIN D3) 1,000 units tablet, Take 6,000 Units by mouth daily, Disp: , Rfl:     fexofenadine (ALLEGRA) 180 MG tablet, Take 180 mg by mouth daily, Disp: , Rfl:     FLUoxetine (PROzac) 40 MG capsule, TAKE 1 CAPSULE BY MOUTH EVERY DAY, Disp: 90 capsule, Rfl: 0    fluticasone (FLONASE) 50 mcg/act nasal spray, 2 sprays into each nostril as needed , Disp: , Rfl:     IRON PO, Take by mouth daily , Disp: , Rfl:     Multiple Vitamins-Minerals (BARIATRIC FUSION) CHEW, Chew, Disp: , Rfl:     vitamin E, tocopherol, 400 units capsule, Take 400 Units by mouth daily, Disp: , Rfl:     valACYclovir (VALTREX) 500 mg tablet, TAKE 1 TABLET BY " "MOUTH EVERY DAY, Disp: 90 tablet, Rfl: 0    zinc gluconate 50 mg tablet, Take 50 mg by mouth daily (Patient not taking: Reported on 7/25/2023), Disp: , Rfl:     Review of Systems   Constitutional:  Negative for chills and fever.   HENT:  Negative for ear pain and sore throat.    Eyes:  Negative for pain and visual disturbance.   Respiratory:  Negative for cough and shortness of breath.    Cardiovascular:  Negative for chest pain and palpitations.   Gastrointestinal:  Negative for abdominal pain and vomiting.   Genitourinary:  Negative for dysuria and hematuria.   Musculoskeletal:  Negative for arthralgias and back pain.   Skin:  Negative for color change and rash.   Neurological:  Negative for seizures and syncope.   All other systems reviewed and are negative.      Physical Exam:  Body mass index is 28.01 kg/m².  /78 (BP Location: Left arm, Patient Position: Sitting, Cuff Size: Adult)   Pulse 88   Ht 5' (1.524 m)   Wt 65 kg (143 lb 6.4 oz)   SpO2 99%   BMI 28.01 kg/m²    Wt Readings from Last 3 Encounters:   12/26/23 65 kg (143 lb 6.4 oz)   07/25/23 63.1 kg (139 lb 3.2 oz)   05/11/23 62.5 kg (137 lb 12.8 oz)       Physical Exam  Vitals reviewed.   Constitutional:       Appearance: Normal appearance.   HENT:      Head: Normocephalic and atraumatic.   Cardiovascular:      Rate and Rhythm: Normal rate.      Heart sounds: Normal heart sounds.   Pulmonary:      Effort: Pulmonary effort is normal.      Breath sounds: Normal breath sounds.   Neurological:      Mental Status: She is alert and oriented to person, place, and time.   Psychiatric:         Mood and Affect: Mood normal.         Behavior: Behavior normal.         Labs:   No results found for: \"HGBA1C\"  Lab Results   Component Value Date    CREATININE 0.55 (L) 11/27/2023    CREATININE 0.58 (L) 04/07/2023    CREATININE 0.59 (L) 11/08/2022    BUN 8 11/27/2023     08/11/2016    K 3.8 11/27/2023     11/27/2023    CO2 27 11/27/2023     eGFR " "  Date Value Ref Range Status   11/27/2023 114 ml/min/1.73sq m Final     Lab Results   Component Value Date    CHOL 163 08/11/2016    HDL 65 11/27/2023    TRIG 55 11/27/2023     Lab Results   Component Value Date    ALT 17 11/27/2023    AST 20 11/27/2023    ALKPHOS 78 11/27/2023    BILITOT 0.6 02/20/2017     Lab Results   Component Value Date    BSH1SKEBUMAQ 1.181 11/27/2023    KUC3SWJLLQNP 1.220 11/08/2022    FGY0XQDZVYLU 1.580 12/30/2019     No results found for: \"FREET4\", \"TSI\"      Patient Instructions   24 Hour urine collection instructions     Please  a specimen container from the lab.      On day 1, urinate into the toilet when you wake up in the morning. Collect all of your urine in a special container for the next 24 hours. On day 2, urinate into the container in the morning when you wake up. Cap the container. Keep it in the refrigerator or a cool place during the collection period. Label the container with your name, the date, and the time you complete the sample, and return it as instructed        Discussed with the patient and all questioned fully answered. She will call me if any problems arise.    "

## 2023-12-26 NOTE — ASSESSMENT & PLAN NOTE
11/27/23 lab work:  Calcium corrected to 8.4.  PTH 90.9  Vitamin D 44.4    Patient reports she was not taking her calcium supplement regularly at the time of prior lab work. 24 hour urine results inaccurately reported     Plan:  Continue with prior regimen  Repeat 24 hour urine and calcium/albumin now that she has resumed prior dosing of calcium supplement  Continue vitamin D supplement

## 2023-12-29 ENCOUNTER — APPOINTMENT (OUTPATIENT)
Dept: LAB | Facility: CLINIC | Age: 43
End: 2023-12-29
Payer: COMMERCIAL

## 2023-12-29 DIAGNOSIS — N25.81 SECONDARY HYPERPARATHYROIDISM (HCC): ICD-10-CM

## 2023-12-29 DIAGNOSIS — K91.2 POSTSURGICAL MALABSORPTION: ICD-10-CM

## 2023-12-29 LAB
CALCIUM 24H UR-MCNC: 345.45 MG/24 HRS (ref 100–300)
CREAT 24H UR-MRATE: 1.2 G/24HR (ref 0.6–1.8)
SPECIMEN VOL UR: 2450 ML
SPECIMEN VOL UR: 2450 ML

## 2023-12-29 PROCEDURE — 82340 ASSAY OF CALCIUM IN URINE: CPT

## 2023-12-29 PROCEDURE — 82570 ASSAY OF URINE CREATININE: CPT

## 2024-01-02 DIAGNOSIS — R82.994 HYPERCALCIURIA: ICD-10-CM

## 2024-01-02 DIAGNOSIS — N25.81 SECONDARY HYPERPARATHYROIDISM (HCC): Primary | ICD-10-CM

## 2024-01-04 ENCOUNTER — TELEPHONE (OUTPATIENT)
Dept: ENDOCRINOLOGY | Facility: CLINIC | Age: 44
End: 2024-01-04

## 2024-01-05 NOTE — TELEPHONE ENCOUNTER
Pt DAVID requesting call back after 2 pm. 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

## 2024-02-21 PROBLEM — Z00.00 HEALTH CARE MAINTENANCE: Status: RESOLVED | Noted: 2019-10-25 | Resolved: 2024-02-21

## 2024-03-17 DIAGNOSIS — F32.A DEPRESSION, UNSPECIFIED DEPRESSION TYPE: ICD-10-CM

## 2024-03-17 RX ORDER — FLUOXETINE HYDROCHLORIDE 40 MG/1
CAPSULE ORAL
Qty: 90 CAPSULE | Refills: 0 | Status: SHIPPED | OUTPATIENT
Start: 2024-03-17

## 2024-04-01 ENCOUNTER — APPOINTMENT (OUTPATIENT)
Dept: LAB | Facility: CLINIC | Age: 44
End: 2024-04-01
Payer: COMMERCIAL

## 2024-04-01 DIAGNOSIS — R82.994 HYPERCALCIURIA: ICD-10-CM

## 2024-04-01 DIAGNOSIS — N25.81 SECONDARY HYPERPARATHYROIDISM (HCC): ICD-10-CM

## 2024-04-01 LAB
CALCIUM 24H UR-MCNC: 186.15 MG/24 HRS (ref 100–300)
CREAT 24H UR-MRATE: 1.1 G/24HR (ref 0.6–1.8)
SPECIMEN VOL UR: 2550 ML
SPECIMEN VOL UR: 2550 ML

## 2024-04-01 PROCEDURE — 82340 ASSAY OF CALCIUM IN URINE: CPT

## 2024-04-01 PROCEDURE — 82570 ASSAY OF URINE CREATININE: CPT

## 2024-04-08 ENCOUNTER — TELEPHONE (OUTPATIENT)
Dept: ENDOCRINOLOGY | Facility: CLINIC | Age: 44
End: 2024-04-08

## 2024-04-08 DIAGNOSIS — N25.81 SECONDARY HYPERPARATHYROIDISM (HCC): Primary | ICD-10-CM

## 2024-06-10 ENCOUNTER — APPOINTMENT (OUTPATIENT)
Dept: LAB | Facility: CLINIC | Age: 44
End: 2024-06-10
Payer: COMMERCIAL

## 2024-06-10 DIAGNOSIS — F33.42 RECURRENT MAJOR DEPRESSIVE DISORDER, IN FULL REMISSION (HCC): ICD-10-CM

## 2024-06-10 DIAGNOSIS — Z00.00 HEALTH CARE MAINTENANCE: ICD-10-CM

## 2024-06-10 DIAGNOSIS — Z98.84 BARIATRIC SURGERY STATUS: ICD-10-CM

## 2024-06-10 DIAGNOSIS — D72.819 LEUKOPENIA, UNSPECIFIED TYPE: ICD-10-CM

## 2024-06-10 DIAGNOSIS — N25.81 SECONDARY HYPERPARATHYROIDISM (HCC): ICD-10-CM

## 2024-06-10 DIAGNOSIS — E55.9 VITAMIN D DEFICIENCY: ICD-10-CM

## 2024-06-10 DIAGNOSIS — E78.5 DYSLIPIDEMIA: ICD-10-CM

## 2024-06-10 DIAGNOSIS — E50.9 VITAMIN A DEFICIENCY: ICD-10-CM

## 2024-06-10 DIAGNOSIS — R79.89 LOW VITAMIN B12 LEVEL: ICD-10-CM

## 2024-06-10 DIAGNOSIS — K91.2 POSTSURGICAL MALABSORPTION: ICD-10-CM

## 2024-06-10 DIAGNOSIS — J30.9 ALLERGIC RHINITIS, UNSPECIFIED SEASONALITY, UNSPECIFIED TRIGGER: ICD-10-CM

## 2024-06-10 LAB
25(OH)D3 SERPL-MCNC: 55 NG/ML (ref 30–100)
ALBUMIN SERPL BCP-MCNC: 4.4 G/DL (ref 3.5–5)
ALP SERPL-CCNC: 73 U/L (ref 34–104)
ALT SERPL W P-5'-P-CCNC: 16 U/L (ref 7–52)
ANION GAP SERPL CALCULATED.3IONS-SCNC: 6 MMOL/L (ref 4–13)
AST SERPL W P-5'-P-CCNC: 18 U/L (ref 13–39)
BASOPHILS # BLD AUTO: 0.05 THOUSANDS/ÂΜL (ref 0–0.1)
BASOPHILS NFR BLD AUTO: 1 % (ref 0–1)
BILIRUB SERPL-MCNC: 0.54 MG/DL (ref 0.2–1)
BILIRUB UR QL STRIP: NEGATIVE
BUN SERPL-MCNC: 9 MG/DL (ref 5–25)
CALCIUM SERPL-MCNC: 9.2 MG/DL (ref 8.4–10.2)
CHLORIDE SERPL-SCNC: 103 MMOL/L (ref 96–108)
CHOLEST SERPL-MCNC: 138 MG/DL
CLARITY UR: CLEAR
CO2 SERPL-SCNC: 28 MMOL/L (ref 21–32)
COLOR UR: COLORLESS
CREAT SERPL-MCNC: 0.57 MG/DL (ref 0.6–1.3)
EOSINOPHIL # BLD AUTO: 0.07 THOUSAND/ÂΜL (ref 0–0.61)
EOSINOPHIL NFR BLD AUTO: 2 % (ref 0–6)
ERYTHROCYTE [DISTWIDTH] IN BLOOD BY AUTOMATED COUNT: 11.9 % (ref 11.6–15.1)
FOLATE SERPL-MCNC: >22.3 NG/ML
GFR SERPL CREATININE-BSD FRML MDRD: 113 ML/MIN/1.73SQ M
GLUCOSE P FAST SERPL-MCNC: 89 MG/DL (ref 65–99)
GLUCOSE UR STRIP-MCNC: NEGATIVE MG/DL
HCT VFR BLD AUTO: 38.7 % (ref 34.8–46.1)
HDLC SERPL-MCNC: 64 MG/DL
HGB BLD-MCNC: 12.8 G/DL (ref 11.5–15.4)
HGB UR QL STRIP.AUTO: NEGATIVE
IMM GRANULOCYTES # BLD AUTO: 0 THOUSAND/UL (ref 0–0.2)
IMM GRANULOCYTES NFR BLD AUTO: 0 % (ref 0–2)
KETONES UR STRIP-MCNC: NEGATIVE MG/DL
LDLC SERPL CALC-MCNC: 63 MG/DL (ref 0–100)
LEUKOCYTE ESTERASE UR QL STRIP: NEGATIVE
LYMPHOCYTES # BLD AUTO: 1.76 THOUSANDS/ÂΜL (ref 0.6–4.47)
LYMPHOCYTES NFR BLD AUTO: 42 % (ref 14–44)
MCH RBC QN AUTO: 30.6 PG (ref 26.8–34.3)
MCHC RBC AUTO-ENTMCNC: 33.1 G/DL (ref 31.4–37.4)
MCV RBC AUTO: 93 FL (ref 82–98)
MONOCYTES # BLD AUTO: 0.45 THOUSAND/ÂΜL (ref 0.17–1.22)
MONOCYTES NFR BLD AUTO: 11 % (ref 4–12)
NEUTROPHILS # BLD AUTO: 1.85 THOUSANDS/ÂΜL (ref 1.85–7.62)
NEUTS SEG NFR BLD AUTO: 44 % (ref 43–75)
NITRITE UR QL STRIP: NEGATIVE
NRBC BLD AUTO-RTO: 0 /100 WBCS
PH UR STRIP.AUTO: 6.5 [PH]
PLATELET # BLD AUTO: 341 THOUSANDS/UL (ref 149–390)
PMV BLD AUTO: 10.5 FL (ref 8.9–12.7)
POTASSIUM SERPL-SCNC: 3.5 MMOL/L (ref 3.5–5.3)
PROT SERPL-MCNC: 6.7 G/DL (ref 6.4–8.4)
PROT UR STRIP-MCNC: NEGATIVE MG/DL
PTH-INTACT SERPL-MCNC: 69.9 PG/ML (ref 12–88)
RBC # BLD AUTO: 4.18 MILLION/UL (ref 3.81–5.12)
SODIUM SERPL-SCNC: 137 MMOL/L (ref 135–147)
SP GR UR STRIP.AUTO: 1 (ref 1–1.03)
TRIGL SERPL-MCNC: 55 MG/DL
TSH SERPL DL<=0.05 MIU/L-ACNC: 2.04 UIU/ML (ref 0.45–4.5)
UROBILINOGEN UR STRIP-ACNC: <2 MG/DL
VIT B12 SERPL-MCNC: 213 PG/ML (ref 180–914)
WBC # BLD AUTO: 4.18 THOUSAND/UL (ref 4.31–10.16)

## 2024-06-10 PROCEDURE — 80053 COMPREHEN METABOLIC PANEL: CPT

## 2024-06-10 PROCEDURE — 82607 VITAMIN B-12: CPT

## 2024-06-10 PROCEDURE — 80061 LIPID PANEL: CPT

## 2024-06-10 PROCEDURE — 85025 COMPLETE CBC W/AUTO DIFF WBC: CPT

## 2024-06-10 PROCEDURE — 83970 ASSAY OF PARATHORMONE: CPT

## 2024-06-10 PROCEDURE — 82746 ASSAY OF FOLIC ACID SERUM: CPT

## 2024-06-10 PROCEDURE — 81003 URINALYSIS AUTO W/O SCOPE: CPT

## 2024-06-10 PROCEDURE — 82306 VITAMIN D 25 HYDROXY: CPT

## 2024-06-10 PROCEDURE — 36415 COLL VENOUS BLD VENIPUNCTURE: CPT

## 2024-06-10 PROCEDURE — 84443 ASSAY THYROID STIM HORMONE: CPT

## 2024-06-10 PROCEDURE — 84590 ASSAY OF VITAMIN A: CPT

## 2024-06-12 ENCOUNTER — OFFICE VISIT (OUTPATIENT)
Dept: ENDOCRINOLOGY | Facility: CLINIC | Age: 44
End: 2024-06-12
Payer: COMMERCIAL

## 2024-06-12 VITALS
HEIGHT: 60 IN | BODY MASS INDEX: 25.45 KG/M2 | SYSTOLIC BLOOD PRESSURE: 128 MMHG | WEIGHT: 129.6 LBS | DIASTOLIC BLOOD PRESSURE: 78 MMHG

## 2024-06-12 DIAGNOSIS — E55.9 VITAMIN D DEFICIENCY: ICD-10-CM

## 2024-06-12 DIAGNOSIS — N25.81 SECONDARY HYPERPARATHYROIDISM (HCC): Primary | ICD-10-CM

## 2024-06-12 LAB — VIT A SERPL-MCNC: 34.2 UG/DL (ref 20.1–62)

## 2024-06-12 PROCEDURE — 99214 OFFICE O/P EST MOD 30 MIN: CPT | Performed by: PHYSICIAN ASSISTANT

## 2024-06-12 NOTE — PATIENT INSTRUCTIONS
Based on PTH, Calcium, and 24-hour urine calcium you seem to be getting enough calcium in your diet.  Continue to aim for at least 1500mg of calcium per day including your bariatric multivitamin and dietary calcium intake  Your multivitamin has 600mg calcium.     [email or web address]

## 2024-06-12 NOTE — PROGRESS NOTES
"  Established Patient Progress Note     CC: Endocrinology follow up visit    Impression & Plan:    Problem List Items Addressed This Visit        Endocrine    Secondary hyperparathyroidism (HCC) - Primary     PTH, Calcium, and 24-hour urine calcium normal  Continue Bariatric multivitamin and dietary calcium intake  Plan to follow up with PCP but return to endocrinology if needed.          Relevant Orders    Comprehensive metabolic panel    PTH, intact       Other    Vitamin D deficiency     Continue supplements.               History of Present Illness:     Tash Alonzo is a 43 y.o. female with a history of secondary hyperparathyroidism following gastric bypass surgery.  Patient had gastric bypass surgery in 2017. She is currently taking     Since last visit, she is off calcium supplements comletely but she is taking bariatric multivitamin and trying to get a lot of calcium in her diet. (Cheese yogurt, greens, etc)  Urinary calcium was high but improved, denies kidney stones.     Bariatric fusion vitamin takes 2 per day (total of 600mg Calcium)    For Vitamin D Deficiency, taking supplement       Patient Active Problem List   Diagnosis   • Allergic rhinitis   • Herpes simplex infection   • Low vitamin B12 level   • Postsurgical malabsorption   • Secondary hyperparathyroidism (HCC)   • Bariatric surgery status   • Leukopenia   • Vitamin A deficiency   • Muscle spasm of back   • S/P right oophorectomy   • Vitamin D deficiency   • Dyslipidemia   • Recurrent major depressive disorder, in full remission (HCC)      Past Medical History:   Diagnosis Date   • Allergic rhinitis 08/09/2013   • Anemia    • Anxiety    • Depression    • Fatty liver     resolved with weight loss    • Herpes     Last outbreak was within the past year but she doesn't remember when   • Hypertension     Resolved after bariatric surgery   • Mild heartburn     resolved   • Mitral regurgitation    • Murmur, cardiac     \"slight\" since childhood   • " Ovarian cyst    • Pneumonia 2003    x1- double pneumonia and was hospitalized   • Transaminitis     liver enzymes elevated prior to gastric bypass   • Tricuspid regurgitation       Past Surgical History:   Procedure Laterality Date   •  SECTION      x1   • ENDOMETRIAL ABLATION     • ESOPHAGOGASTRODUODENOSCOPY N/A 2017    Procedure: ESOPHAGOGASTRODUODENOSCOPY (EGD);  Surgeon: Yenni Franklin MD;  Location: [facility];  Service:    • PELVIC LAPAROSCOPY Right 3/4/2019    Procedure: CYSTECTOMY OVARIAN, LAPAROSCOPIC;  Surgeon: Niki Benitez MD;  Location: [facility];  Service: Gynecology   • [place] EGD TRANSORAL BIOPSY SINGLE/MULTIPLE N/A 3/1/2017    Procedure: ESOPHAGOGASTRODUODENOSCOPY (EGD) with gastric bx;  Surgeon: Yenni Franklin MD;  Location: [facility];  Service: Bariatrics   • [place] LAPS GSTR RSTCV PX W/BYP EMILIA-EN-Y LIMB <150 CM N/A 2017    Procedure: BYPASS GASTRIC  EMILIA-EN-Y LAPAROSCOPIC;  Surgeon: Yenni Franklin MD;  Location: [facility];  Service: Bariatrics   • TUBAL LIGATION        Family History   Problem Relation Age of Onset   • Anxiety disorder Mother    • Depression Mother    • Leukemia Mother    • Other Mother         hysterectomy    • Hypertension Father    • Coronary artery disease Maternal Grandmother    • Heart failure Maternal Grandmother    • Diabetes type II Maternal Grandfather    • Stroke Maternal Grandfather    • Diabetes Paternal Grandmother    • Kidney failure Paternal Grandmother         on dialysis    • Diabetes type II Paternal Grandfather    • Diabetes Paternal Grandfather    • Allergies Son    • No Known Problems Maternal Aunt    • Breast cancer Neg Hx      Social History     Tobacco Use   • Smoking status: Never     Passive exposure: Past   • Smokeless tobacco: Never   Substance Use Topics   • Alcohol use: Not Currently     Comment: social     Allergies   Allergen Reactions   • Nsaids Other (See Comments)     Has had bariatric surgery and can't have.   • Wellbutrin  "[Bupropion] Other (See Comments) and Hyperactivity     \"felt like she could climb the wall\"       Current Outpatient Medications:   •  Multiple Vitamins-Minerals (Bariatric Fusion) CHEW, Takes 2--- which has 300mg calcium per tab (600mg calcium total), Disp: 60 tablet, Rfl: 1  •  acetaminophen (TYLENOL) 500 mg tablet, Take 500 mg by mouth every 6 (six) hours as needed for mild pain, Disp: , Rfl:   •  Ascorbic Acid (vitamin C) 1000 MG tablet, Take 1,000 mg by mouth daily, Disp: , Rfl:   •  cholecalciferol (VITAMIN D3) 1,000 units tablet, Take 6,000 Units by mouth daily, Disp: , Rfl:   •  fexofenadine (ALLEGRA) 180 MG tablet, Take 180 mg by mouth daily, Disp: , Rfl:   •  FLUoxetine (PROzac) 40 MG capsule, TAKE 1 CAPSULE BY MOUTH EVERY DAY, Disp: 90 capsule, Rfl: 0  •  fluticasone (FLONASE) 50 mcg/act nasal spray, 2 sprays into each nostril as needed , Disp: , Rfl:   •  IRON PO, Take by mouth daily , Disp: , Rfl:   •  valACYclovir (VALTREX) 500 mg tablet, TAKE 1 TABLET BY MOUTH EVERY DAY, Disp: 90 tablet, Rfl: 0  •  vitamin E, tocopherol, 400 units capsule, Take 400 Units by mouth daily, Disp: , Rfl:     Review of Systems   Constitutional:  Negative for activity change, appetite change, chills, diaphoresis, fatigue, fever and unexpected weight change.   HENT:  Negative for trouble swallowing and voice change.    Eyes:  Negative for visual disturbance.   Respiratory:  Negative for shortness of breath.    Cardiovascular:  Negative for chest pain and palpitations.   Gastrointestinal:  Negative for abdominal pain, constipation and diarrhea.   Endocrine: Negative for cold intolerance, heat intolerance, polydipsia, polyphagia and polyuria.   Genitourinary:  Negative for frequency and menstrual problem.   Musculoskeletal:  Negative for arthralgias and myalgias.   Skin:  Negative for rash.   Allergic/Immunologic: Negative for food allergies.   Neurological:  Negative for dizziness and tremors.   Hematological:  Negative for " adenopathy.   Psychiatric/Behavioral:  Negative for sleep disturbance.    All other systems reviewed and are negative.      Physical Exam:  Body mass index is 25.31 kg/m².  /78 (BP Location: Left arm, Patient Position: Sitting, Cuff Size: Adult)   Ht 5' (1.524 m)   Wt 58.8 kg (129 lb 9.6 oz)   BMI 25.31 kg/m²    Wt Readings from Last 3 Encounters:   06/12/24 58.8 kg (129 lb 9.6 oz)   12/26/23 64.9 kg (143 lb)   12/26/23 65 kg (143 lb 6.4 oz)       Physical Exam  Vitals reviewed.   Constitutional:       General: She is not in acute distress.     Appearance: Normal appearance. She is well-developed. She is not toxic-appearing.   HENT:      Head: Normocephalic and atraumatic.   Eyes:      Conjunctiva/sclera: Conjunctivae normal.      Pupils: Pupils are equal, round, and reactive to light.   Neck:      Thyroid: No thyromegaly.   Cardiovascular:      Rate and Rhythm: Normal rate and regular rhythm.      Heart sounds: Normal heart sounds.   Pulmonary:      Effort: Pulmonary effort is normal. No respiratory distress.      Breath sounds: Normal breath sounds. No wheezing or rales.   Abdominal:      General: Bowel sounds are normal. There is no distension.      Palpations: Abdomen is soft.      Tenderness: There is no abdominal tenderness.   Musculoskeletal:         General: Normal range of motion.      Cervical back: Normal range of motion and neck supple.   Skin:     General: Skin is warm and dry.   Neurological:      Mental Status: She is alert and oriented to person, place, and time.   Psychiatric:         Mood and Affect: Mood normal.         Behavior: Behavior normal.         Labs:   Component      Latest Ref Rng 4/1/2024 6/10/2024   Sodium      135 - 147 mmol/L  137    Potassium      3.5 - 5.3 mmol/L  3.5    Chloride      96 - 108 mmol/L  103    Carbon Dioxide      21 - 32 mmol/L  28    ANION GAP      4 - 13 mmol/L  6    BUN      5 - 25 mg/dL  9    Creatinine      0.60 - 1.30 mg/dL  0.57 (L)    GLUCOSE,  FASTING      65 - 99 mg/dL  89    Calcium      8.4 - 10.2 mg/dL  9.2    AST      13 - 39 U/L  18    ALT      7 - 52 U/L  16    ALK PHOS      34 - 104 U/L  73    Total Protein      6.4 - 8.4 g/dL  6.7    Albumin      3.5 - 5.0 g/dL  4.4    Total Bilirubin      0.20 - 1.00 mg/dL  0.54    GFR, Calculated      ml/min/1.73sq m  113    Creatinine, Urine      0.6 - 1.8 g/24Hr 1.1     Urine Total Volume      ml 2,550     24 HR URINE VOLUME      mL 2,550     CALCIUM 24 HOUR URINE      100 - 300 mg/24 hrs 186.15     TSH 3RD GENERATON      0.450 - 4.500 uIU/mL  2.035    VITAMIN D, 25-HYDROXY      30.0 - 100.0 ng/mL  55.0    PTH      12.0 - 88.0 pg/mL  69.9       Legend:  (L) Low    Discussed with the patient and all questioned fully answered. She will call me if any problems arise.    Follow-up appointment if needed     Counseled patient on diagnostic results, prognosis, risk and benefit of treatment options, instruction for management, importance of treatment compliance, Risk  factor reduction and impressions    Daryl Jean PA-C

## 2024-06-13 NOTE — ASSESSMENT & PLAN NOTE
PTH, Calcium, and 24-hour urine calcium normal  Continue Bariatric multivitamin and dietary calcium intake  Plan to follow up with PCP but return to endocrinology if needed.

## 2024-06-16 DIAGNOSIS — F32.A DEPRESSION, UNSPECIFIED DEPRESSION TYPE: ICD-10-CM

## 2024-06-16 RX ORDER — FLUOXETINE HYDROCHLORIDE 40 MG/1
CAPSULE ORAL
Qty: 90 CAPSULE | Refills: 1 | Status: SHIPPED | OUTPATIENT
Start: 2024-06-16

## 2024-06-21 ENCOUNTER — OFFICE VISIT (OUTPATIENT)
Dept: FAMILY MEDICINE CLINIC | Facility: CLINIC | Age: 44
End: 2024-06-21
Payer: COMMERCIAL

## 2024-06-21 VITALS
SYSTOLIC BLOOD PRESSURE: 100 MMHG | HEART RATE: 73 BPM | OXYGEN SATURATION: 98 % | DIASTOLIC BLOOD PRESSURE: 70 MMHG | HEIGHT: 60 IN | WEIGHT: 131 LBS | BODY MASS INDEX: 25.72 KG/M2

## 2024-06-21 DIAGNOSIS — E50.9 VITAMIN A DEFICIENCY: ICD-10-CM

## 2024-06-21 DIAGNOSIS — N25.81 SECONDARY HYPERPARATHYROIDISM (HCC): ICD-10-CM

## 2024-06-21 DIAGNOSIS — E55.9 VITAMIN D DEFICIENCY: ICD-10-CM

## 2024-06-21 DIAGNOSIS — Z83.719 FAMILY HISTORY OF COLONIC POLYPS: ICD-10-CM

## 2024-06-21 DIAGNOSIS — D72.819 LEUKOPENIA, UNSPECIFIED TYPE: ICD-10-CM

## 2024-06-21 DIAGNOSIS — Z12.31 ENCOUNTER FOR SCREENING MAMMOGRAM FOR BREAST CANCER: ICD-10-CM

## 2024-06-21 DIAGNOSIS — K91.2 POSTSURGICAL MALABSORPTION: ICD-10-CM

## 2024-06-21 DIAGNOSIS — R79.89 LOW VITAMIN B12 LEVEL: ICD-10-CM

## 2024-06-21 DIAGNOSIS — E78.5 DYSLIPIDEMIA: ICD-10-CM

## 2024-06-21 DIAGNOSIS — Z12.11 SCREEN FOR COLON CANCER: ICD-10-CM

## 2024-06-21 DIAGNOSIS — F33.42 RECURRENT MAJOR DEPRESSIVE DISORDER, IN FULL REMISSION (HCC): Primary | ICD-10-CM

## 2024-06-21 PROBLEM — Z80.0 FAMILY HISTORY OF COLON CANCER: Status: ACTIVE | Noted: 2024-06-21

## 2024-06-21 PROBLEM — Z80.0 FAMILY HISTORY OF COLON CANCER: Status: RESOLVED | Noted: 2024-06-21 | Resolved: 2024-06-21

## 2024-06-21 PROCEDURE — 99214 OFFICE O/P EST MOD 30 MIN: CPT | Performed by: FAMILY MEDICINE

## 2024-06-21 NOTE — PATIENT INSTRUCTIONS
Continue present therapy  Follows endocrinology per their recommendations  Patient to return in 1 year for office visit and blood work sooner if needed

## 2024-06-21 NOTE — PROGRESS NOTES
Ambulatory Visit  Name: Tash Alonzo      : 1980      MRN: [id number]  Encounter Provider: Eloy Alonzo DO  Encounter Date: 2024   Encounter department: [facility] PRIMARY CARE    1.  MDD, in remission on Prozac discussed decreasing dose or weaning off patient declines as she would like to remain on this.  2.  Family history of colon polyps/screening for colon cancer refer to GI for colonoscopy  3.  Postsurgical malabsorption, stable continue to monitor  4.  Hyperparathyroidism, follows with endocrinology appears stable  6.  For leukopenia chronic/benign continue to monitor  December dyslipidemia diet controlled  7..  Vitamin deficiencies A, D, B12, stable continue to monitor  8.  Patient wishes to commit on yearly patient is stable I believe this is acceptable patient to return in 1 year for office visit and blood work sooner if needed  Assessment & Plan   1. Recurrent major depressive disorder, in full remission (HCC)  Assessment & Plan:  Well-controlled in remission with fluoxetine patient declines any decrease in dose or weaning off  Orders:  -     CBC and differential; Future; Expected date: 2025  -     Comprehensive metabolic panel; Future; Expected date: 2025  -     Lipid Panel with Direct LDL reflex; Future; Expected date: 2025  -     TSH, 3rd generation with Free T4 reflex; Future; Expected date: 2025  -     UA (URINE) with reflex to Scope; Future; Expected date: 2025  -     Vitamin D 25 hydroxy; Future; Expected date: 2025  -     Vitamin B12; Future; Expected date: 2025  -     Folate; Future; Expected date: 2025  -     Vitamin A; Future; Expected date: 2025  2. Encounter for screening mammogram for breast cancer  -     Mammo screening bilateral w 3d & cad; Future; Expected date: 2024  -     CBC and differential; Future; Expected date: 2025  -     Comprehensive metabolic panel; Future; Expected date:  06/02/2025  -     Lipid Panel with Direct LDL reflex; Future; Expected date: 06/02/2025  -     TSH, 3rd generation with Free T4 reflex; Future; Expected date: 06/02/2025  -     UA (URINE) with reflex to Scope; Future; Expected date: 06/02/2025  -     Vitamin D 25 hydroxy; Future; Expected date: 06/02/2025  -     Vitamin B12; Future; Expected date: 06/02/2025  -     Folate; Future; Expected date: 06/02/2025  -     Vitamin A; Future; Expected date: 06/02/2025  3. Screen for colon cancer  -     Ambulatory Referral to Gastroenterology; Future  -     CBC and differential; Future; Expected date: 06/02/2025  -     Comprehensive metabolic panel; Future; Expected date: 06/02/2025  -     Lipid Panel with Direct LDL reflex; Future; Expected date: 06/02/2025  -     TSH, 3rd generation with Free T4 reflex; Future; Expected date: 06/02/2025  -     UA (URINE) with reflex to Scope; Future; Expected date: 06/02/2025  -     Vitamin D 25 hydroxy; Future; Expected date: 06/02/2025  -     Vitamin B12; Future; Expected date: 06/02/2025  -     Folate; Future; Expected date: 06/02/2025  -     Vitamin A; Future; Expected date: 06/02/2025  4. Postsurgical malabsorption  Assessment & Plan:  Stable, will check blood work yearly    Orders:  -     CBC and differential; Future; Expected date: 06/02/2025  -     Comprehensive metabolic panel; Future; Expected date: 06/02/2025  -     Lipid Panel with Direct LDL reflex; Future; Expected date: 06/02/2025  -     TSH, 3rd generation with Free T4 reflex; Future; Expected date: 06/02/2025  -     UA (URINE) with reflex to Scope; Future; Expected date: 06/02/2025  -     Vitamin D 25 hydroxy; Future; Expected date: 06/02/2025  -     Vitamin B12; Future; Expected date: 06/02/2025  -     Folate; Future; Expected date: 06/02/2025  -     Vitamin A; Future; Expected date: 06/02/2025  5. Secondary hyperparathyroidism (HCC)  Assessment & Plan:  Appears stable follows with endocrinology  Orders:  -     CBC and  differential; Future; Expected date: 06/02/2025  -     Comprehensive metabolic panel; Future; Expected date: 06/02/2025  -     Lipid Panel with Direct LDL reflex; Future; Expected date: 06/02/2025  -     TSH, 3rd generation with Free T4 reflex; Future; Expected date: 06/02/2025  -     UA (URINE) with reflex to Scope; Future; Expected date: 06/02/2025  -     Vitamin D 25 hydroxy; Future; Expected date: 06/02/2025  -     Vitamin B12; Future; Expected date: 06/02/2025  -     Folate; Future; Expected date: 06/02/2025  -     Vitamin A; Future; Expected date: 06/02/2025  6. Leukopenia, unspecified type  Assessment & Plan:  Chronic/benign per hematology continue to monitor on a yearly basis  Orders:  -     CBC and differential; Future; Expected date: 06/02/2025  -     Comprehensive metabolic panel; Future; Expected date: 06/02/2025  -     Lipid Panel with Direct LDL reflex; Future; Expected date: 06/02/2025  -     TSH, 3rd generation with Free T4 reflex; Future; Expected date: 06/02/2025  -     UA (URINE) with reflex to Scope; Future; Expected date: 06/02/2025  -     Vitamin D 25 hydroxy; Future; Expected date: 06/02/2025  -     Vitamin B12; Future; Expected date: 06/02/2025  -     Folate; Future; Expected date: 06/02/2025  -     Vitamin A; Future; Expected date: 06/02/2025  7. Dyslipidemia  Assessment & Plan:  Diet controlled monitor on yearly basis  Orders:  -     CBC and differential; Future; Expected date: 06/02/2025  -     Comprehensive metabolic panel; Future; Expected date: 06/02/2025  -     Lipid Panel with Direct LDL reflex; Future; Expected date: 06/02/2025  -     TSH, 3rd generation with Free T4 reflex; Future; Expected date: 06/02/2025  -     UA (URINE) with reflex to Scope; Future; Expected date: 06/02/2025  -     Vitamin D 25 hydroxy; Future; Expected date: 06/02/2025  -     Vitamin B12; Future; Expected date: 06/02/2025  -     Folate; Future; Expected date: 06/02/2025  -     Vitamin A; Future; Expected date:  06/02/2025  8. Vitamin A deficiency  Assessment & Plan:  Stable monitor on yearly basis  Orders:  -     CBC and differential; Future; Expected date: 06/02/2025  -     Comprehensive metabolic panel; Future; Expected date: 06/02/2025  -     Lipid Panel with Direct LDL reflex; Future; Expected date: 06/02/2025  -     TSH, 3rd generation with Free T4 reflex; Future; Expected date: 06/02/2025  -     UA (URINE) with reflex to Scope; Future; Expected date: 06/02/2025  -     Vitamin D 25 hydroxy; Future; Expected date: 06/02/2025  -     Vitamin B12; Future; Expected date: 06/02/2025  -     Folate; Future; Expected date: 06/02/2025  -     Vitamin A; Future; Expected date: 06/02/2025  9. Family history of colonic polyps  Assessment & Plan:  For to GI for colonoscopy  Orders:  -     Ambulatory Referral to Gastroenterology; Future  -     CBC and differential; Future; Expected date: 06/02/2025  -     Comprehensive metabolic panel; Future; Expected date: 06/02/2025  -     Lipid Panel with Direct LDL reflex; Future; Expected date: 06/02/2025  -     TSH, 3rd generation with Free T4 reflex; Future; Expected date: 06/02/2025  -     UA (URINE) with reflex to Scope; Future; Expected date: 06/02/2025  -     Vitamin D 25 hydroxy; Future; Expected date: 06/02/2025  -     Vitamin B12; Future; Expected date: 06/02/2025  -     Folate; Future; Expected date: 06/02/2025  -     Vitamin A; Future; Expected date: 06/02/2025  10. Low vitamin B12 level  Assessment & Plan:  Stable will monitor on a yearly basis    Orders:  -     CBC and differential; Future; Expected date: 06/02/2025  -     Comprehensive metabolic panel; Future; Expected date: 06/02/2025  -     Lipid Panel with Direct LDL reflex; Future; Expected date: 06/02/2025  -     TSH, 3rd generation with Free T4 reflex; Future; Expected date: 06/02/2025  -     UA (URINE) with reflex to Scope; Future; Expected date: 06/02/2025  -     Vitamin D 25 hydroxy; Future; Expected date: 06/02/2025  -      Vitamin B12; Future; Expected date: 06/02/2025  -     Folate; Future; Expected date: 06/02/2025  -     Vitamin A; Future; Expected date: 06/02/2025  11. Vitamin D deficiency  Assessment & Plan:  Stable monitor on a yearly basis  Orders:  -     CBC and differential; Future; Expected date: 06/02/2025  -     Comprehensive metabolic panel; Future; Expected date: 06/02/2025  -     Lipid Panel with Direct LDL reflex; Future; Expected date: 06/02/2025  -     TSH, 3rd generation with Free T4 reflex; Future; Expected date: 06/02/2025  -     UA (URINE) with reflex to Scope; Future; Expected date: 06/02/2025  -     Vitamin D 25 hydroxy; Future; Expected date: 06/02/2025  -     Vitamin B12; Future; Expected date: 06/02/2025  -     Folate; Future; Expected date: 06/02/2025  -     Vitamin A; Future; Expected date: 06/02/2025      Depression Screening and Follow-up Plan: Patient was screened for depression during today's encounter. They screened negative with a PHQ-9 score of 0.      History of Present Illness     Patient doing well with Vitamed complaints concerns at the present time.  Patient is lost 13 pounds since December.        Review of Systems   Constitutional: Negative.    HENT: Negative.     Eyes: Negative.    Respiratory: Negative.     Cardiovascular: Negative.    Gastrointestinal: Negative.    Endocrine: Negative.    Genitourinary: Negative.    Musculoskeletal: Negative.    Skin: Negative.    Allergic/Immunologic: Negative.    Neurological: Negative.    Hematological: Negative.    Psychiatric/Behavioral: Negative.         Objective     /70   Pulse 73   Ht 5' (1.524 m)   Wt 59.4 kg (131 lb)   SpO2 98%   BMI 25.58 kg/m²     Physical Exam  Vitals and nursing note reviewed.   Constitutional:       Appearance: Normal appearance.   HENT:      Head: Normocephalic and atraumatic.      Mouth/Throat:      Mouth: Mucous membranes are moist.   Eyes:      General: No scleral icterus.  Neck:      Vascular: No carotid  bruit.   Cardiovascular:      Rate and Rhythm: Normal rate and regular rhythm.      Heart sounds: Normal heart sounds.   Pulmonary:      Effort: Pulmonary effort is normal.      Breath sounds: Normal breath sounds.   Abdominal:      General: Bowel sounds are normal.      Palpations: Abdomen is soft.      Tenderness: There is no abdominal tenderness.   Musculoskeletal:      Cervical back: Neck supple.      Right lower leg: No edema.      Left lower leg: No edema.   Skin:     General: Skin is warm and dry.   Neurological:      General: No focal deficit present.      Mental Status: She is alert.   Psychiatric:         Mood and Affect: Mood normal.       Administrative Statements

## 2024-12-07 DIAGNOSIS — F32.A DEPRESSION, UNSPECIFIED DEPRESSION TYPE: ICD-10-CM

## 2024-12-09 RX ORDER — FLUOXETINE 40 MG/1
40 CAPSULE ORAL DAILY
Qty: 90 CAPSULE | Refills: 1 | Status: SHIPPED | OUTPATIENT
Start: 2024-12-09

## 2025-02-21 ENCOUNTER — OFFICE VISIT (OUTPATIENT)
Dept: FAMILY MEDICINE CLINIC | Facility: CLINIC | Age: 45
End: 2025-02-21
Payer: COMMERCIAL

## 2025-02-21 ENCOUNTER — RESULTS FOLLOW-UP (OUTPATIENT)
Dept: ENDOCRINOLOGY | Facility: CLINIC | Age: 45
End: 2025-02-21

## 2025-02-21 ENCOUNTER — APPOINTMENT (OUTPATIENT)
Dept: LAB | Facility: CLINIC | Age: 45
End: 2025-02-21
Payer: COMMERCIAL

## 2025-02-21 VITALS
OXYGEN SATURATION: 99 % | DIASTOLIC BLOOD PRESSURE: 80 MMHG | SYSTOLIC BLOOD PRESSURE: 128 MMHG | HEIGHT: 60 IN | HEART RATE: 66 BPM | WEIGHT: 139.8 LBS | BODY MASS INDEX: 27.45 KG/M2

## 2025-02-21 DIAGNOSIS — F33.42 RECURRENT MAJOR DEPRESSIVE DISORDER, IN FULL REMISSION (HCC): ICD-10-CM

## 2025-02-21 DIAGNOSIS — R79.89 LOW VITAMIN B12 LEVEL: ICD-10-CM

## 2025-02-21 DIAGNOSIS — N25.81 SECONDARY HYPERPARATHYROIDISM (HCC): ICD-10-CM

## 2025-02-21 DIAGNOSIS — Z12.31 ENCOUNTER FOR SCREENING MAMMOGRAM FOR BREAST CANCER: ICD-10-CM

## 2025-02-21 DIAGNOSIS — K91.2 POSTSURGICAL MALABSORPTION: ICD-10-CM

## 2025-02-21 DIAGNOSIS — R53.82 CHRONIC FATIGUE: ICD-10-CM

## 2025-02-21 DIAGNOSIS — E55.9 VITAMIN D DEFICIENCY: ICD-10-CM

## 2025-02-21 DIAGNOSIS — E50.9 VITAMIN A DEFICIENCY: ICD-10-CM

## 2025-02-21 DIAGNOSIS — Z12.4 SCREENING FOR CERVICAL CANCER: ICD-10-CM

## 2025-02-21 DIAGNOSIS — Z83.719 FAMILY HISTORY OF COLONIC POLYPS: Primary | ICD-10-CM

## 2025-02-21 DIAGNOSIS — Z12.11 SCREENING FOR COLON CANCER: ICD-10-CM

## 2025-02-21 DIAGNOSIS — D72.819 LEUKOPENIA, UNSPECIFIED TYPE: ICD-10-CM

## 2025-02-21 DIAGNOSIS — D70.9 NEUTROPENIA, UNSPECIFIED TYPE (HCC): Primary | ICD-10-CM

## 2025-02-21 LAB
25(OH)D3 SERPL-MCNC: 41.9 NG/ML (ref 30–100)
ALBUMIN SERPL BCG-MCNC: 4.5 G/DL (ref 3.5–5)
ALP SERPL-CCNC: 89 U/L (ref 34–104)
ALT SERPL W P-5'-P-CCNC: 22 U/L (ref 7–52)
ANION GAP SERPL CALCULATED.3IONS-SCNC: 9 MMOL/L (ref 4–13)
AST SERPL W P-5'-P-CCNC: 22 U/L (ref 13–39)
BASOPHILS # BLD AUTO: 0.04 THOUSANDS/ΜL (ref 0–0.1)
BASOPHILS NFR BLD AUTO: 1 % (ref 0–1)
BILIRUB SERPL-MCNC: 0.63 MG/DL (ref 0.2–1)
BILIRUB UR QL STRIP: NEGATIVE
BUN SERPL-MCNC: 8 MG/DL (ref 5–25)
CALCIUM SERPL-MCNC: 9.3 MG/DL (ref 8.4–10.2)
CHLORIDE SERPL-SCNC: 101 MMOL/L (ref 96–108)
CLARITY UR: CLEAR
CO2 SERPL-SCNC: 29 MMOL/L (ref 21–32)
COLOR UR: COLORLESS
CREAT SERPL-MCNC: 0.53 MG/DL (ref 0.6–1.3)
EOSINOPHIL # BLD AUTO: 0.03 THOUSAND/ΜL (ref 0–0.61)
EOSINOPHIL NFR BLD AUTO: 1 % (ref 0–6)
ERYTHROCYTE [DISTWIDTH] IN BLOOD BY AUTOMATED COUNT: 11.9 % (ref 11.6–15.1)
FERRITIN SERPL-MCNC: 33 NG/ML (ref 11–307)
FOLATE SERPL-MCNC: >22.3 NG/ML
GFR SERPL CREATININE-BSD FRML MDRD: 115 ML/MIN/1.73SQ M
GLUCOSE P FAST SERPL-MCNC: 90 MG/DL (ref 65–99)
GLUCOSE UR STRIP-MCNC: NEGATIVE MG/DL
HCT VFR BLD AUTO: 41.8 % (ref 34.8–46.1)
HGB BLD-MCNC: 13.6 G/DL (ref 11.5–15.4)
HGB UR QL STRIP.AUTO: NEGATIVE
IMM GRANULOCYTES # BLD AUTO: 0 THOUSAND/UL (ref 0–0.2)
IMM GRANULOCYTES NFR BLD AUTO: 0 % (ref 0–2)
IRON SERPL-MCNC: 131 UG/DL (ref 50–212)
KETONES UR STRIP-MCNC: NEGATIVE MG/DL
LEUKOCYTE ESTERASE UR QL STRIP: NEGATIVE
LYMPHOCYTES # BLD AUTO: 1.57 THOUSANDS/ΜL (ref 0.6–4.47)
LYMPHOCYTES NFR BLD AUTO: 44 % (ref 14–44)
MCH RBC QN AUTO: 29.9 PG (ref 26.8–34.3)
MCHC RBC AUTO-ENTMCNC: 32.5 G/DL (ref 31.4–37.4)
MCV RBC AUTO: 92 FL (ref 82–98)
MONOCYTES # BLD AUTO: 0.29 THOUSAND/ΜL (ref 0.17–1.22)
MONOCYTES NFR BLD AUTO: 8 % (ref 4–12)
NEUTROPHILS # BLD AUTO: 1.62 THOUSANDS/ΜL (ref 1.85–7.62)
NEUTS SEG NFR BLD AUTO: 46 % (ref 43–75)
NITRITE UR QL STRIP: NEGATIVE
NRBC BLD AUTO-RTO: 0 /100 WBCS
PH UR STRIP.AUTO: 7 [PH]
PLATELET # BLD AUTO: 356 THOUSANDS/UL (ref 149–390)
PMV BLD AUTO: 10.4 FL (ref 8.9–12.7)
POTASSIUM SERPL-SCNC: 4.4 MMOL/L (ref 3.5–5.3)
PROT SERPL-MCNC: 7 G/DL (ref 6.4–8.4)
PROT UR STRIP-MCNC: NEGATIVE MG/DL
PTH-INTACT SERPL-MCNC: 105.3 PG/ML (ref 12–88)
RBC # BLD AUTO: 4.55 MILLION/UL (ref 3.81–5.12)
SODIUM SERPL-SCNC: 139 MMOL/L (ref 135–147)
SP GR UR STRIP.AUTO: 1 (ref 1–1.03)
TSH SERPL DL<=0.05 MIU/L-ACNC: 1.74 UIU/ML (ref 0.45–4.5)
UROBILINOGEN UR STRIP-ACNC: <2 MG/DL
VIT B12 SERPL-MCNC: 225 PG/ML (ref 180–914)
WBC # BLD AUTO: 3.55 THOUSAND/UL (ref 4.31–10.16)

## 2025-02-21 PROCEDURE — 84590 ASSAY OF VITAMIN A: CPT

## 2025-02-21 PROCEDURE — 82607 VITAMIN B-12: CPT

## 2025-02-21 PROCEDURE — 80053 COMPREHEN METABOLIC PANEL: CPT

## 2025-02-21 PROCEDURE — 82746 ASSAY OF FOLIC ACID SERUM: CPT

## 2025-02-21 PROCEDURE — 82306 VITAMIN D 25 HYDROXY: CPT

## 2025-02-21 PROCEDURE — 85025 COMPLETE CBC W/AUTO DIFF WBC: CPT

## 2025-02-21 PROCEDURE — 84443 ASSAY THYROID STIM HORMONE: CPT

## 2025-02-21 PROCEDURE — 83970 ASSAY OF PARATHORMONE: CPT

## 2025-02-21 PROCEDURE — 36415 COLL VENOUS BLD VENIPUNCTURE: CPT

## 2025-02-21 PROCEDURE — 81003 URINALYSIS AUTO W/O SCOPE: CPT

## 2025-02-21 PROCEDURE — 99214 OFFICE O/P EST MOD 30 MIN: CPT | Performed by: FAMILY MEDICINE

## 2025-02-21 PROCEDURE — 83540 ASSAY OF IRON: CPT

## 2025-02-21 PROCEDURE — 82728 ASSAY OF FERRITIN: CPT

## 2025-02-21 RX ORDER — FLUOXETINE HYDROCHLORIDE 60 MG/1
60 TABLET, FILM COATED ORAL; ORAL DAILY
Qty: 30 TABLET | Refills: 5 | Status: SHIPPED | OUTPATIENT
Start: 2025-02-21

## 2025-02-21 NOTE — ASSESSMENT & PLAN NOTE
Blood work ordered  Orders:  •  CBC and differential; Future  •  Comprehensive metabolic panel; Future  •  TSH, 3rd generation with Free T4 reflex; Future  •  UA (URINE) with reflex to Scope; Future  •  Vitamin B12; Future  •  Vitamin D 25 hydroxy; Future  •  Folate; Future  •  Iron; Future  •  Ferritin; Future  •  PTH, intact; Future  •  Vitamin A; Future

## 2025-02-21 NOTE — PATIENT INSTRUCTIONS
Complete blood work today  Complete mammography for breast cancer screening  Follow-up with GYN for cervical cancer screening  Follow-up with gastroenterology you need a colonoscopy because of the family history of colon polyps but she would increased risk of colon cancer  Discontinue Prozac 40  Start Prozac 60 mg daily  Recheck 3 weeks sooner if needed

## 2025-02-21 NOTE — ASSESSMENT & PLAN NOTE
Depression Screening Follow-up Plan: Patient's depression screening was positive with a PHQ-9 score of 5. Patient advised to follow-up with PCP for further management.  Will increase Prozac from 40 to 60 mg daily      Orders:  •  FLUoxetine 60 MG TABS; Take 1 tablet (60 mg total) by mouth daily  •  CBC and differential; Future  •  Comprehensive metabolic panel; Future  •  TSH, 3rd generation with Free T4 reflex; Future  •  UA (URINE) with reflex to Scope; Future  •  Vitamin B12; Future  •  Vitamin D 25 hydroxy; Future  •  Folate; Future  •  Iron; Future  •  Ferritin; Future  •  PTH, intact; Future  •  Vitamin A; Future

## 2025-02-21 NOTE — PROGRESS NOTES
Name: Tash Alonzo      : 1980      MRN: [id number]  Encounter Provider: Eloy Alonzo DO  Encounter Date: 2025   Encounter department: [facility] PRIMARY CARE  :  Assessment & Plan  Screening for cervical cancer  Refer sleep blood work today  Follow with gastroenterology you need a colonoscopy for colon cancer screening because of your family history of colon polyps  Complete mammography for breast cancer screening  See GYN for cervical cancer screening to GYN  Orders:  •  Ambulatory referral to Obstetrics / Gynecology; Future    Encounter for screening mammogram for breast cancer    Orders:  •  Mammo screening bilateral w 3d and cad; Future    Family history of colonic polyps    Orders:  •  Ambulatory Referral to Gastroenterology; Future    Screening for colon cancer    Orders:  •  Ambulatory Referral to Gastroenterology; Future    Secondary hyperparathyroidism (HCC)  Follows with endocrinology blood work ordered  Orders:  •  CBC and differential; Future  •  Comprehensive metabolic panel; Future  •  TSH, 3rd generation with Free T4 reflex; Future  •  UA (URINE) with reflex to Scope; Future  •  Vitamin B12; Future  •  Vitamin D 25 hydroxy; Future  •  Folate; Future  •  Iron; Future  •  Ferritin; Future  •  PTH, intact; Future  •  Vitamin A; Future    Recurrent major depressive disorder, in full remission (HCC)  Depression Screening Follow-up Plan: Patient's depression screening was positive with a PHQ-9 score of 5. Patient advised to follow-up with PCP for further management.  Will increase Prozac from 40 to 60 mg daily      Orders:  •  FLUoxetine 60 MG TABS; Take 1 tablet (60 mg total) by mouth daily  •  CBC and differential; Future  •  Comprehensive metabolic panel; Future  •  TSH, 3rd generation with Free T4 reflex; Future  •  UA (URINE) with reflex to Scope; Future  •  Vitamin B12; Future  •  Vitamin D 25 hydroxy; Future  •  Folate; Future  •  Iron; Future  •  Ferritin; Future  •   PTH, intact; Future  •  Vitamin A; Future    Chronic fatigue  Blood work ordered  Orders:  •  CBC and differential; Future  •  Comprehensive metabolic panel; Future  •  TSH, 3rd generation with Free T4 reflex; Future  •  UA (URINE) with reflex to Scope; Future  •  Vitamin B12; Future  •  Vitamin D 25 hydroxy; Future  •  Folate; Future  •  Iron; Future  •  Ferritin; Future  •  PTH, intact; Future  •  Vitamin A; Future    Leukopenia, unspecified type  Blood work ordered  Orders:  •  CBC and differential; Future  •  Comprehensive metabolic panel; Future  •  TSH, 3rd generation with Free T4 reflex; Future  •  UA (URINE) with reflex to Scope; Future  •  Vitamin B12; Future  •  Vitamin D 25 hydroxy; Future  •  Folate; Future  •  Iron; Future  •  Ferritin; Future  •  PTH, intact; Future  •  Vitamin A; Future    Low vitamin B12 level  Blood work ordered  Orders:  •  CBC and differential; Future  •  Comprehensive metabolic panel; Future  •  TSH, 3rd generation with Free T4 reflex; Future  •  UA (URINE) with reflex to Scope; Future  •  Vitamin B12; Future  •  Vitamin D 25 hydroxy; Future  •  Folate; Future  •  Iron; Future  •  Ferritin; Future  •  PTH, intact; Future  •  Vitamin A; Future    Vitamin A deficiency  Blood work ordered  Orders:  •  CBC and differential; Future  •  Comprehensive metabolic panel; Future  •  TSH, 3rd generation with Free T4 reflex; Future  •  UA (URINE) with reflex to Scope; Future  •  Vitamin B12; Future  •  Vitamin D 25 hydroxy; Future  •  Folate; Future  •  Iron; Future  •  Ferritin; Future  •  PTH, intact; Future  •  Vitamin A; Future    Vitamin D deficiency  Blood work ordered  Orders:  •  CBC and differential; Future  •  Comprehensive metabolic panel; Future  •  TSH, 3rd generation with Free T4 reflex; Future  •  UA (URINE) with reflex to Scope; Future  •  Vitamin B12; Future  •  Vitamin D 25 hydroxy; Future  •  Folate; Future  •  Iron; Future  •  Ferritin; Future  •  PTH, intact; Future  •   "Vitamin A; Future    Postsurgical malabsorption  Blood work ordered  Orders:  •  CBC and differential; Future  •  Comprehensive metabolic panel; Future  •  TSH, 3rd generation with Free T4 reflex; Future  •  UA (URINE) with reflex to Scope; Future  •  Vitamin B12; Future  •  Vitamin D 25 hydroxy; Future  •  Folate; Future  •  Iron; Future  •  Ferritin; Future  •  PTH, intact; Future  •  Vitamin A; Future           History of Present Illness   \" More often.  Patient is taking her Prozac.  Does feel \"down\" occasionally.  Increasing stress at work.      Review of Systems   Constitutional:  Positive for fatigue.   HENT: Negative.     Eyes: Negative.    Respiratory: Negative.     Cardiovascular: Negative.    Gastrointestinal: Negative.    Endocrine: Negative.    Genitourinary: Negative.    Musculoskeletal: Negative.    Skin: Negative.    Allergic/Immunologic: Negative.    Neurological: Negative.    Hematological: Negative.    Psychiatric/Behavioral:          HPI       Objective   /80 (BP Location: Right arm, Patient Position: Sitting, Cuff Size: Standard)   Pulse 66   Ht 5' (1.524 m)   Wt 63.4 kg (139 lb 12.8 oz)   SpO2 99%   BMI 27.30 kg/m²      Physical Exam  Vitals and nursing note reviewed.   Constitutional:       Appearance: Normal appearance.   HENT:      Head: Normocephalic and atraumatic.      Mouth/Throat:      Mouth: Mucous membranes are moist.   Eyes:      General: No scleral icterus.  Neck:      Vascular: No carotid bruit.   Cardiovascular:      Rate and Rhythm: Normal rate and regular rhythm.      Heart sounds: Normal heart sounds.   Pulmonary:      Effort: Pulmonary effort is normal.      Breath sounds: Normal breath sounds.   Abdominal:      General: Bowel sounds are normal.      Palpations: Abdomen is soft.      Tenderness: There is no abdominal tenderness.   Musculoskeletal:      Cervical back: Neck supple.   Skin:     General: Skin is warm and dry.   Neurological:      General: No focal " deficit present.      Mental Status: She is alert.   Psychiatric:      Comments: Positive depression screen

## 2025-02-21 NOTE — ASSESSMENT & PLAN NOTE
Follows with endocrinology blood work ordered  Orders:  •  CBC and differential; Future  •  Comprehensive metabolic panel; Future  •  TSH, 3rd generation with Free T4 reflex; Future  •  UA (URINE) with reflex to Scope; Future  •  Vitamin B12; Future  •  Vitamin D 25 hydroxy; Future  •  Folate; Future  •  Iron; Future  •  Ferritin; Future  •  PTH, intact; Future  •  Vitamin A; Future

## 2025-02-24 LAB — VIT A SERPL-MCNC: 23.8 UG/DL (ref 20.1–62)

## 2025-02-25 NOTE — TELEPHONE ENCOUNTER
Pt returning callback. Relayed results to as per Dr. Alonzo's message. Patient expressed understanding and did not have any further questions.

## 2025-03-17 ENCOUNTER — TELEPHONE (OUTPATIENT)
Dept: FAMILY MEDICINE CLINIC | Facility: CLINIC | Age: 45
End: 2025-03-17

## 2025-03-18 ENCOUNTER — OFFICE VISIT (OUTPATIENT)
Dept: FAMILY MEDICINE CLINIC | Facility: CLINIC | Age: 45
End: 2025-03-18
Payer: COMMERCIAL

## 2025-03-18 VITALS
OXYGEN SATURATION: 99 % | WEIGHT: 143 LBS | HEIGHT: 60 IN | SYSTOLIC BLOOD PRESSURE: 130 MMHG | BODY MASS INDEX: 28.07 KG/M2 | HEART RATE: 82 BPM | DIASTOLIC BLOOD PRESSURE: 80 MMHG

## 2025-03-18 DIAGNOSIS — K91.2 POSTSURGICAL MALABSORPTION: Primary | ICD-10-CM

## 2025-03-18 DIAGNOSIS — E55.9 VITAMIN D DEFICIENCY: ICD-10-CM

## 2025-03-18 DIAGNOSIS — F33.42 RECURRENT MAJOR DEPRESSIVE DISORDER, IN FULL REMISSION (HCC): ICD-10-CM

## 2025-03-18 DIAGNOSIS — E50.9 VITAMIN A DEFICIENCY: ICD-10-CM

## 2025-03-18 DIAGNOSIS — D72.819 LEUKOPENIA, UNSPECIFIED TYPE: ICD-10-CM

## 2025-03-18 DIAGNOSIS — E78.5 DYSLIPIDEMIA: ICD-10-CM

## 2025-03-18 DIAGNOSIS — N25.81 SECONDARY HYPERPARATHYROIDISM (HCC): ICD-10-CM

## 2025-03-18 PROCEDURE — 99214 OFFICE O/P EST MOD 30 MIN: CPT | Performed by: FAMILY MEDICINE

## 2025-03-18 RX ORDER — FLUOXETINE HYDROCHLORIDE 60 MG/1
60 TABLET, FILM COATED ORAL; ORAL DAILY
Qty: 90 TABLET | Refills: 3 | Status: SHIPPED | OUTPATIENT
Start: 2025-03-18

## 2025-03-18 NOTE — ASSESSMENT & PLAN NOTE
In remission on Prozac 60 mg daily will continue refill was given  Orders:  •  FLUoxetine 60 MG TABS; Take 1 tablet (60 mg total) by mouth daily

## 2025-03-18 NOTE — PATIENT INSTRUCTIONS
Prozac at 60 mg daily  Return at scheduled appointment sooner if needed  Follow with endocrinology as planned

## 2025-03-18 NOTE — PROGRESS NOTES
Name: Tash Alonzo      : 1980      MRN: [id number]  Encounter Provider: Eloy Alonzo DO  Encounter Date: 3/18/2025   Encounter department: [facility] PRIMARY CARE  :  Assessment & Plan  Postsurgical malabsorption  Blood work discussed       Secondary hyperparathyroidism (HCC)  PTH is elevated patient to follow with endocrinology as planned       Recurrent major depressive disorder, in full remission (HCC)  In remission on Prozac 60 mg daily will continue refill was given  Orders:  •  FLUoxetine 60 MG TABS; Take 1 tablet (60 mg total) by mouth daily    Leukopenia, unspecified type  Chronic/benign per hematology continue to monitor       Dyslipidemia  Check in yearly basis this is normal after her gastric bypass         Vitamin A deficiency  Level normal check yearly       Vitamin D deficiency  Level normal check yearly UA              History of Present Illness   Patient doing well feels much better on Prozac 60 mg daily.  Blood work was reviewed      Review of Systems   Constitutional: Negative.    HENT: Negative.     Eyes: Negative.    Respiratory: Negative.     Cardiovascular: Negative.    Gastrointestinal: Negative.    Endocrine: Negative.    Genitourinary: Negative.    Musculoskeletal: Negative.    Skin: Negative.    Allergic/Immunologic: Negative.    Neurological: Negative.    Hematological: Negative.    Psychiatric/Behavioral:          HPI       Objective   /80 (BP Location: Right arm, Patient Position: Sitting, Cuff Size: Standard)   Pulse 82   Ht 5' (1.524 m)   Wt 64.9 kg (143 lb)   SpO2 99%   BMI 27.93 kg/m²      Physical Exam  Vitals and nursing note reviewed.   Constitutional:       Appearance: Normal appearance.   HENT:      Head: Normocephalic and atraumatic.      Mouth/Throat:      Mouth: Mucous membranes are moist.   Eyes:      General: No scleral icterus.  Neck:      Vascular: No carotid bruit.   Cardiovascular:      Rate and Rhythm: Normal rate and regular  rhythm.      Heart sounds: Normal heart sounds.   Pulmonary:      Effort: Pulmonary effort is normal.      Breath sounds: Normal breath sounds.   Musculoskeletal:      Cervical back: Neck supple.   Skin:     General: Skin is warm and dry.   Neurological:      General: No focal deficit present.      Mental Status: She is alert.   Psychiatric:         Mood and Affect: Mood normal.

## 2025-06-03 ENCOUNTER — VBI (OUTPATIENT)
Dept: ADMINISTRATIVE | Facility: OTHER | Age: 45
End: 2025-06-03

## 2025-06-03 NOTE — TELEPHONE ENCOUNTER
06/03/25 1:32 PM     Chart reviewed for CRC: Colonoscopy ; nothing is submitted to the patient's insurance at this time.     Meri Green MA   [facility]

## 2025-06-18 ENCOUNTER — ANNUAL EXAM (OUTPATIENT)
Dept: OBGYN CLINIC | Facility: MEDICAL CENTER | Age: 45
End: 2025-06-18
Payer: COMMERCIAL

## 2025-06-18 ENCOUNTER — APPOINTMENT (OUTPATIENT)
Dept: LAB | Facility: CLINIC | Age: 45
End: 2025-06-18
Payer: COMMERCIAL

## 2025-06-18 VITALS
WEIGHT: 141.5 LBS | SYSTOLIC BLOOD PRESSURE: 128 MMHG | DIASTOLIC BLOOD PRESSURE: 70 MMHG | BODY MASS INDEX: 27.78 KG/M2 | HEIGHT: 60 IN

## 2025-06-18 DIAGNOSIS — Z01.419 ENCOUNTER FOR WELL WOMAN EXAM WITH ROUTINE GYNECOLOGICAL EXAM: Primary | ICD-10-CM

## 2025-06-18 DIAGNOSIS — Z12.11 SCREEN FOR COLON CANCER: ICD-10-CM

## 2025-06-18 DIAGNOSIS — E50.9 VITAMIN A DEFICIENCY: ICD-10-CM

## 2025-06-18 DIAGNOSIS — F33.42 RECURRENT MAJOR DEPRESSIVE DISORDER, IN FULL REMISSION (HCC): ICD-10-CM

## 2025-06-18 DIAGNOSIS — Z12.4 SCREENING FOR CERVICAL CANCER: ICD-10-CM

## 2025-06-18 DIAGNOSIS — K91.2 POSTSURGICAL MALABSORPTION: ICD-10-CM

## 2025-06-18 DIAGNOSIS — Z12.11 SPECIAL SCREENING FOR MALIGNANT NEOPLASMS, COLON: ICD-10-CM

## 2025-06-18 DIAGNOSIS — D70.9 NEUTROPENIA, UNSPECIFIED TYPE (HCC): ICD-10-CM

## 2025-06-18 DIAGNOSIS — E55.9 VITAMIN D DEFICIENCY: ICD-10-CM

## 2025-06-18 DIAGNOSIS — R79.89 LOW VITAMIN B12 LEVEL: ICD-10-CM

## 2025-06-18 DIAGNOSIS — E78.5 DYSLIPIDEMIA: ICD-10-CM

## 2025-06-18 DIAGNOSIS — Z83.719 FAMILY HISTORY OF COLONIC POLYPS: ICD-10-CM

## 2025-06-18 DIAGNOSIS — N25.81 SECONDARY HYPERPARATHYROIDISM (HCC): ICD-10-CM

## 2025-06-18 DIAGNOSIS — Z12.31 ENCOUNTER FOR SCREENING MAMMOGRAM FOR BREAST CANCER: ICD-10-CM

## 2025-06-18 DIAGNOSIS — D72.819 LEUKOPENIA, UNSPECIFIED TYPE: ICD-10-CM

## 2025-06-18 PROBLEM — M62.830 MUSCLE SPASM OF BACK: Status: RESOLVED | Noted: 2018-10-09 | Resolved: 2025-06-18

## 2025-06-18 LAB
25(OH)D3 SERPL-MCNC: 39.7 NG/ML (ref 30–100)
ALBUMIN SERPL BCG-MCNC: 4.6 G/DL (ref 3.5–5)
ALP SERPL-CCNC: 88 U/L (ref 34–104)
ALT SERPL W P-5'-P-CCNC: 25 U/L (ref 7–52)
ANION GAP SERPL CALCULATED.3IONS-SCNC: 10 MMOL/L (ref 4–13)
AST SERPL W P-5'-P-CCNC: 26 U/L (ref 13–39)
BASOPHILS # BLD AUTO: 0.05 THOUSANDS/ÂΜL (ref 0–0.1)
BASOPHILS NFR BLD AUTO: 1 % (ref 0–1)
BILIRUB SERPL-MCNC: 0.5 MG/DL (ref 0.2–1)
BILIRUB UR QL STRIP: NEGATIVE
BUN SERPL-MCNC: 7 MG/DL (ref 5–25)
CALCIUM SERPL-MCNC: 9.1 MG/DL (ref 8.4–10.2)
CHLORIDE SERPL-SCNC: 102 MMOL/L (ref 96–108)
CHOLEST SERPL-MCNC: 152 MG/DL (ref ?–200)
CLARITY UR: CLEAR
CO2 SERPL-SCNC: 27 MMOL/L (ref 21–32)
COLOR UR: COLORLESS
CREAT SERPL-MCNC: 0.5 MG/DL (ref 0.6–1.3)
EOSINOPHIL # BLD AUTO: 0.04 THOUSAND/ÂΜL (ref 0–0.61)
EOSINOPHIL NFR BLD AUTO: 1 % (ref 0–6)
ERYTHROCYTE [DISTWIDTH] IN BLOOD BY AUTOMATED COUNT: 11.9 % (ref 11.6–15.1)
FOLATE SERPL-MCNC: >22.3 NG/ML
GFR SERPL CREATININE-BSD FRML MDRD: 117 ML/MIN/1.73SQ M
GLUCOSE P FAST SERPL-MCNC: 92 MG/DL (ref 65–99)
GLUCOSE UR STRIP-MCNC: NEGATIVE MG/DL
HCT VFR BLD AUTO: 40.4 % (ref 34.8–46.1)
HDLC SERPL-MCNC: 80 MG/DL
HGB BLD-MCNC: 13 G/DL (ref 11.5–15.4)
HGB UR QL STRIP.AUTO: NEGATIVE
IMM GRANULOCYTES # BLD AUTO: 0.02 THOUSAND/UL (ref 0–0.2)
IMM GRANULOCYTES NFR BLD AUTO: 1 % (ref 0–2)
KETONES UR STRIP-MCNC: NEGATIVE MG/DL
LDLC SERPL CALC-MCNC: 59 MG/DL (ref 0–100)
LEUKOCYTE ESTERASE UR QL STRIP: NEGATIVE
LYMPHOCYTES # BLD AUTO: 1.45 THOUSANDS/ÂΜL (ref 0.6–4.47)
LYMPHOCYTES NFR BLD AUTO: 33 % (ref 14–44)
MCH RBC QN AUTO: 29.7 PG (ref 26.8–34.3)
MCHC RBC AUTO-ENTMCNC: 32.2 G/DL (ref 31.4–37.4)
MCV RBC AUTO: 92 FL (ref 82–98)
MONOCYTES # BLD AUTO: 0.37 THOUSAND/ÂΜL (ref 0.17–1.22)
MONOCYTES NFR BLD AUTO: 8 % (ref 4–12)
NEUTROPHILS # BLD AUTO: 2.48 THOUSANDS/ÂΜL (ref 1.85–7.62)
NEUTS SEG NFR BLD AUTO: 56 % (ref 43–75)
NITRITE UR QL STRIP: NEGATIVE
NRBC BLD AUTO-RTO: 0 /100 WBCS
PH UR STRIP.AUTO: 7.5 [PH]
PLATELET # BLD AUTO: 328 THOUSANDS/UL (ref 149–390)
PMV BLD AUTO: 10.9 FL (ref 8.9–12.7)
POTASSIUM SERPL-SCNC: 3.8 MMOL/L (ref 3.5–5.3)
PROT SERPL-MCNC: 6.8 G/DL (ref 6.4–8.4)
PROT UR STRIP-MCNC: NEGATIVE MG/DL
RBC # BLD AUTO: 4.38 MILLION/UL (ref 3.81–5.12)
SODIUM SERPL-SCNC: 139 MMOL/L (ref 135–147)
SP GR UR STRIP.AUTO: 1 (ref 1–1.03)
TRIGL SERPL-MCNC: 63 MG/DL (ref ?–150)
TSH SERPL DL<=0.05 MIU/L-ACNC: 2.13 UIU/ML (ref 0.45–4.5)
UROBILINOGEN UR STRIP-ACNC: <2 MG/DL
VIT B12 SERPL-MCNC: 178 PG/ML (ref 180–914)
WBC # BLD AUTO: 4.41 THOUSAND/UL (ref 4.31–10.16)

## 2025-06-18 PROCEDURE — 84443 ASSAY THYROID STIM HORMONE: CPT

## 2025-06-18 PROCEDURE — S0610 ANNUAL GYNECOLOGICAL EXAMINA: HCPCS | Performed by: OBSTETRICS & GYNECOLOGY

## 2025-06-18 PROCEDURE — 82746 ASSAY OF FOLIC ACID SERUM: CPT

## 2025-06-18 PROCEDURE — 81003 URINALYSIS AUTO W/O SCOPE: CPT

## 2025-06-18 PROCEDURE — G0476 HPV COMBO ASSAY CA SCREEN: HCPCS | Performed by: OBSTETRICS & GYNECOLOGY

## 2025-06-18 PROCEDURE — 82607 VITAMIN B-12: CPT

## 2025-06-18 PROCEDURE — 80061 LIPID PANEL: CPT

## 2025-06-18 PROCEDURE — 82306 VITAMIN D 25 HYDROXY: CPT

## 2025-06-18 PROCEDURE — G0145 SCR C/V CYTO,THINLAYER,RESCR: HCPCS | Performed by: OBSTETRICS & GYNECOLOGY

## 2025-06-18 PROCEDURE — 80053 COMPREHEN METABOLIC PANEL: CPT

## 2025-06-18 PROCEDURE — 36415 COLL VENOUS BLD VENIPUNCTURE: CPT

## 2025-06-18 PROCEDURE — 85025 COMPLETE CBC W/AUTO DIFF WBC: CPT

## 2025-06-18 PROCEDURE — 84590 ASSAY OF VITAMIN A: CPT

## 2025-06-18 NOTE — PROGRESS NOTES
ASSESSMENT & PLAN: Tash was seen today for gynecologic exam.    Diagnoses and all orders for this visit:    Encounter for well woman exam with routine gynecological exam  -     Liquid-based pap, screening    Special screening for malignant neoplasms, colon  -     Ambulatory Referral to Gastroenterology; Future    Screening for cervical cancer  -     Ambulatory referral to Obstetrics / Gynecology        1.  Routine well woman exam done today  2.  Pap and HPV:  The patient's pap is not up to date.  Pap and cotesting was done today.  Current ASCCP Guidelines reviewed.   3.  Mammogram has been scheduled.  4. The following were reviewed in today's visit: adequate intake of calcium and vitamin D, exercise, and healthy diet.  5. F/u 1 year for next routine GYN exam.  6.  Menopausal symptoms: Patient given handout on over-the-counter/alternative remedies.  7.  Referral placed for GI for colonoscopy.     CC:  Annual Gynecologic Examination    HPI: Tash Alonzo is a 44 y.o.  who presents for annual gynecologic examination.  She has the following concerns:  pt reports terrible hot flashes starting in January.  Starting to become less, was daily.  Menses are normal q28 days.  Admits to having gained weight due to not following her diet closely.  Was having a lot of workplace stresses.  Patient has transferred to a different school for this upcoming fall.    Health Maintenance:    Patient describes her health as good.  Patient has weight concerns.  She has been exercising as much as she should  Was doing the elliptical and walking.   She does wear her seatbelt routinely.    She does perform regular monthly self breast exams.    She does feel safe at home.   Patients does follow a  low sugar, high protein diet.  She gets 2 servings of dairy or calcium rich foods a day.     Her pap: 2018    Last mammogram: , has order  Last colonoscopy: n/a    Problem List[1]    Past Medical History[2]    Past Surgical  History[3]    Past OB/Gyn History:  Pt does not have menstrual issues.  History of sexually transmitted infection: No.  History of abnormal pap smears: No.  Patient is currently sexually active.  heterosexual. The current method of family planning is tubal ligation.    Family History[4]    Social History:  Social History     Socioeconomic History    Marital status: /Civil Union     Spouse name: Not on file    Number of children: Not on file    Years of education: Not on file    Highest education level: Not on file   Occupational History    Not on file   Tobacco Use    Smoking status: Never     Passive exposure: Past    Smokeless tobacco: Never   Vaping Use    Vaping status: Never Used   Substance and Sexual Activity    Alcohol use: Not Currently     Comment: social    Drug use: No    Sexual activity: Yes     Partners: Male     Birth control/protection: Female Sterilization   Other Topics Concern    Not on file   Social History Narrative    No secondhand smoke exposure     Social Drivers of Health     Financial Resource Strain: Not on file   Food Insecurity: Not on file   Transportation Needs: Not on file   Physical Activity: Not on file   Stress: Not on file   Social Connections: Not on file   Intimate Partner Violence: Not on file   Housing Stability: Not on file     Presently lives with  and her 20 y.o. son.  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    Allergies[5]    Current Medications[6]      Review of Systems  Constitutional :no fever, feels well, no tiredness, recent weight gain.  ENT: no ear ache, no loss of hearing, no nosebleeds or nasal discharge, no sore throat or hoarseness.  Cardiovascular: no complaints of slow or fast heart beat, no chest pain, no palpitations, no leg claudication or lower extremity edema.  Respiratory: no complaints of shortness of shortness of breath, no COFFEY  Breasts:no complaints of breast pain, breast lump, or nipple discharge  Gastrointestinal: no  complaints of abdominal pain, constipation, nausea, vomiting, or diarrhea or bloody stools  Genitourinary : no complaints of dysuria, incontinence, pelvic pain, no dysmenorrhea, vaginal discharge or abnormal vaginal bleeding and as noted in HPI.  Musculoskeletal: no complaints of arthralgia, no myalgia, no joint swelling or stiffness, no limb pain or swelling.  Integumentary: no complaints of skin rash or lesion, itching or dry skin  Neurological: no complaints of headache, no confusion, no numbness or tingling, no dizziness or fainting    Physical Exam:     /70   Ht 5' (1.524 m)   Wt 64.2 kg (141 lb 8 oz)   LMP 06/06/2025   BMI 27.63 kg/m²     General: appears stated age, cooperative, alert normal mood and affect   Psychiatric oriented to person, place and time.  Mood and affect normal   Neck: normal, supple,trachea midline, no masses.  Thyroid: normal, no thyromegaly   Heart: regular rate and rhythm, S1, S2 normal, no murmur, click, rub or gallop   Lungs: clear to auscultation bilaterally, no increased work of breathing or signs of respiratory distress   Breasts: normal, no dimpling or skin changes noted   Abdomen: soft, non-tender, without masses or organomegaly   Vulva: normal , no lesions   Vagina: normal , no lesions or dryness   Urethra: normal   Urethal meatus normal   Bladder Normal, soft, non-tender and no prolapse or masses appreciated   Cervix: normal, no palpable masses    Uterus: normal , non-tender, not enlarged, no palpable masses   Adnexa: normal, non-tender without fullness or masses   Lymphatic Palpation of lymph nodes in neck, axilla, groin and/or other locations: no lymphadenopathy or masses noted   Skin Normal skin turgor and no rashes.  Palpation of skin and subcutaneous tissue normal.             [1]   Patient Active Problem List  Diagnosis    Allergic rhinitis    Herpes simplex infection    Low vitamin B12 level    Postsurgical malabsorption    Secondary hyperparathyroidism (HCC)  "   Bariatric surgery status    Leukopenia    Vitamin A deficiency    S/P right oophorectomy    Vitamin D deficiency    Dyslipidemia    Recurrent major depressive disorder, in full remission (HCC)    Family history of colonic polyps    Chronic fatigue   [2]   Past Medical History:  Diagnosis Date    Allergic rhinitis 2013    Anemia     Anxiety     Depression     Fatty liver     resolved with weight loss     Herpes     Last outbreak was within the past year but she doesn't remember when    Hypertension     Resolved after bariatric surgery    Mild heartburn     resolved    Mitral regurgitation     Murmur, cardiac     \"slight\" since childhood    Ovarian cyst     Pneumonia 2003    x1- double pneumonia and was hospitalized    Transaminitis     liver enzymes elevated prior to gastric bypass    Tricuspid regurgitation    [3]   Past Surgical History:  Procedure Laterality Date     SECTION      x1    ENDOMETRIAL ABLATION      ESOPHAGOGASTRODUODENOSCOPY N/A 2017    Procedure: ESOPHAGOGASTRODUODENOSCOPY (EGD);  Surgeon: Yenni Franklin MD;  Location: [facility];  Service:     PELVIC LAPAROSCOPY Right 3/4/2019    Procedure: CYSTECTOMY OVARIAN, LAPAROSCOPIC;  Surgeon: Niki Benitez MD;  Location: [facility];  Service: Gynecology    HI EGD TRANSORAL BIOPSY SINGLE/MULTIPLE N/A 3/1/2017    Procedure: ESOPHAGOGASTRODUODENOSCOPY (EGD) with gastric bx;  Surgeon: Yenni Franklin MD;  Location: [facility];  Service: Bariatrics    HI LAPS GSTR RSTCV PX W/BYP EMILIA-EN-Y LIMB <150 CM N/A 2017    Procedure: BYPASS GASTRIC  EMLIIA-EN-Y LAPAROSCOPIC;  Surgeon: Yenni Franklin MD;  Location: [facility];  Service: Bariatrics    TUBAL LIGATION     [4]   Family History  Problem Relation Name Age of Onset    Anxiety disorder Mother Sunitha     Depression Mother Sunitha     Leukemia Mother Sunitha     Other Mother Sunitha         hysterectomy     Colonic polyp Mother Sunitha     Hypertension Father Ray     Diabetes Father Ray     Allergies Son " "     Coronary artery disease Maternal Grandmother      Heart failure Maternal Grandmother      Diabetes type II Maternal Grandfather Ike     Stroke Maternal Grandfather Philo     Diabetes Maternal Grandfather Ike     Colonic polyp Maternal Grandfather Ike     Diabetes Paternal Grandmother      Kidney failure Paternal Grandmother          on dialysis     Diabetes type II Paternal Grandfather      Diabetes Paternal Grandfather      No Known Problems Maternal Aunt      Breast cancer Neg Hx     [5]   Allergies  Allergen Reactions    Nsaids Other (See Comments)     Has had bariatric surgery and can't have.    Wellbutrin [Bupropion] Other (See Comments) and Hyperactivity     \"felt like she could climb the wall\"   [6]   Current Outpatient Medications:     Ascorbic Acid (vitamin C) 1000 MG tablet, Take 1,000 mg by mouth in the morning., Disp: , Rfl:     cholecalciferol (VITAMIN D3) 1,000 units tablet, Take 6,000 Units by mouth in the morning., Disp: , Rfl:     fexofenadine (ALLEGRA) 180 MG tablet, Take 180 mg by mouth in the morning., Disp: , Rfl:     FLUoxetine 60 MG TABS, Take 1 tablet (60 mg total) by mouth daily, Disp: 90 tablet, Rfl: 3    fluticasone (FLONASE) 50 mcg/act nasal spray, 2 sprays into each nostril as needed, Disp: , Rfl:     IRON PO, Take by mouth in the morning., Disp: , Rfl:     Multiple Vitamins-Minerals (Bariatric Fusion) CHEW, Takes 2--- which has 300mg calcium per tab (600mg calcium total), Disp: 60 tablet, Rfl: 1    valACYclovir (VALTREX) 500 mg tablet, TAKE 1 TABLET BY MOUTH EVERY DAY, Disp: 90 tablet, Rfl: 0    vitamin E, tocopherol, 400 units capsule, Take 400 Units by mouth in the morning., Disp: , Rfl:     "

## 2025-06-20 ENCOUNTER — OFFICE VISIT (OUTPATIENT)
Dept: FAMILY MEDICINE CLINIC | Facility: CLINIC | Age: 45
End: 2025-06-20
Payer: COMMERCIAL

## 2025-06-20 VITALS
HEART RATE: 71 BPM | WEIGHT: 139 LBS | DIASTOLIC BLOOD PRESSURE: 74 MMHG | OXYGEN SATURATION: 100 % | SYSTOLIC BLOOD PRESSURE: 140 MMHG | HEIGHT: 60 IN | BODY MASS INDEX: 27.29 KG/M2

## 2025-06-20 DIAGNOSIS — Z98.84 BARIATRIC SURGERY STATUS: ICD-10-CM

## 2025-06-20 DIAGNOSIS — N25.81 SECONDARY HYPERPARATHYROIDISM (HCC): ICD-10-CM

## 2025-06-20 DIAGNOSIS — E78.5 DYSLIPIDEMIA: ICD-10-CM

## 2025-06-20 DIAGNOSIS — F33.42 RECURRENT MAJOR DEPRESSIVE DISORDER, IN FULL REMISSION (HCC): ICD-10-CM

## 2025-06-20 DIAGNOSIS — D72.819 LEUKOPENIA, UNSPECIFIED TYPE: ICD-10-CM

## 2025-06-20 DIAGNOSIS — Z83.719 FAMILY HISTORY OF COLONIC POLYPS: ICD-10-CM

## 2025-06-20 DIAGNOSIS — K91.2 POSTSURGICAL MALABSORPTION: ICD-10-CM

## 2025-06-20 DIAGNOSIS — R79.89 LOW VITAMIN B12 LEVEL: ICD-10-CM

## 2025-06-20 DIAGNOSIS — E55.9 VITAMIN D DEFICIENCY: ICD-10-CM

## 2025-06-20 DIAGNOSIS — E50.9 VITAMIN A DEFICIENCY: ICD-10-CM

## 2025-06-20 DIAGNOSIS — Z00.00 ANNUAL PHYSICAL EXAM: Primary | ICD-10-CM

## 2025-06-20 DIAGNOSIS — Z12.11 SCREENING FOR COLON CANCER: ICD-10-CM

## 2025-06-20 PROCEDURE — 99396 PREV VISIT EST AGE 40-64: CPT | Performed by: FAMILY MEDICINE

## 2025-06-20 RX ORDER — MAGNESIUM 200 MG
1 TABLET ORAL DAILY
COMMUNITY

## 2025-06-20 NOTE — ASSESSMENT & PLAN NOTE
In remission on fluoxetine 60 mg daily  Orders:  •  CBC; Future  •  Comprehensive metabolic panel; Future  •  Lipid Panel with Direct LDL reflex; Future  •  Vitamin B12; Future  •  Vitamin D 25 hydroxy; Future  •  Folate; Future  •  TSH, 3rd generation with Free T4 reflex; Future  •  PTH, intact; Future  •  Vitamin A; Future

## 2025-06-20 NOTE — ASSESSMENT & PLAN NOTE
Referred to GI  Orders:  •  Ambulatory Referral to Gastroenterology; Future  •  CBC; Future  •  Comprehensive metabolic panel; Future  •  Lipid Panel with Direct LDL reflex; Future  •  Vitamin B12; Future  •  Vitamin D 25 hydroxy; Future  •  Folate; Future  •  TSH, 3rd generation with Free T4 reflex; Future  •  PTH, intact; Future  •  Vitamin A; Future

## 2025-06-20 NOTE — ASSESSMENT & PLAN NOTE
Benign per hematology normal today we will continue to monitor  Orders:  •  CBC; Future  •  Comprehensive metabolic panel; Future  •  Lipid Panel with Direct LDL reflex; Future  •  Vitamin B12; Future  •  Vitamin D 25 hydroxy; Future  •  Folate; Future  •  TSH, 3rd generation with Free T4 reflex; Future  •  PTH, intact; Future  •  Vitamin A; Future

## 2025-06-20 NOTE — PATIENT INSTRUCTIONS
"I recommend starting vitamin B12 1000 mcg tablets.  Please take 1 tablet sublingual daily.  Make sure these are sublingual tablets (under the tongue)  Follow with all specialist per the instructions  Return in 6 months for office visit and blood work sooner if needed  Complete mammography as planned  Follow with gastroenterology due for colonoscopy        Patient Education     Routine physical for adults   The Basics   Written by the doctors and editors at [facility]   What is a physical? -- A physical is a routine visit, or \"check-up,\" with your doctor. You might also hear it called a \"wellness visit\" or \"preventive visit.\"  During each visit, the doctor will:   Ask about your physical and mental health   Ask about your habits, behaviors, and lifestyle   Do an exam   Give you vaccines if needed   Talk to you about any medicines you take   Give advice about your health   Answer your questions  Getting regular check-ups is an important part of taking care of your health. It can help your doctor find and treat any problems you have. But it's also important for preventing health problems.  A routine physical is different from a \"sick visit.\" A sick visit is when you see a doctor because of a health concern or problem. Since physicals are scheduled ahead of time, you can think about what you want to ask the doctor.  How often should I get a physical? -- It depends on your age and health. In general, for people age 21 years and older:   If you are younger than 50 years, you might be able to get a physical every 3 years.   If you are 50 years or older, your doctor might recommend a physical every year.  If you have an ongoing health condition, like diabetes or high blood pressure, your doctor will probably want to see you more often.  What happens during a physical? -- In general, each visit will include:   Physical exam - The doctor or nurse will check your height, weight, heart rate, and blood pressure. They will also " "look at your eyes and ears. They will ask about how you are feeling and whether you have any symptoms that bother you.   Medicines - It's a good idea to bring a list of all the medicines you take to each doctor visit. Your doctor will talk to you about your medicines and answer any questions. Tell them if you are having any side effects that bother you. You should also tell them if you are having trouble paying for any of your medicines.   Habits and behaviors - This includes:   Your diet   Your exercise habits   Whether you smoke, drink alcohol, or use drugs   Whether you are sexually active   Whether you feel safe at home  Your doctor will talk to you about things you can do to improve your health and lower your risk of health problems. They will also offer help and support. For example, if you want to quit smoking, they can give you advice and might prescribe medicines. If you want to improve your diet or get more physical activity, they can help you with this, too.   Lab tests, if needed - The tests you get will depend on your age and situation. For example, your doctor might want to check your:   Cholesterol   Blood sugar   Iron level   Vaccines - The recommended vaccines will depend on your age, health, and what vaccines you already had. Vaccines are very important because they can prevent certain serious or deadly infections.   Discussion of screening - \"Screening\" means checking for diseases or other health problems before they cause symptoms. Your doctor can recommend screening based on your age, risk, and preferences. This might include tests to check for:   Cancer, such as breast, prostate, cervical, ovarian, colorectal, prostate, lung, or skin cancer   Sexually transmitted infections, such as chlamydia and gonorrhea   Mental health conditions like depression and anxiety  Your doctor will talk to you about the different types of screening tests. They can help you decide which screenings to have. They can " also explain what the results might mean.   Answering questions - The physical is a good time to ask the doctor or nurse questions about your health. If needed, they can refer you to other doctors or specialists, too.  Adults older than 65 years often need other care, too. As you get older, your doctor will talk to you about:   How to prevent falling at home   Hearing or vision tests   Memory testing   How to take your medicines safely   Making sure that you have the help and support you need at home  All topics are updated as new evidence becomes available and our peer review process is complete.  This topic retrieved from [employer] on: May 02, 2024.  Topic 402073 Version 1.0  Release: 32.4.3 - C32.122  © 2024 UpToDate, Inc. and/or its affiliates. All rights reserved.  Consumer Information Use and Disclaimer   Disclaimer: This generalized information is a limited summary of diagnosis, treatment, and/or medication information. It is not meant to be comprehensive and should be used as a tool to help the user understand and/or assess potential diagnostic and treatment options. It does NOT include all information about conditions, treatments, medications, side effects, or risks that may apply to a specific patient. It is not intended to be medical advice or a substitute for the medical advice, diagnosis, or treatment of a health care provider based on the health care provider's examination and assessment of a patient's specific and unique circumstances. Patients must speak with a health care provider for complete information about their health, medical questions, and treatment options, including any risks or benefits regarding use of medications. This information does not endorse any treatments or medications as safe, effective, or approved for treating a specific patient. UpToDate, Inc. and its affiliates disclaim any warranty or liability relating to this information or the use thereof.The use of this information is  governed by the Terms of Use, available at https://www.woltersArchipelagouwer.com/en/know/clinical-effectiveness-terms. 2024© [employer] and its affiliates and/or licensors. All rights reserved.  Copyright   © 2024 [employer] and/or its affiliates. All rights reserved.

## 2025-06-20 NOTE — ASSESSMENT & PLAN NOTE
Patient follow with endocrinology, blood work is ordered  Orders:  •  CBC; Future  •  Comprehensive metabolic panel; Future  •  Lipid Panel with Direct LDL reflex; Future  •  Vitamin B12; Future  •  Vitamin D 25 hydroxy; Future  •  Folate; Future  •  TSH, 3rd generation with Free T4 reflex; Future  •  PTH, intact; Future  •  Vitamin A; Future

## 2025-06-20 NOTE — ASSESSMENT & PLAN NOTE
Low, I recommend B12 1000 mcg 1 sublingual daily  Orders:  •  CBC; Future  •  Comprehensive metabolic panel; Future  •  Lipid Panel with Direct LDL reflex; Future  •  Vitamin B12; Future  •  Vitamin D 25 hydroxy; Future  •  Folate; Future  •  TSH, 3rd generation with Free T4 reflex; Future  •  PTH, intact; Future  •  Vitamin A; Future

## 2025-06-20 NOTE — ASSESSMENT & PLAN NOTE
Normal continue to monitor  Orders:  •  CBC; Future  •  Comprehensive metabolic panel; Future  •  Lipid Panel with Direct LDL reflex; Future  •  Vitamin B12; Future  •  Vitamin D 25 hydroxy; Future  •  Folate; Future  •  TSH, 3rd generation with Free T4 reflex; Future  •  PTH, intact; Future  •  Vitamin A; Future

## 2025-06-20 NOTE — PROGRESS NOTES
Adult Annual Physical  Name: Tash Alonzo      : 1980      MRN: [id number]  Encounter Provider: Eloy Alonzo DO  Encounter Date: 2025   Encounter department: [facility]  Return in 6 months for office visit blood work sooner if needed    :  Assessment & Plan  Annual physical exam  Patient has mammography scheduled  Referred to GI for colonoscopy       Family history of colonic polyps  Referred to GI  Orders:  •  Ambulatory Referral to Gastroenterology; Future  •  CBC; Future  •  Comprehensive metabolic panel; Future  •  Lipid Panel with Direct LDL reflex; Future  •  Vitamin B12; Future  •  Vitamin D 25 hydroxy; Future  •  Folate; Future  •  TSH, 3rd generation with Free T4 reflex; Future  •  PTH, intact; Future  •  Vitamin A; Future    Screening for colon cancer    Orders:  •  Ambulatory Referral to Gastroenterology; Future  •  CBC; Future  •  Comprehensive metabolic panel; Future  •  Lipid Panel with Direct LDL reflex; Future  •  Vitamin B12; Future  •  Vitamin D 25 hydroxy; Future  •  Folate; Future  •  TSH, 3rd generation with Free T4 reflex; Future  •  PTH, intact; Future  •  Vitamin A; Future    Secondary hyperparathyroidism (HCC)  Patient follow with endocrinology, blood work is ordered  Orders:  •  CBC; Future  •  Comprehensive metabolic panel; Future  •  Lipid Panel with Direct LDL reflex; Future  •  Vitamin B12; Future  •  Vitamin D 25 hydroxy; Future  •  Folate; Future  •  TSH, 3rd generation with Free T4 reflex; Future  •  PTH, intact; Future  •  Vitamin A; Future    Recurrent major depressive disorder, in full remission (HCC)  In remission on fluoxetine 60 mg daily  Orders:  •  CBC; Future  •  Comprehensive metabolic panel; Future  •  Lipid Panel with Direct LDL reflex; Future  •  Vitamin B12; Future  •  Vitamin D 25 hydroxy; Future  •  Folate; Future  •  TSH, 3rd generation with Free T4 reflex; Future  •  PTH, intact; Future  •  Vitamin A;  Future    Leukopenia, unspecified type  Benign per hematology normal today we will continue to monitor  Orders:  •  CBC; Future  •  Comprehensive metabolic panel; Future  •  Lipid Panel with Direct LDL reflex; Future  •  Vitamin B12; Future  •  Vitamin D 25 hydroxy; Future  •  Folate; Future  •  TSH, 3rd generation with Free T4 reflex; Future  •  PTH, intact; Future  •  Vitamin A; Future    Low vitamin B12 level  Low, I recommend B12 1000 mcg 1 sublingual daily  Orders:  •  CBC; Future  •  Comprehensive metabolic panel; Future  •  Lipid Panel with Direct LDL reflex; Future  •  Vitamin B12; Future  •  Vitamin D 25 hydroxy; Future  •  Folate; Future  •  TSH, 3rd generation with Free T4 reflex; Future  •  PTH, intact; Future  •  Vitamin A; Future    Dyslipidemia  Diet controlled  Orders:  •  CBC; Future  •  Comprehensive metabolic panel; Future  •  Lipid Panel with Direct LDL reflex; Future  •  Vitamin B12; Future  •  Vitamin D 25 hydroxy; Future  •  Folate; Future  •  TSH, 3rd generation with Free T4 reflex; Future  •  PTH, intact; Future  •  Vitamin A; Future    Vitamin A deficiency  Normal continue to monitor  Orders:  •  CBC; Future  •  Comprehensive metabolic panel; Future  •  Lipid Panel with Direct LDL reflex; Future  •  Vitamin B12; Future  •  Vitamin D 25 hydroxy; Future  •  Folate; Future  •  TSH, 3rd generation with Free T4 reflex; Future  •  PTH, intact; Future  •  Vitamin A; Future    Vitamin D deficiency  Normal, continue to monitor  Orders:  •  CBC; Future  •  Comprehensive metabolic panel; Future  •  Lipid Panel with Direct LDL reflex; Future  •  Vitamin B12; Future  •  Vitamin D 25 hydroxy; Future  •  Folate; Future  •  TSH, 3rd generation with Free T4 reflex; Future  •  PTH, intact; Future  •  Vitamin A; Future    Postsurgical malabsorption  Continue to monitor blood work  Orders:  •  CBC; Future  •  Comprehensive metabolic panel; Future  •  Lipid Panel with Direct LDL reflex; Future  •  Vitamin B12;  Future  •  Vitamin D 25 hydroxy; Future  •  Folate; Future  •  TSH, 3rd generation with Free T4 reflex; Future  •  PTH, intact; Future  •  Vitamin A; Future    Bariatric surgery status  Continue to monitor blood work  Orders:  •  CBC; Future  •  Comprehensive metabolic panel; Future  •  Lipid Panel with Direct LDL reflex; Future  •  Vitamin B12; Future  •  Vitamin D 25 hydroxy; Future  •  Folate; Future  •  TSH, 3rd generation with Free T4 reflex; Future  •  PTH, intact; Future  •  Vitamin A; Future        Preventive Screenings:  - Diabetes Screening: screening up-to-date  - Cholesterol Screening: screening up-to-date   - Hepatitis C screening: screening up-to-date   - HIV screening: risks/benefits discussed and patient declines   - Cervical cancer screening: screening up-to-date   - Breast cancer screening: risks/benefits discussed and orders placed   - Colon cancer screening: risks/benefits discussed and orders placed   - Lung cancer screening: screening not indicated     Counseling/Anticipatory Guidance:    - Diet: discussed recommendations for a healthy/well-balanced diet.          History of Present Illness     Adult Annual Physical:  Patient presents for annual physical.     Diet and Physical Activity:  - Diet/Nutrition: well balanced diet and low carb diet.  - Exercise: walking, moderate cardiovascular exercise, 3-4 times a week on average and 30-60 minutes on average.    General Health:  - Sleep: sleeps well and 7-8 hours of sleep on average.  - Hearing: normal hearing bilateral ears.  - Vision: no vision problems, most recent eye exam > 1 year ago, wears glasses and previous LASIK surgery.  - Dental: regular dental visits, brushes teeth once daily and does not floss.    /GYN Health:  - Follows with GYN: yes.   - Menopause: perimenopausal.   - Last menstrual cycle: 6/6/2025.   - History of STDs: yes  - Contraception: tubal ligation.      Advanced Care Planning:  - Has an advanced directive?: yes    - Has a  durable medical POA?: no    - ACP document given to patient?: no      Review of Systems   Constitutional: Negative.    HENT: Negative.     Eyes: Negative.    Respiratory: Negative.     Cardiovascular: Negative.    Gastrointestinal: Negative.    Endocrine: Negative.    Genitourinary: Negative.    Musculoskeletal: Negative.    Skin: Negative.    Allergic/Immunologic: Negative.    Neurological: Negative.    Hematological: Negative.    Psychiatric/Behavioral: Negative.           Objective   /74 (BP Location: Left arm, Patient Position: Sitting, Cuff Size: Standard)   Pulse 71   Ht 5' (1.524 m)   Wt 63 kg (139 lb)   LMP 06/06/2025   SpO2 100%   BMI 27.15 kg/m²     Physical Exam  Vitals and nursing note reviewed.   Constitutional:       Appearance: Normal appearance.   HENT:      Head: Normocephalic and atraumatic.      Right Ear: Tympanic membrane normal.      Left Ear: Tympanic membrane normal.      Nose: Nose normal.      Mouth/Throat:      Mouth: Mucous membranes are moist.      Pharynx: Oropharynx is clear. No oropharyngeal exudate or posterior oropharyngeal erythema.     Eyes:      General: No scleral icterus.    Neck:      Vascular: No carotid bruit.     Cardiovascular:      Rate and Rhythm: Normal rate and regular rhythm.      Heart sounds: Normal heart sounds.   Pulmonary:      Effort: Pulmonary effort is normal.      Breath sounds: Normal breath sounds.   Abdominal:      Palpations: Abdomen is soft.      Tenderness: There is no abdominal tenderness.     Musculoskeletal:      Cervical back: Neck supple.      Right lower leg: No edema.      Left lower leg: No edema.     Skin:     General: Skin is warm and dry.     Neurological:      General: No focal deficit present.      Mental Status: She is alert and oriented to person, place, and time.      Cranial Nerves: No cranial nerve deficit.     Psychiatric:         Mood and Affect: Mood normal.         Behavior: Behavior normal.         Thought Content:  Thought content normal.         Judgment: Judgment normal.

## 2025-06-20 NOTE — ASSESSMENT & PLAN NOTE
Diet controlled  Orders:  •  CBC; Future  •  Comprehensive metabolic panel; Future  •  Lipid Panel with Direct LDL reflex; Future  •  Vitamin B12; Future  •  Vitamin D 25 hydroxy; Future  •  Folate; Future  •  TSH, 3rd generation with Free T4 reflex; Future  •  PTH, intact; Future  •  Vitamin A; Future

## 2025-06-20 NOTE — ASSESSMENT & PLAN NOTE
Continue to monitor blood work  Orders:  •  CBC; Future  •  Comprehensive metabolic panel; Future  •  Lipid Panel with Direct LDL reflex; Future  •  Vitamin B12; Future  •  Vitamin D 25 hydroxy; Future  •  Folate; Future  •  TSH, 3rd generation with Free T4 reflex; Future  •  PTH, intact; Future  •  Vitamin A; Future

## 2025-06-22 LAB — VIT A SERPL-MCNC: 45.7 UG/DL (ref 20.1–62)

## 2025-06-23 ENCOUNTER — RESULTS FOLLOW-UP (OUTPATIENT)
Dept: FAMILY MEDICINE CLINIC | Facility: CLINIC | Age: 45
End: 2025-06-23

## 2025-06-24 LAB
LAB AP GYN PRIMARY INTERPRETATION: NORMAL
Lab: NORMAL

## 2025-07-16 ENCOUNTER — OFFICE VISIT (OUTPATIENT)
Dept: ENDOCRINOLOGY | Facility: CLINIC | Age: 45
End: 2025-07-16
Payer: COMMERCIAL

## 2025-07-16 VITALS
HEART RATE: 82 BPM | SYSTOLIC BLOOD PRESSURE: 120 MMHG | WEIGHT: 141 LBS | HEIGHT: 60 IN | OXYGEN SATURATION: 98 % | BODY MASS INDEX: 27.68 KG/M2 | DIASTOLIC BLOOD PRESSURE: 80 MMHG

## 2025-07-16 DIAGNOSIS — E55.9 VITAMIN D DEFICIENCY: ICD-10-CM

## 2025-07-16 DIAGNOSIS — R82.994 HYPERCALCIURIA: ICD-10-CM

## 2025-07-16 DIAGNOSIS — N25.81 SECONDARY HYPERPARATHYROIDISM (HCC): Primary | ICD-10-CM

## 2025-07-16 DIAGNOSIS — K91.2 POSTSURGICAL MALABSORPTION: ICD-10-CM

## 2025-07-16 PROCEDURE — 99214 OFFICE O/P EST MOD 30 MIN: CPT | Performed by: INTERNAL MEDICINE

## 2025-07-16 NOTE — PATIENT INSTRUCTIONS
"Food  Calcium in milligrams    Milk (skim, 2%, or whole; 8 oz [240 mL]) 300   Yogurt (6 oz [168 g]) 250   Orange juice (with calcium; 8 oz [240 mL]) 300   Tofu with calcium (0.5 cup [113 g]) 435   Cheese (1 oz [28 g]) 195 to 335 (hard cheese = higher calcium)   Cottage cheese (0.5 cup [113 g]) 130   Ice cream or frozen yogurt (0.5 cup [113 g]) 100   Fortified non-dairy milks (soy, oat, almond; 8 oz [240 mL]) 300 to 450   Beans (0.5 cup cooked [113 g]) 60 to 80   Dark, leafy green vegetables (0.5 cup cooked [113 g]) 50 to 135   Almonds (24 whole) 70   Orange (1 medium) 60   Graphic 88664 Version 8.0  figure 1: Foods and drinks with calcium and vitamin D     Foods rich in calcium include ice cream, soy milk, breads, kale, broccoli, milk, cheese, cottage cheese, almonds, yogurt, ready-to-eat cereals, beans, and tofu. Foods rich in vitamin D include milk, fortified plant-based \"milks\" (soy, almond), canned tuna fish, cod liver oil, yogurt, ready-to-eat-cereals, cooked salmon, canned sardines, mackerel, and eggs. Some of these foods are rich in both.  Graphic 81413 Version 4.0  "

## 2025-07-16 NOTE — PROGRESS NOTES
Name: Tash Alonzo      : 1980      MRN: [id number]  Encounter Provider: Araceli Reyes MD  Encounter Date: 2025   Encounter department: [facility] [place]    Chief Complaint   Patient presents with   • Hyperparathyroidism   :  Assessment & Plan  Postsurgical malabsorption  Continue supplementations   Orders:  •  Comprehensive metabolic panel; Future    Secondary hyperparathyroidism (HCC)  PTH had normalized and is elevated again , she has decreased oral calcium intake -advised to increase dietary calcium - 1200 to 1500 mg daily and repeat labs in 3 months   Orders:  •  Comprehensive metabolic panel; Future  •  PTH, intact; Future  •  Phosphorus; Future  •  Magnesium; Future    Vitamin D deficiency  Continue vitamin D supplementations   Orders:  •  Comprehensive metabolic panel; Future  •  Vitamin D 25 hydroxy; Future    Hypercalciuria  Improved after stopping supplemental calcium                History of Present Illness     Tash Alonzo is a 44 y.o. female  with  history of secondary hyperparathyroidism following gastric bypass surgery.  Patient had gastric bypass surgery in      For vitamin D deficiency she is on Vitamin D3 6000 IU DAILY     Taking bariatric MVI - 2 tabs daily   Not as much dairy intake as before     No fractures , no history of kidney stones   Occasional myalgias and arthralgias     Menstrual cycles regular       Review of Systems as per HPI  Medications Ordered Prior to Encounter[1]   Social History[2]     Medical History Reviewed by provider this encounter:  Meds     .    Objective   /80   Pulse 82   Ht 5' (1.524 m)   Wt 64 kg (141 lb)   LMP 2025   SpO2 98%   BMI 27.54 kg/m²      Body mass index is 27.54 kg/m².  Wt Readings from Last 3 Encounters:   25 64 kg (141 lb)   25 63 kg (139 lb)   25 64.2 kg (141 lb 8 oz)     Physical Exam  Vitals reviewed.   Constitutional:       General: She is  not in acute distress.     Appearance: Normal appearance. She is not ill-appearing, toxic-appearing or diaphoretic.   HENT:      Head: Normocephalic and atraumatic.     Eyes:      General: No scleral icterus.     Extraocular Movements: Extraocular movements intact.       Cardiovascular:      Rate and Rhythm: Normal rate and regular rhythm.      Heart sounds: Normal heart sounds. No murmur heard.  Pulmonary:      Effort: Pulmonary effort is normal. No respiratory distress.      Breath sounds: Normal breath sounds. No wheezing or rales.     Musculoskeletal:      Cervical back: Neck supple.      Right lower leg: No edema.      Left lower leg: No edema.   Lymphadenopathy:      Cervical: No cervical adenopathy.     Skin:     General: Skin is warm and dry.     Neurological:      General: No focal deficit present.      Mental Status: She is alert and oriented to person, place, and time.     Psychiatric:         Mood and Affect: Mood normal.         Behavior: Behavior normal.         Thought Content: Thought content normal.         Judgment: Judgment normal.         Labs: I have reviewed pertinent labs including:   Cholesterol   Date Value Ref Range Status   08/11/2016 163 125 - 200 mg/dL Final     HDL   Date Value Ref Range Status   08/11/2016 41 (L) > OR = 46 mg/dL Final     HDL, Direct   Date Value Ref Range Status   06/18/2025 80 >=50 mg/dL Final     Comment:     HDL Cholesterol:       Low     <41 mg/dL     Triglycerides   Date Value Ref Range Status   06/18/2025 63 See Comment mg/dL Final     Comment:     Triglyceride:     0-9Y            <75mg/dL     10Y-17Y         <90 mg/dL       >=18Y     Normal          <150 mg/dL     Borderline High 150-199 mg/dL     High            200-499 mg/dL        Very High       >499 mg/dL    Specimen collection should occur prior to Metamizole administration due to the potential for falsely depressed results.   08/11/2016 90 <150 mg/dL Final      ALT   Date Value Ref Range Status  "  06/18/2025 25 7 - 52 U/L Final     Comment:     Specimen collection should occur prior to Sulfasalazine administration due to the potential for falsely depressed results.    02/20/2017 27 6 - 29 U/L Final     Comment:     Performed at: [facility]  ROSITA YOUNG MD, [address] [id number]       AST   Date Value Ref Range Status   06/18/2025 26 13 - 39 U/L Final   02/20/2017 17 10 - 30 U/L Final     Alkaline Phosphatase   Date Value Ref Range Status   06/18/2025 88 34 - 104 U/L Final   02/20/2017 71 33 - 115 U/L Final     Total Bilirubin   Date Value Ref Range Status   02/20/2017 0.6 0.2 - 1.2 mg/dL Final      Lab Results   Component Value Date    NJK5EZOOLKOR 2.135 06/18/2025      No results found for: \"FREET4\", \"T3FREE\", \"TSI\", \"ANTITPOAB\"   Lab Results   Component Value Date    [id number] 39.7 06/18/2025    [id number] 41.9 02/21/2025    [id number] 55.0 06/10/2024        Patient Instructions     Food  Calcium in milligrams    Milk (skim, 2%, or whole; 8 oz [240 mL]) 300   Yogurt (6 oz [168 g]) 250   Orange juice (with calcium; 8 oz [240 mL]) 300   Tofu with calcium (0.5 cup [113 g]) 435   Cheese (1 oz [28 g]) 195 to 335 (hard cheese = higher calcium)   Cottage cheese (0.5 cup [113 g]) 130   Ice cream or frozen yogurt (0.5 cup [113 g]) 100   Fortified non-dairy milks (soy, oat, almond; 8 oz [240 mL]) 300 to 450   Beans (0.5 cup cooked [113 g]) 60 to 80   Dark, leafy green vegetables (0.5 cup cooked [113 g]) 50 to 135   Almonds (24 whole) 70   Orange (1 medium) 60   Graphic 22600 Version 8.0  figure 1: Foods and drinks with calcium and vitamin D     Foods rich in calcium include ice cream, soy milk, breads, kale, broccoli, milk, cheese, cottage cheese, almonds, yogurt, ready-to-eat cereals, beans, and tofu. Foods rich in vitamin D include milk, fortified plant-based \"milks\" (soy, almond), canned tuna fish, cod liver oil, yogurt, ready-to-eat-cereals, cooked " salmon, canned sardines, mackerel, and eggs. Some of these foods are rich in both.  Graphic 91522 Version 4.0    Discussed with the patient and all questioned fully answered. She will call me if any problems arise.             [1]  Current Outpatient Medications on File Prior to Visit   Medication Sig Dispense Refill   • Ascorbic Acid (vitamin C) 1000 MG tablet Take 1,000 mg by mouth in the morning.     • cholecalciferol (VITAMIN D3) 1,000 units tablet Take 6,000 Units by mouth in the morning.     • Cyanocobalamin (B-12) 1000 MCG SUBL Place 1 tablet under the tongue in the morning     • fexofenadine (ALLEGRA) 180 MG tablet Take 180 mg by mouth in the morning.     • FLUoxetine 60 MG TABS Take 1 tablet (60 mg total) by mouth daily 90 tablet 3   • fluticasone (FLONASE) 50 mcg/act nasal spray 2 sprays into each nostril as needed     • IRON PO Take by mouth in the morning.     • Multiple Vitamins-Minerals (Bariatric Fusion) CHEW Takes 2--- which has 300mg calcium per tab (600mg calcium total) 60 tablet 1   • valACYclovir (VALTREX) 500 mg tablet TAKE 1 TABLET BY MOUTH EVERY DAY 90 tablet 0   • vitamin E, tocopherol, 400 units capsule Take 400 Units by mouth in the morning.       No current facility-administered medications on file prior to visit.   [2]  Social History  Tobacco Use   • Smoking status: Never     Passive exposure: Past   • Smokeless tobacco: Never   Vaping Use   • Vaping status: Never Used   Substance and Sexual Activity   • Alcohol use: Not Currently     Comment: social   • Drug use: No   • Sexual activity: Yes     Partners: Male     Birth control/protection: Female Sterilization

## 2025-07-16 NOTE — ASSESSMENT & PLAN NOTE
PTH had normalized and is elevated again , she has decreased oral calcium intake -advised to increase dietary calcium - 1200 to 1500 mg daily and repeat labs in 3 months   Orders:  •  Comprehensive metabolic panel; Future  •  PTH, intact; Future  •  Phosphorus; Future  •  Magnesium; Future

## 2025-07-16 NOTE — ASSESSMENT & PLAN NOTE
Continue vitamin D supplementations   Orders:  •  Comprehensive metabolic panel; Future  •  Vitamin D 25 hydroxy; Future

## 2025-08-04 ENCOUNTER — HOSPITAL ENCOUNTER (OUTPATIENT)
Dept: MAMMOGRAPHY | Facility: MEDICAL CENTER | Age: 45
Discharge: HOME | End: 2025-08-04
Payer: COMMERCIAL

## 2025-08-04 VITALS — HEIGHT: 60 IN | BODY MASS INDEX: 27.68 KG/M2 | WEIGHT: 141 LBS

## 2025-08-04 DIAGNOSIS — Z12.31 ENCOUNTER FOR SCREENING MAMMOGRAM FOR BREAST CANCER: ICD-10-CM

## 2025-08-04 PROCEDURE — 77063 BREAST TOMOSYNTHESIS BI: CPT

## 2025-08-04 PROCEDURE — 77067 SCR MAMMO BI INCL CAD: CPT

## 2025-08-07 ENCOUNTER — VBI (OUTPATIENT)
Dept: ADMINISTRATIVE | Facility: OTHER | Age: 45
End: 2025-08-07

## (undated) DEVICE — DRAPE EQUIPMENT RF WAND

## (undated) DEVICE — SUT MONOCRYL 4-0 PS-2 27 IN Y426H

## (undated) DEVICE — AEM CORD

## (undated) DEVICE — ADHESIVE SKN CLSR HISTOACRYL FLEX 0.5ML LF

## (undated) DEVICE — Device: Brand: DEFENDO AIR/WATER/SUCTION AND BIOPSY VALVE

## (undated) DEVICE — ENDOPATH PNEUMONEEDLE INSUFFLATION NEEDLES WITH LUER LOCK CONNECTORS 120MM: Brand: ENDOPATH

## (undated) DEVICE — PREMIUM DRY TRAY LF: Brand: MEDLINE INDUSTRIES, INC.

## (undated) DEVICE — STAPLER EEA 25 MM COVIDIEN

## (undated) DEVICE — ENSEAL LAPAROSCOPIC TISSUE SEALER G2 STRAIGHT JAW FOR USE WITH G2 GENERATOR 5MM DIAMETER 35CM SHAFT LENGTH: Brand: ENSEAL

## (undated) DEVICE — SYRINGE 30ML LL

## (undated) DEVICE — WEBRIL 6 IN UNSTERILE

## (undated) DEVICE — STAPLER ENDO GIA ROTICULATOR 60-2.5

## (undated) DEVICE — INTENDED FOR TISSUE SEPARATION, AND OTHER PROCEDURES THAT REQUIRE A SHARP SURGICAL BLADE TO PUNCTURE OR CUT.: Brand: BARD-PARKER SAFETY BLADES SIZE 15, STERILE

## (undated) DEVICE — SUT ETHIBOND 0 CT-1 30 IN X424H

## (undated) DEVICE — ENDOPOUCH RETRIEVER SPECIMEN RETRIEVAL BAGS: Brand: ENDOPOUCH RETRIEVER

## (undated) DEVICE — TROCAR: Brand: KII FIOS FIRST ENTRY

## (undated) DEVICE — 3M™ STERI-DRAPE™ UNDER BUTTOCKS DRAPE WITH POUCH 1084: Brand: STERI-DRAPE™

## (undated) DEVICE — ENDOPATH XCEL BLADELESS TROCARS WITH STABILITY SLEEVES: Brand: ENDOPATH XCEL

## (undated) DEVICE — GLOVE SRG BIOGEL 7.5

## (undated) DEVICE — GLOVE PI ULTRA TOUCH SZ 6

## (undated) DEVICE — URETERAL CATHETER ADAPTOR TIP

## (undated) DEVICE — PMI DISPOSABLE PUNCTURE CLOSURE DEVICE / SUTURE GRASPER: Brand: PMI

## (undated) DEVICE — COVIDIEN ENDO GIA PURPLE (MED) RELOAD 60MM

## (undated) DEVICE — NEEDLE SPINAL 22G X 3.5IN  QUINCKE

## (undated) DEVICE — STRL UNIVERSAL MINOR GENERAL: Brand: CARDINAL HEALTH

## (undated) DEVICE — IV EXTENSION TUBING 33 IN

## (undated) DEVICE — STAPLER GIA HANDLE STAND 4MM

## (undated) DEVICE — HARMONIC ACE +7 LAPAROSCOPIC SHEARS ADVANCED HEMOSTASIS 5MM DIAMETER 36CM SHAFT LENGTH  FOR USE WITH GRAY HAND PIECE ONLY: Brand: HARMONIC ACE

## (undated) DEVICE — PVC URETHRAL CATHETER: Brand: DOVER

## (undated) DEVICE — CYSTO TUBING SINGLE IRRIGATION

## (undated) DEVICE — TRAVELKIT CONTAINS FIRST STEP KIT (200ML EP-4 KIT) AND SOILED SCOPE BAG - 1 KIT: Brand: TRAVELKIT CONTAINS FIRST STEP KIT AND SOILED SCOPE BAG

## (undated) DEVICE — SCD SEQUENTIAL COMPRESSION COMFORT SLEEVE MEDIUM KNEE LENGTH: Brand: KENDALL SCD

## (undated) DEVICE — ADAPTOR CATH

## (undated) DEVICE — IRRIG ENDO FLO TUBING

## (undated) DEVICE — TUBING SMOKE EVAC W/FILTRATION DEVICE PLUMEPORT ACTIV

## (undated) DEVICE — TISSUE RETRIEVAL SYSTEM: Brand: INZII RETRIEVAL SYSTEM

## (undated) DEVICE — ENDOPATH XCEL UNIVERSAL TROCAR STABLILITY SLEEVES: Brand: ENDOPATH XCEL

## (undated) DEVICE — REM POLYHESIVE ADULT PATIENT RETURN ELECTRODE: Brand: VALLEYLAB

## (undated) DEVICE — CHLORAPREP HI-LITE 26ML ORANGE

## (undated) DEVICE — VIOLET BRAIDED (POLYGLACTIN 910), SYNTHETIC ABSORBABLE SUTURE: Brand: COATED VICRYL

## (undated) DEVICE — 3M™ DURAPORE™ SURGICAL TAPE 1538-3, 3 INCH X 10 YARD (7,5CM X 9,1M), 4 ROLLS/BOX: Brand: 3M™ DURAPORE™

## (undated) DEVICE — SEAMGUARD STPL REINF ENDO GIA ULTRA UNIV 60 PURPLE

## (undated) DEVICE — 3000CC GUARDIAN II: Brand: GUARDIAN

## (undated) DEVICE — TUBING SUCTION 5MM X 12 FT

## (undated) DEVICE — ANTI-FOG SOLUTION WITH FOAM PAD: Brand: DEVON

## (undated) DEVICE — [HIGH FLOW INSUFFLATOR,  DO NOT USE IF PACKAGE IS DAMAGED,  KEEP DRY,  KEEP AWAY FROM SUNLIGHT,  PROTECT FROM HEAT AND RADIOACTIVE SOURCES.]: Brand: PNEUMOSURE

## (undated) DEVICE — TIBURON LAPAROSCOPIC ABDOMINAL DRAPE: Brand: CONVERTORS

## (undated) DEVICE — INTENDED FOR TISSUE SEPARATION, AND OTHER PROCEDURES THAT REQUIRE A SHARP SURGICAL BLADE TO PUNCTURE OR CUT.: Brand: BARD-PARKER SAFETY BLADES SIZE 11, STERILE

## (undated) DEVICE — GLOVE SRG BIOGEL 8

## (undated) DEVICE — INTRODUCER ANAL ABDOM EEA25 DISP STRL

## (undated) DEVICE — TELFA NON-ADHERENT ABSORBENT DRESSING: Brand: TELFA

## (undated) DEVICE — BETHLEHEM UNIVERSAL GYN LAP PK: Brand: CARDINAL HEALTH

## (undated) DEVICE — SURGICAL GOWN, XL SMARTSLEEVE: Brand: CONVERTORS

## (undated) DEVICE — BLUE HEAT SCOPE WARMER

## (undated) DEVICE — GAUZE SPONGES,USP TYPE VII GAUZE, 12 PLY: Brand: CURITY

## (undated) DEVICE — DRAPE TOWEL: Brand: CONVERTORS

## (undated) DEVICE — NEEDLE 25G X 1 1/2

## (undated) DEVICE — GAUZE SPONGES,16 PLY: Brand: CURITY

## (undated) DEVICE — 10 MM BABCOCKS WITH RATCHET HANDLES: Brand: ENDOPATH

## (undated) DEVICE — ABSORBABLE WOUND CLOSURE DEVICE: Brand: V-LOC 90

## (undated) DEVICE — LAP AEM SCISSOR TIP .75 IN

## (undated) DEVICE — LIGACLIP 10-M/L, 10MM ENDOSCOPIC ROTATING MULTIPLE CLIP APPLIERS: Brand: LIGACLIP

## (undated) DEVICE — Device

## (undated) DEVICE — 2000CC GUARDIAN II: Brand: GUARDIAN

## (undated) DEVICE — GLOVE INDICATOR UNDERGLOVE SZ 6 BLUE

## (undated) DEVICE — SINGLE-USE BIOPSY FORCEPS: Brand: RADIAL JAW 4